# Patient Record
Sex: FEMALE | Race: WHITE | NOT HISPANIC OR LATINO | Employment: UNEMPLOYED | URBAN - NONMETROPOLITAN AREA
[De-identification: names, ages, dates, MRNs, and addresses within clinical notes are randomized per-mention and may not be internally consistent; named-entity substitution may affect disease eponyms.]

---

## 2019-02-23 ENCOUNTER — HOSPITAL ENCOUNTER (EMERGENCY)
Facility: HOSPITAL | Age: 34
Discharge: HOME | End: 2019-02-23
Attending: EMERGENCY MEDICINE | Admitting: EMERGENCY MEDICINE

## 2019-02-23 ENCOUNTER — NURSE TRIAGE (OUTPATIENT)
Dept: CALL CENTER | Facility: HOSPITAL | Age: 34
End: 2019-02-23

## 2019-02-23 VITALS
SYSTOLIC BLOOD PRESSURE: 135 MMHG | HEIGHT: 62 IN | OXYGEN SATURATION: 99 % | RESPIRATION RATE: 17 BRPM | DIASTOLIC BLOOD PRESSURE: 77 MMHG | WEIGHT: 149.4 LBS | HEART RATE: 98 BPM | BODY MASS INDEX: 27.49 KG/M2 | TEMPERATURE: 98 F
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
## 2019-02-23 DIAGNOSIS — T30.0 FIRST DEGREE BURN: Primary | ICD-10-CM

## 2019-02-23 DIAGNOSIS — L56.8 SUNBURN DUE TO TANNING BED: ICD-10-CM

## 2019-02-23 PROCEDURE — 25010000002 KETOROLAC TROMETHAMINE PER 15 MG: Performed by: EMERGENCY MEDICINE

## 2019-02-23 PROCEDURE — 25010000002 METHYLPREDNISOLONE PER 40 MG: Performed by: EMERGENCY MEDICINE

## 2019-02-23 PROCEDURE — 96372 THER/PROPH/DIAG INJ SC/IM: CPT

## 2019-02-23 PROCEDURE — 99283 EMERGENCY DEPT VISIT LOW MDM: CPT

## 2019-02-23 RX ORDER — NAPROXEN 500 MG/1
500 TABLET ORAL 2 TIMES DAILY PRN
Qty: 20 TABLET | Refills: 0 | OUTPATIENT
Start: 2019-02-23 | End: 2019-03-03

## 2019-02-23 RX ORDER — AMLODIPINE BESYLATE 10 MG/1
10 TABLET ORAL DAILY
Status: ON HOLD | COMMUNITY
End: 2020-06-21

## 2019-02-23 RX ORDER — ACETAMINOPHEN AND CODEINE PHOSPHATE 300; 30 MG/1; MG/1
1 TABLET ORAL EVERY 6 HOURS PRN
Qty: 12 TABLET | Refills: 0 | OUTPATIENT
Start: 2019-02-23 | End: 2019-03-03

## 2019-02-23 RX ORDER — KETOROLAC TROMETHAMINE 30 MG/ML
60 INJECTION, SOLUTION INTRAMUSCULAR; INTRAVENOUS ONCE
Status: COMPLETED | OUTPATIENT
Start: 2019-02-23 | End: 2019-02-23

## 2019-02-23 RX ORDER — METHYLPREDNISOLONE SODIUM SUCCINATE 40 MG/ML
80 INJECTION, POWDER, LYOPHILIZED, FOR SOLUTION INTRAMUSCULAR; INTRAVENOUS ONCE
Status: COMPLETED | OUTPATIENT
Start: 2019-02-23 | End: 2019-02-23

## 2019-02-23 RX ORDER — LISINOPRIL 20 MG/1
20 TABLET ORAL DAILY
Status: ON HOLD | COMMUNITY
End: 2020-06-21

## 2019-02-23 RX ADMIN — KETOROLAC TROMETHAMINE 60 MG: 60 INJECTION, SOLUTION INTRAMUSCULAR at 17:18

## 2019-02-23 RX ADMIN — METHYLPREDNISOLONE SODIUM SUCCINATE 80 MG: 40 INJECTION, POWDER, FOR SOLUTION INTRAMUSCULAR; INTRAVENOUS at 17:21

## 2019-02-23 NOTE — ED PROVIDER NOTES
Subjective   33-year-old white female here for burn.  Patient states that she lie in the tanning bed for 30 minutes yesterday after not being in the sun for a couple of years.  She complains of severe burn with redness and pain all over her body.  She denies any other complaints.  Her mother put Solarcaine gel on her without relief.  Patient states she feels like her skin is to continue her clothes.  Mother says she did not notice any areas of blistering.            Review of Systems   All other systems reviewed and are negative.      Past Medical History:   Diagnosis Date   • Kidney anomaly, congenital    • Kidney stone        No Known Allergies    Past Surgical History:   Procedure Laterality Date   •  SECTION         History reviewed. No pertinent family history.    Social History     Socioeconomic History   • Marital status:      Spouse name: Not on file   • Number of children: Not on file   • Years of education: Not on file   • Highest education level: Not on file   Tobacco Use   • Smoking status: Current Every Day Smoker     Packs/day: 0.50   Substance and Sexual Activity   • Alcohol use: No   • Drug use: No   • Sexual activity: Defer           Objective   Physical Exam   Constitutional: She is oriented to person, place, and time. She appears well-developed and well-nourished.   Appears in pain, crying, and screaming with any motion or palpation.   HENT:   Head: Normocephalic and atraumatic.   Musculoskeletal: Normal range of motion.   Neurological: She is alert and oriented to person, place, and time.   Skin:   Diffuse erythema from the neck to feet.  This is more pronounced on the lower back, abdomen and upper legs.  Several areas of normal color skin in skin folds or areas which were covered such as axillae, sacral, gluteal labs, etc.  There is no evidence of vesicles or bullae.  No third degree burns noted.  Do not appreciate any weeping at this time.   Psychiatric: She has a normal mood and  affect.   Nursing note and vitals reviewed.      Procedures           ED Course                  MDM  Number of Diagnoses or Management Options  First degree burn:   Sunburn due to tanning bed:   Risk of Complications, Morbidity, and/or Mortality  Presenting problems: moderate  Diagnostic procedures: minimal  Management options: low          Final diagnoses:   First degree burn   Sunburn due to tanning bed            Ankit Kahn MD  02/23/19 1906

## 2019-02-23 NOTE — TELEPHONE ENCOUNTER
"    Reason for Disposition  • [1] SEVERE sunburn pain (e.g., excruciating) AND [2] not improved after 2  hours of pain medicine    Additional Information  • Negative: [1] Difficulty breathing AND [2] exposure to fire, smoke, or fumes  • Negative: Shock suspected (e.g., cold/pale/clammy skin, too weak to stand, low BP, rapid pulse)  • Negative: Difficult to awaken or acting confused (e.g., disoriented, slurred speech)  • Negative: [1] Burn area larger than 10 palms of hand (> 10% BSA) AND [2] blisters  • Negative: Sounds like a life-threatening emergency to the triager  • Negative: Smoke inhalation is main concern  • Sunburn  • Negative: Difficult to awaken or acting confused (e.g., disoriented, slurred speech)  • Negative: Passed out (i.e., lost consciousness, collapsed and was not responding)  • Negative: Sounds like a life-threatening emergency to the triager  • Negative: [1] Sunburn - like rash BUT [2] minimal or no sun exposure  • Negative: Heat stroke, sunstroke or heat exhaustion suspected  • Negative: Too weak to stand  • Negative: Fever > 103 F (39.4 C)  • Negative: [1] Blisters (2nd degree burn) AND [2] covers > 10% of body surface area  • Negative: Patient sounds very sick or weak to the triager    Answer Assessment - Initial Assessment Questions  1. ONSET: \"When did it happen?\" If happened < 10 minutes ago, ask: \"Did you apply cold water?\" If not, give First Aid Advice immediately.       Caller fell asleep in a tanning bed.  Happened yesterday.   2. LOCATION: \"Where is the burn located?\"       She states her entire body including her face is sunburned.   3. BURN SIZE: \"How large is the burn?\"  The palm is roughly 1% of the total body surface area (BSA).      Entire body.   4. SEVERITY OF THE BURN: \"Are there any blisters?\"       She denies blisters but states entire body is very, very red and she is in pain.   5. MECHANISM: \"Tell me how it happened.\"      She fell asleep in a tanning bed.  Was in the " "tanning bed 30 minutes.   6. PAIN: \"Are you having any pain?\" \"How bad is the pain?\" (Scale 1-10; or mild, moderate, severe)    - MILD (1-3): doesn't interfere with normal activities     - MODERATE (4-7): interferes with normal activities or awakens from sleep     - SEVERE (8-10): excruciating pain, unable to do any normal activities       Severe pain.   7. INHALATION INJURY: \"Were you exposed to any smoke or fumes?\" If yes: \"Do you have any cough or difficulty breathing?\"      NO inhalation.   8. OTHER SYMPTOMS: \"Do you have any other symptoms?\" (e.g., headache, nausea)      No headache or nausea.   9. PREGNANCY: \"Is there any chance you are pregnant?\" \"When was your last menstrual period?\"      No.    Answer Assessment - Initial Assessment Questions  1. APPEARANCE of SKIN: \"What does it look like?\" \"Where is the sunburn located?\"       Skin is very red over entire body.   2. BLISTERS: \"Are there any blisters?\" If so, ask: \"Where are they located?\" \"How big are they?\" \"Are they closed or broken?\"       She states she does not see any blisters but cannot see entire body.   3. EXTENT: \"How much of the body has a sunburn?\" \"How large is the area of blistering?\" (can compare to the size of their palm)      Entire body including the face.   4. ONSET: \"When did the sun exposure occur?\" (Hours or days ago)       Yesterday.   5. PAIN: \"How painful is the sunburn?\"  (Scale 1-10; mild, moderate or severe)      Severe.   6. OTHER SYMPTOMS: \"Do you have any other symptoms?\" (e.g., lightheadedness, nausea)      No other symptoms.   7. PREGNANCY: \"Is there any chance you are pregnant?\" \"When was your last menstrual period?\"      No.    Protocols used: SUNBURN-ADULT-, BURNS - THERMAL-ADULT-      "

## 2019-03-03 ENCOUNTER — HOSPITAL ENCOUNTER (EMERGENCY)
Facility: HOSPITAL | Age: 34
Discharge: HOME | End: 2019-03-03
Attending: FAMILY MEDICINE | Admitting: FAMILY MEDICINE

## 2019-03-03 VITALS
WEIGHT: 148 LBS | BODY MASS INDEX: 26.22 KG/M2 | HEIGHT: 63 IN | TEMPERATURE: 98.2 F | OXYGEN SATURATION: 98 % | DIASTOLIC BLOOD PRESSURE: 89 MMHG | HEART RATE: 74 BPM | SYSTOLIC BLOOD PRESSURE: 137 MMHG | RESPIRATION RATE: 20 BRPM

## 2019-03-03 DIAGNOSIS — F19.10 SUBSTANCE ABUSE (HCC): Primary | ICD-10-CM

## 2019-03-03 LAB
6-ACETYL MORPHINE: NEGATIVE
ALBUMIN SERPL-MCNC: 4.3 G/DL (ref 3.5–5)
ALBUMIN/GLOB SERPL: 1.3 G/DL (ref 1.5–2.5)
ALP SERPL-CCNC: 69 U/L (ref 35–104)
ALT SERPL W P-5'-P-CCNC: 18 U/L (ref 10–36)
AMPHET+METHAMPHET UR QL: POSITIVE
ANION GAP SERPL CALCULATED.3IONS-SCNC: 6 MMOL/L (ref 3.6–11.2)
AST SERPL-CCNC: 24 U/L (ref 10–30)
B-HCG UR QL: NEGATIVE
BACTERIA UR QL AUTO: ABNORMAL /HPF
BARBITURATES UR QL SCN: NEGATIVE
BASOPHILS # BLD AUTO: 0.04 10*3/MM3 (ref 0–0.3)
BASOPHILS NFR BLD AUTO: 0.6 % (ref 0–2)
BENZODIAZ UR QL SCN: NEGATIVE
BILIRUB SERPL-MCNC: 0.3 MG/DL (ref 0.2–1.8)
BILIRUB UR QL STRIP: NEGATIVE
BUN BLD-MCNC: 16 MG/DL (ref 7–21)
BUN/CREAT SERPL: 22.5 (ref 7–25)
BUPRENORPHINE SERPL-MCNC: POSITIVE NG/ML
CALCIUM SPEC-SCNC: 9.2 MG/DL (ref 7.7–10)
CANNABINOIDS SERPL QL: POSITIVE
CHLORIDE SERPL-SCNC: 108 MMOL/L (ref 99–112)
CLARITY UR: CLEAR
CO2 SERPL-SCNC: 24 MMOL/L (ref 24.3–31.9)
COCAINE UR QL: NEGATIVE
COLOR UR: ABNORMAL
CREAT BLD-MCNC: 0.71 MG/DL (ref 0.43–1.29)
DEPRECATED RDW RBC AUTO: 43.1 FL (ref 37–54)
EOSINOPHIL # BLD AUTO: 0.23 10*3/MM3 (ref 0–0.7)
EOSINOPHIL NFR BLD AUTO: 3.2 % (ref 0–5)
ERYTHROCYTE [DISTWIDTH] IN BLOOD BY AUTOMATED COUNT: 13.7 % (ref 11.5–14.5)
ETHANOL BLD-MCNC: <10 MG/DL
ETHANOL UR QL: <0.01 %
GFR SERPL CREATININE-BSD FRML MDRD: 95 ML/MIN/1.73
GLOBULIN UR ELPH-MCNC: 3.4 GM/DL
GLUCOSE BLD-MCNC: 77 MG/DL (ref 70–110)
GLUCOSE UR STRIP-MCNC: NEGATIVE MG/DL
HCT VFR BLD AUTO: 37.1 % (ref 37–47)
HGB BLD-MCNC: 12.2 G/DL (ref 12–16)
HGB UR QL STRIP.AUTO: ABNORMAL
HYALINE CASTS UR QL AUTO: ABNORMAL /LPF
IMM GRANULOCYTES # BLD AUTO: 0.02 10*3/MM3 (ref 0–0.03)
IMM GRANULOCYTES NFR BLD AUTO: 0.3 % (ref 0–0.5)
KETONES UR QL STRIP: NEGATIVE
LEUKOCYTE ESTERASE UR QL STRIP.AUTO: NEGATIVE
LYMPHOCYTES # BLD AUTO: 2.22 10*3/MM3 (ref 1–3)
LYMPHOCYTES NFR BLD AUTO: 31.3 % (ref 21–51)
MAGNESIUM SERPL-MCNC: 2.2 MG/DL (ref 1.7–2.6)
MCH RBC QN AUTO: 28.7 PG (ref 27–33)
MCHC RBC AUTO-ENTMCNC: 32.9 G/DL (ref 33–37)
MCV RBC AUTO: 87.3 FL (ref 80–94)
METHADONE UR QL SCN: NEGATIVE
MONOCYTES # BLD AUTO: 0.65 10*3/MM3 (ref 0.1–0.9)
MONOCYTES NFR BLD AUTO: 9.2 % (ref 0–10)
NEUTROPHILS # BLD AUTO: 3.94 10*3/MM3 (ref 1.4–6.5)
NEUTROPHILS NFR BLD AUTO: 55.4 % (ref 30–70)
NITRITE UR QL STRIP: POSITIVE
OPIATES UR QL: NEGATIVE
OSMOLALITY SERPL CALC.SUM OF ELEC: 275.7 MOSM/KG (ref 273–305)
OXYCODONE UR QL SCN: NEGATIVE
PCP UR QL SCN: NEGATIVE
PH UR STRIP.AUTO: 5.5 [PH] (ref 5–8)
PLATELET # BLD AUTO: 333 10*3/MM3 (ref 130–400)
PMV BLD AUTO: 10.5 FL (ref 6–10)
POTASSIUM BLD-SCNC: 3.6 MMOL/L (ref 3.5–5.3)
PROT SERPL-MCNC: 7.7 G/DL (ref 6–8)
PROT UR QL STRIP: NEGATIVE
RBC # BLD AUTO: 4.25 10*6/MM3 (ref 4.2–5.4)
RBC # UR: ABNORMAL /HPF
REF LAB TEST METHOD: ABNORMAL
SODIUM BLD-SCNC: 138 MMOL/L (ref 135–153)
SP GR UR STRIP: >=1.03 (ref 1–1.03)
SQUAMOUS #/AREA URNS HPF: ABNORMAL /HPF
UROBILINOGEN UR QL STRIP: ABNORMAL
WBC NRBC COR # BLD: 7.1 10*3/MM3 (ref 4.5–12.5)
WBC UR QL AUTO: ABNORMAL /HPF

## 2019-03-03 PROCEDURE — 80307 DRUG TEST PRSMV CHEM ANLYZR: CPT | Performed by: PHYSICIAN ASSISTANT

## 2019-03-03 PROCEDURE — 83735 ASSAY OF MAGNESIUM: CPT | Performed by: PHYSICIAN ASSISTANT

## 2019-03-03 PROCEDURE — 81025 URINE PREGNANCY TEST: CPT | Performed by: PHYSICIAN ASSISTANT

## 2019-03-03 PROCEDURE — 99284 EMERGENCY DEPT VISIT MOD MDM: CPT

## 2019-03-03 PROCEDURE — 85025 COMPLETE CBC W/AUTO DIFF WBC: CPT | Performed by: PHYSICIAN ASSISTANT

## 2019-03-03 PROCEDURE — 81001 URINALYSIS AUTO W/SCOPE: CPT | Performed by: PHYSICIAN ASSISTANT

## 2019-03-03 PROCEDURE — 80053 COMPREHEN METABOLIC PANEL: CPT | Performed by: PHYSICIAN ASSISTANT

## 2019-03-03 NOTE — NURSING NOTE
Presented clinicals to DR Lerma. Pt does not meet admission criteria at this, recommended that pt continue with plans of entering rehab and return if symptoms worsen. Pt verbalized understanding of dc instructions. Detox packet given

## 2019-03-03 NOTE — ED PROVIDER NOTES
Subjective   33-year-old female presents the ED today for a detox evaluation.  She states she plans to go to the [facility] for drug rehab after detox.  She states she also wants to be evaluated to get back on her psychiatric medicines.  She states she is most to be on Paxil and some other medications but had to stop them when she lost her medical card.  She states she has been using Suboxone, opiates, methamphetamine, Neurontin and Lyrica.  She last used yesterday.  She denies any suicidal ideations.  She denies any alcohol use.        History provided by:  Patient  Drug / Alcohol Assessment   Primary symptoms comment: requesting detox. This is a new problem. The current episode started 12 to 24 hours ago. The problem has been gradually worsening. Suspected agents include opiates, prescription drugs and methamphetamines. Pertinent negatives include no fever, no injury, no nausea, no vomiting, no bladder incontinence and no bowel incontinence. Associated medical issues include addiction treatment and mental illness.       Review of Systems   Constitutional: Negative for fever.   HENT: Negative.    Eyes: Negative.    Respiratory: Negative.    Cardiovascular: Negative.    Gastrointestinal: Negative for bowel incontinence, nausea and vomiting.   Genitourinary: Negative.  Negative for bladder incontinence.   Musculoskeletal: Negative.    Skin: Negative.    Neurological: Negative.    Psychiatric/Behavioral: Negative for suicidal ideas.   All other systems reviewed and are negative.      Past Medical History:   Diagnosis Date   • Kidney anomaly, congenital    • Kidney stone        No Known Allergies    Past Surgical History:   Procedure Laterality Date   •  SECTION         History reviewed. No pertinent family history.    Social History     Socioeconomic History   • Marital status:      Spouse name: Not on file   • Number of children: Not on file   • Years of education: Not on file   • Highest education  level: Not on file   Tobacco Use   • Smoking status: Current Every Day Smoker     Packs/day: 1.00   Substance and Sexual Activity   • Alcohol use: No   • Drug use: Yes     Types: Methamphetamines, Oxycodone     Comment: suboxone, neurontin   • Sexual activity: Defer           Objective   Physical Exam   Constitutional: She is oriented to person, place, and time. She appears well-developed and well-nourished. No distress.   HENT:   Head: Normocephalic and atraumatic.   Right Ear: External ear normal.   Left Ear: External ear normal.   Nose: Nose normal.   Mouth/Throat: Oropharynx is clear and moist.   Eyes: Conjunctivae and EOM are normal. Pupils are equal, round, and reactive to light.   Neck: Normal range of motion. Neck supple.   Cardiovascular: Normal rate, regular rhythm, normal heart sounds and intact distal pulses.   Pulmonary/Chest: Effort normal and breath sounds normal.   Abdominal: Soft. Bowel sounds are normal.   Musculoskeletal: Normal range of motion.   Neurological: She is alert and oriented to person, place, and time.   Skin: Skin is warm and dry. Capillary refill takes less than 2 seconds.   Psychiatric: She has a normal mood and affect. Her speech is normal. Judgment normal. She is hyperactive. Cognition and memory are normal. She expresses no homicidal and no suicidal ideation.   Nursing note and vitals reviewed.      Procedures           ED Course  ED Course as of Mar 03 1817   Sun Mar 03, 2019   1702 Medically clear for psych  [AH]   1715 Psychiatry advised patient can be discharged to follow up outpatient.  []      ED Course User Index  [] Mary Mejía PA                  MDM  Number of Diagnoses or Management Options  Substance abuse (CMS/HCC):      Amount and/or Complexity of Data Reviewed  Clinical lab tests: reviewed    Patient Progress  Patient progress: stable        Final diagnoses:   Substance abuse (CMS/HCC)            Mary Mejía PA  03/03/19 1817

## 2019-03-03 NOTE — NURSING NOTE
Intake assessment completed. Pt presents seeking detox admission. Pt reports that she relapsed after a 1 year sobriety 2 weeks ago following the death of a family member. The pt reports occasional use of several substances during the past two weeks. Reports that she has used meth and pain pills a few times, and that she has used suboxone daily over the last 5 days. She denies si, hi, and AVH. Reports last use two days ago.

## 2020-02-01 ENCOUNTER — HOSPITAL ENCOUNTER (EMERGENCY)
Facility: HOSPITAL | Age: 35
Discharge: HOME | End: 2020-02-01
Attending: EMERGENCY MEDICINE | Admitting: EMERGENCY MEDICINE

## 2020-02-01 VITALS
BODY MASS INDEX: 26.05 KG/M2 | SYSTOLIC BLOOD PRESSURE: 131 MMHG | WEIGHT: 147 LBS | TEMPERATURE: 98.1 F | DIASTOLIC BLOOD PRESSURE: 94 MMHG | OXYGEN SATURATION: 99 % | RESPIRATION RATE: 20 BRPM | HEART RATE: 90 BPM | HEIGHT: 63 IN

## 2020-02-01 DIAGNOSIS — N39.0 ACUTE UTI: Primary | ICD-10-CM

## 2020-02-01 LAB
6-ACETYL MORPHINE: NEGATIVE
ALBUMIN SERPL-MCNC: 3.5 G/DL (ref 3.5–5.2)
ALBUMIN/GLOB SERPL: 1.1 G/DL
ALP SERPL-CCNC: 59 U/L (ref 39–117)
ALT SERPL W P-5'-P-CCNC: 12 U/L (ref 1–33)
AMPHET+METHAMPHET UR QL: NEGATIVE
ANION GAP SERPL CALCULATED.3IONS-SCNC: 13.7 MMOL/L (ref 5–15)
AST SERPL-CCNC: 14 U/L (ref 1–32)
BACTERIA UR QL AUTO: ABNORMAL /HPF
BARBITURATES UR QL SCN: NEGATIVE
BASOPHILS # BLD AUTO: 0.04 10*3/MM3 (ref 0–0.2)
BASOPHILS NFR BLD AUTO: 0.2 % (ref 0–1.5)
BENZODIAZ UR QL SCN: NEGATIVE
BILIRUB SERPL-MCNC: <0.2 MG/DL (ref 0.2–1.2)
BILIRUB UR QL STRIP: NEGATIVE
BUN BLD-MCNC: 14 MG/DL (ref 6–20)
BUN/CREAT SERPL: 18.7 (ref 7–25)
BUPRENORPHINE SERPL-MCNC: NEGATIVE NG/ML
CALCIUM SPEC-SCNC: 9 MG/DL (ref 8.6–10.5)
CANNABINOIDS SERPL QL: NEGATIVE
CHLORIDE SERPL-SCNC: 103 MMOL/L (ref 98–107)
CLARITY UR: ABNORMAL
CO2 SERPL-SCNC: 22.3 MMOL/L (ref 22–29)
COCAINE UR QL: NEGATIVE
COLOR UR: YELLOW
CREAT BLD-MCNC: 0.75 MG/DL (ref 0.57–1)
DEPRECATED RDW RBC AUTO: 45.2 FL (ref 37–54)
EOSINOPHIL # BLD AUTO: 0.26 10*3/MM3 (ref 0–0.4)
EOSINOPHIL NFR BLD AUTO: 1.3 % (ref 0.3–6.2)
ERYTHROCYTE [DISTWIDTH] IN BLOOD BY AUTOMATED COUNT: 13.9 % (ref 12.3–15.4)
GFR SERPL CREATININE-BSD FRML MDRD: 89 ML/MIN/1.73
GLOBULIN UR ELPH-MCNC: 3.3 GM/DL
GLUCOSE BLD-MCNC: 68 MG/DL (ref 65–99)
GLUCOSE UR STRIP-MCNC: NEGATIVE MG/DL
HCT VFR BLD AUTO: 38.8 % (ref 34–46.6)
HGB BLD-MCNC: 12.5 G/DL (ref 12–15.9)
HGB UR QL STRIP.AUTO: NEGATIVE
HYALINE CASTS UR QL AUTO: ABNORMAL /LPF
IMM GRANULOCYTES # BLD AUTO: 0.09 10*3/MM3 (ref 0–0.05)
IMM GRANULOCYTES NFR BLD AUTO: 0.5 % (ref 0–0.5)
KETONES UR QL STRIP: NEGATIVE
LEUKOCYTE ESTERASE UR QL STRIP.AUTO: ABNORMAL
LYMPHOCYTES # BLD AUTO: 3.97 10*3/MM3 (ref 0.7–3.1)
LYMPHOCYTES NFR BLD AUTO: 20.6 % (ref 19.6–45.3)
MCH RBC QN AUTO: 28.6 PG (ref 26.6–33)
MCHC RBC AUTO-ENTMCNC: 32.2 G/DL (ref 31.5–35.7)
MCV RBC AUTO: 88.8 FL (ref 79–97)
METHADONE UR QL SCN: NEGATIVE
MONOCYTES # BLD AUTO: 0.95 10*3/MM3 (ref 0.1–0.9)
MONOCYTES NFR BLD AUTO: 4.9 % (ref 5–12)
NEUTROPHILS # BLD AUTO: 14 10*3/MM3 (ref 1.7–7)
NEUTROPHILS NFR BLD AUTO: 72.5 % (ref 42.7–76)
NITRITE UR QL STRIP: NEGATIVE
NRBC BLD AUTO-RTO: 0 /100 WBC (ref 0–0.2)
OPIATES UR QL: NEGATIVE
OXYCODONE UR QL SCN: NEGATIVE
PCP UR QL SCN: NEGATIVE
PH UR STRIP.AUTO: 8 [PH] (ref 5–8)
PLATELET # BLD AUTO: 332 10*3/MM3 (ref 140–450)
PMV BLD AUTO: 10.7 FL (ref 6–12)
POTASSIUM BLD-SCNC: 3.7 MMOL/L (ref 3.5–5.2)
PROT SERPL-MCNC: 6.8 G/DL (ref 6–8.5)
PROT UR QL STRIP: NEGATIVE
RBC # BLD AUTO: 4.37 10*6/MM3 (ref 3.77–5.28)
RBC # UR: ABNORMAL /HPF
REF LAB TEST METHOD: ABNORMAL
SODIUM BLD-SCNC: 139 MMOL/L (ref 136–145)
SP GR UR STRIP: 1.02 (ref 1–1.03)
SQUAMOUS #/AREA URNS HPF: ABNORMAL /HPF
UROBILINOGEN UR QL STRIP: ABNORMAL
WBC NRBC COR # BLD: 19.31 10*3/MM3 (ref 3.4–10.8)
WBC UR QL AUTO: ABNORMAL /HPF

## 2020-02-01 PROCEDURE — 80307 DRUG TEST PRSMV CHEM ANLYZR: CPT | Performed by: EMERGENCY MEDICINE

## 2020-02-01 PROCEDURE — 85025 COMPLETE CBC W/AUTO DIFF WBC: CPT | Performed by: EMERGENCY MEDICINE

## 2020-02-01 PROCEDURE — 81001 URINALYSIS AUTO W/SCOPE: CPT | Performed by: EMERGENCY MEDICINE

## 2020-02-01 PROCEDURE — 93005 ELECTROCARDIOGRAM TRACING: CPT | Performed by: EMERGENCY MEDICINE

## 2020-02-01 PROCEDURE — 99284 EMERGENCY DEPT VISIT MOD MDM: CPT

## 2020-02-01 PROCEDURE — 93010 ELECTROCARDIOGRAM REPORT: CPT | Performed by: SPECIALIST

## 2020-02-01 PROCEDURE — 80053 COMPREHEN METABOLIC PANEL: CPT | Performed by: EMERGENCY MEDICINE

## 2020-02-01 PROCEDURE — 87086 URINE CULTURE/COLONY COUNT: CPT | Performed by: EMERGENCY MEDICINE

## 2020-02-01 RX ORDER — NITROFURANTOIN 25; 75 MG/1; MG/1
100 CAPSULE ORAL ONCE
Status: COMPLETED | OUTPATIENT
Start: 2020-02-01 | End: 2020-02-01

## 2020-02-01 RX ORDER — LABETALOL 100 MG/1
100 TABLET, FILM COATED ORAL ONCE
Status: COMPLETED | OUTPATIENT
Start: 2020-02-01 | End: 2020-02-01

## 2020-02-01 RX ORDER — NITROFURANTOIN 25; 75 MG/1; MG/1
100 CAPSULE ORAL 2 TIMES DAILY
Qty: 14 CAPSULE | Refills: 0 | Status: SHIPPED | OUTPATIENT
Start: 2020-02-01 | End: 2020-02-08

## 2020-02-01 RX ORDER — HYDRALAZINE HYDROCHLORIDE 10 MG/1
20 TABLET, FILM COATED ORAL ONCE
Status: DISCONTINUED | OUTPATIENT
Start: 2020-02-01 | End: 2020-02-01

## 2020-02-01 RX ADMIN — NITROFURANTOIN MONOHYDRATE/MACROCRYSTALLINE 100 MG: 25; 75 CAPSULE ORAL at 18:15

## 2020-02-01 RX ADMIN — LABETALOL HYDROCHLORIDE 100 MG: 100 TABLET, FILM COATED ORAL at 17:31

## 2020-02-01 NOTE — ED NOTES
Pt discharged home with family, ambulatory, AAOx4 with RAY.     Yoselin Pinon, RN  02/01/20 9764

## 2020-02-02 LAB — BACTERIA SPEC AEROBE CULT: NO GROWTH

## 2020-02-02 NOTE — ED PROVIDER NOTES
Subjective   Patient presents to [facility] with pregnancy problem and shortness of breath.      Abdominal Pain   Pain location:  Suprapubic  Pain quality: aching    Pain radiates to:  Does not radiate  Pain severity:  Mild  Onset quality:  Gradual  Progression:  Worsening  Chronicity:  New  Associated symptoms: dysuria and fatigue        Review of Systems   Constitutional: Positive for activity change and fatigue.   HENT: Negative.    Eyes: Negative.    Respiratory: Negative.    Cardiovascular: Negative.    Gastrointestinal: Positive for abdominal pain.   Endocrine: Negative.    Genitourinary: Positive for dysuria and frequency.   Musculoskeletal: Negative.    Skin: Negative.    Allergic/Immunologic: Negative.    Neurological: Negative.    Hematological: Negative.        Past Medical History:   Diagnosis Date   • Kidney anomaly, congenital    • Kidney stone        No Known Allergies    Past Surgical History:   Procedure Laterality Date   •  SECTION         No family history on file.    Social History     Socioeconomic History   • Marital status:      Spouse name: Not on file   • Number of children: Not on file   • Years of education: Not on file   • Highest education level: Not on file   Tobacco Use   • Smoking status: Current Every Day Smoker     Packs/day: 1.00   Substance and Sexual Activity   • Alcohol use: No   • Drug use: Yes     Types: Methamphetamines, Oxycodone     Comment: suboxone, neurontin   • Sexual activity: Defer           Objective   Physical Exam   Constitutional: She appears well-developed and well-nourished.   HENT:   Head: Normocephalic.   Mouth/Throat: Oropharynx is clear and moist.   Eyes: Pupils are equal, round, and reactive to light. EOM are normal.   Neck: Normal range of motion.   Cardiovascular: Normal rate.   Pulmonary/Chest: Effort normal and breath sounds normal.   Abdominal: Soft.   Gravid uterus, good fetal heart tones   Musculoskeletal: Normal range of motion.        Right  lower leg: Normal.        Left lower leg: Normal.   Neurological: She is alert.   Skin: Skin is warm.   Psychiatric: She has a normal mood and affect.   Nursing note and vitals reviewed.      Procedures           ED Course                                               MDM    Final diagnoses:   Acute UTI            Guido William MD  02/02/20 1313       Guido William MD  02/02/20 1316       Guido William MD  02/02/20 1316

## 2020-03-04 LAB
EXTERNAL ABO GROUPING: NORMAL
EXTERNAL ANTIBODY SCREEN: NEGATIVE
EXTERNAL CHLAMYDIA SCREEN: NEGATIVE
EXTERNAL GONORRHEA SCREEN: NEGATIVE
EXTERNAL HEPATITIS B SURFACE ANTIGEN: NEGATIVE
EXTERNAL RH FACTOR: POSITIVE
EXTERNAL RUBELLA QUALITATIVE: NORMAL
EXTERNAL SYPHILIS RPR SCREEN: NORMAL
HIV1 P24 AG SERPL QL IA: NORMAL

## 2020-06-21 ENCOUNTER — HOSPITAL ENCOUNTER (INPATIENT)
Facility: HOSPITAL | Age: 35
LOS: 5 days | Discharge: HOME | End: 2020-06-26
Attending: OBSTETRICS & GYNECOLOGY | Admitting: OBSTETRICS & GYNECOLOGY

## 2020-06-21 ENCOUNTER — APPOINTMENT (OUTPATIENT)
Dept: ULTRASOUND IMAGING | Facility: HOSPITAL | Age: 35
End: 2020-06-21

## 2020-06-21 DIAGNOSIS — O47.9 UTERINE CONTRACTIONS DURING PREGNANCY: Primary | ICD-10-CM

## 2020-06-21 DIAGNOSIS — O36.5931 INTRAUTERINE GROWTH RESTRICTION (IUGR) AFFECTING CARE OF MOTHER, THIRD TRIMESTER, FETUS 1: ICD-10-CM

## 2020-06-21 PROBLEM — O36.5990 IUGR (INTRAUTERINE GROWTH RESTRICTION) AFFECTING CARE OF MOTHER: Status: ACTIVE | Noted: 2020-06-21

## 2020-06-21 LAB
6-ACETYL MORPHINE: NEGATIVE
ABO GROUP BLD: NORMAL
ALBUMIN SERPL-MCNC: 2.95 G/DL (ref 3.5–5.2)
ALBUMIN/GLOB SERPL: 0.8 G/DL
ALP SERPL-CCNC: 278 U/L (ref 39–117)
ALT SERPL W P-5'-P-CCNC: 17 U/L (ref 1–33)
AMPHET+METHAMPHET UR QL: POSITIVE
ANION GAP SERPL CALCULATED.3IONS-SCNC: 13.1 MMOL/L (ref 5–15)
AST SERPL-CCNC: 25 U/L (ref 1–32)
BACTERIA UR QL AUTO: ABNORMAL /HPF
BARBITURATES UR QL SCN: NEGATIVE
BASOPHILS # BLD AUTO: 0.02 10*3/MM3 (ref 0–0.2)
BASOPHILS NFR BLD AUTO: 0.2 % (ref 0–1.5)
BENZODIAZ UR QL SCN: NEGATIVE
BILIRUB SERPL-MCNC: 0.2 MG/DL (ref 0.2–1.2)
BILIRUB UR QL STRIP: NEGATIVE
BLD GP AB SCN SERPL QL: NEGATIVE
BUN BLD-MCNC: 7 MG/DL (ref 6–20)
BUN/CREAT SERPL: 9.3 (ref 7–25)
BUPRENORPHINE SERPL-MCNC: NEGATIVE NG/ML
CALCIUM SPEC-SCNC: 9.1 MG/DL (ref 8.6–10.5)
CANNABINOIDS SERPL QL: NEGATIVE
CHLORIDE SERPL-SCNC: 106 MMOL/L (ref 98–107)
CLARITY UR: ABNORMAL
CO2 SERPL-SCNC: 18.9 MMOL/L (ref 22–29)
COCAINE UR QL: NEGATIVE
COLOR UR: ABNORMAL
CREAT BLD-MCNC: 0.75 MG/DL (ref 0.57–1)
DEPRECATED RDW RBC AUTO: 39.8 FL (ref 37–54)
EOSINOPHIL # BLD AUTO: 0.06 10*3/MM3 (ref 0–0.4)
EOSINOPHIL NFR BLD AUTO: 0.5 % (ref 0.3–6.2)
ERYTHROCYTE [DISTWIDTH] IN BLOOD BY AUTOMATED COUNT: 12.9 % (ref 12.3–15.4)
GFR SERPL CREATININE-BSD FRML MDRD: 89 ML/MIN/1.73
GLOBULIN UR ELPH-MCNC: 3.6 GM/DL
GLUCOSE BLD-MCNC: 127 MG/DL (ref 65–99)
GLUCOSE UR STRIP-MCNC: NEGATIVE MG/DL
HCT VFR BLD AUTO: 34.7 % (ref 34–46.6)
HGB BLD-MCNC: 11.4 G/DL (ref 12–15.9)
HGB UR QL STRIP.AUTO: NEGATIVE
HYALINE CASTS UR QL AUTO: ABNORMAL /LPF
IMM GRANULOCYTES # BLD AUTO: 0.07 10*3/MM3 (ref 0–0.05)
IMM GRANULOCYTES NFR BLD AUTO: 0.6 % (ref 0–0.5)
KETONES UR QL STRIP: ABNORMAL
LEUKOCYTE ESTERASE UR QL STRIP.AUTO: ABNORMAL
LYMPHOCYTES # BLD AUTO: 2.99 10*3/MM3 (ref 0.7–3.1)
LYMPHOCYTES NFR BLD AUTO: 23.5 % (ref 19.6–45.3)
MCH RBC QN AUTO: 28.4 PG (ref 26.6–33)
MCHC RBC AUTO-ENTMCNC: 32.9 G/DL (ref 31.5–35.7)
MCV RBC AUTO: 86.3 FL (ref 79–97)
METHADONE UR QL SCN: NEGATIVE
MONOCYTES # BLD AUTO: 0.64 10*3/MM3 (ref 0.1–0.9)
MONOCYTES NFR BLD AUTO: 5 % (ref 5–12)
NEUTROPHILS # BLD AUTO: 8.92 10*3/MM3 (ref 1.7–7)
NEUTROPHILS NFR BLD AUTO: 70.2 % (ref 42.7–76)
NITRITE UR QL STRIP: NEGATIVE
NRBC BLD AUTO-RTO: 0 /100 WBC (ref 0–0.2)
OPIATES UR QL: NEGATIVE
OXYCODONE UR QL SCN: NEGATIVE
PCP UR QL SCN: NEGATIVE
PH UR STRIP.AUTO: 6.5 [PH] (ref 5–8)
PLATELET # BLD AUTO: 281 10*3/MM3 (ref 140–450)
PMV BLD AUTO: 12.6 FL (ref 6–12)
POTASSIUM BLD-SCNC: 3.3 MMOL/L (ref 3.5–5.2)
PROT SERPL-MCNC: 6.5 G/DL (ref 6–8.5)
PROT UR QL STRIP: ABNORMAL
RBC # BLD AUTO: 4.02 10*6/MM3 (ref 3.77–5.28)
RBC # UR: ABNORMAL /HPF
REF LAB TEST METHOD: ABNORMAL
RH BLD: POSITIVE
SODIUM BLD-SCNC: 138 MMOL/L (ref 136–145)
SP GR UR STRIP: 1.02 (ref 1–1.03)
SQUAMOUS #/AREA URNS HPF: ABNORMAL /HPF
T&S EXPIRATION DATE: NORMAL
URATE SERPL-MCNC: 6.2 MG/DL (ref 2.4–5.7)
UROBILINOGEN UR QL STRIP: ABNORMAL
WBC NRBC COR # BLD: 12.7 10*3/MM3 (ref 3.4–10.8)
WBC UR QL AUTO: ABNORMAL /HPF

## 2020-06-21 PROCEDURE — 84550 ASSAY OF BLOOD/URIC ACID: CPT | Performed by: OBSTETRICS & GYNECOLOGY

## 2020-06-21 PROCEDURE — G0463 HOSPITAL OUTPT CLINIC VISIT: HCPCS

## 2020-06-21 PROCEDURE — 80307 DRUG TEST PRSMV CHEM ANLYZR: CPT | Performed by: OBSTETRICS & GYNECOLOGY

## 2020-06-21 PROCEDURE — 86900 BLOOD TYPING SEROLOGIC ABO: CPT | Performed by: OBSTETRICS & GYNECOLOGY

## 2020-06-21 PROCEDURE — 86900 BLOOD TYPING SEROLOGIC ABO: CPT

## 2020-06-21 PROCEDURE — 25010000002 CEFTRIAXONE PER 250 MG: Performed by: OBSTETRICS & GYNECOLOGY

## 2020-06-21 PROCEDURE — 25010000002 TERBUTALINE PER 1 MG

## 2020-06-21 PROCEDURE — 36415 COLL VENOUS BLD VENIPUNCTURE: CPT | Performed by: OBSTETRICS & GYNECOLOGY

## 2020-06-21 PROCEDURE — 86850 RBC ANTIBODY SCREEN: CPT | Performed by: OBSTETRICS & GYNECOLOGY

## 2020-06-21 PROCEDURE — 25010000002 BETAMETHASONE ACET & SOD PHOS PER 4 MG: Performed by: OBSTETRICS & GYNECOLOGY

## 2020-06-21 PROCEDURE — 87086 URINE CULTURE/COLONY COUNT: CPT | Performed by: OBSTETRICS & GYNECOLOGY

## 2020-06-21 PROCEDURE — 86901 BLOOD TYPING SEROLOGIC RH(D): CPT

## 2020-06-21 PROCEDURE — 86901 BLOOD TYPING SEROLOGIC RH(D): CPT | Performed by: OBSTETRICS & GYNECOLOGY

## 2020-06-21 PROCEDURE — 76819 FETAL BIOPHYS PROFIL W/O NST: CPT

## 2020-06-21 PROCEDURE — 59025 FETAL NON-STRESS TEST: CPT

## 2020-06-21 PROCEDURE — 80053 COMPREHEN METABOLIC PANEL: CPT | Performed by: OBSTETRICS & GYNECOLOGY

## 2020-06-21 PROCEDURE — 85025 COMPLETE CBC W/AUTO DIFF WBC: CPT | Performed by: OBSTETRICS & GYNECOLOGY

## 2020-06-21 PROCEDURE — 81001 URINALYSIS AUTO W/SCOPE: CPT | Performed by: OBSTETRICS & GYNECOLOGY

## 2020-06-21 PROCEDURE — 25010000002 TERBUTALINE PER 1 MG: Performed by: OBSTETRICS & GYNECOLOGY

## 2020-06-21 RX ORDER — NIFEDIPINE 10 MG/1
20 CAPSULE ORAL
Status: DISCONTINUED | OUTPATIENT
Start: 2020-06-21 | End: 2020-06-22

## 2020-06-21 RX ORDER — HYDROXYZINE 50 MG/1
50 TABLET, FILM COATED ORAL NIGHTLY PRN
Status: DISCONTINUED | OUTPATIENT
Start: 2020-06-21 | End: 2020-06-22

## 2020-06-21 RX ORDER — TERBUTALINE SULFATE 1 MG/ML
0.25 INJECTION, SOLUTION SUBCUTANEOUS ONCE
Status: COMPLETED | OUTPATIENT
Start: 2020-06-21 | End: 2020-06-21

## 2020-06-21 RX ORDER — SODIUM CHLORIDE, SODIUM LACTATE, POTASSIUM CHLORIDE, CALCIUM CHLORIDE 600; 310; 30; 20 MG/100ML; MG/100ML; MG/100ML; MG/100ML
125 INJECTION, SOLUTION INTRAVENOUS CONTINUOUS
Status: DISCONTINUED | OUTPATIENT
Start: 2020-06-21 | End: 2020-06-22

## 2020-06-21 RX ORDER — SODIUM CHLORIDE 0.9 % (FLUSH) 0.9 %
10 SYRINGE (ML) INJECTION AS NEEDED
Status: DISCONTINUED | OUTPATIENT
Start: 2020-06-21 | End: 2020-06-22

## 2020-06-21 RX ORDER — PRENATAL VIT NO.126/IRON/FOLIC 28MG-0.8MG
1 TABLET ORAL DAILY
COMMUNITY
End: 2020-09-08

## 2020-06-21 RX ORDER — BETAMETHASONE SODIUM PHOSPHATE AND BETAMETHASONE ACETATE 3; 3 MG/ML; MG/ML
12 INJECTION, SUSPENSION INTRA-ARTICULAR; INTRALESIONAL; INTRAMUSCULAR; SOFT TISSUE EVERY 12 HOURS
Status: DISCONTINUED | OUTPATIENT
Start: 2020-06-22 | End: 2020-06-21

## 2020-06-21 RX ORDER — BETAMETHASONE SODIUM PHOSPHATE AND BETAMETHASONE ACETATE 3; 3 MG/ML; MG/ML
12 INJECTION, SUSPENSION INTRA-ARTICULAR; INTRALESIONAL; INTRAMUSCULAR; SOFT TISSUE EVERY 24 HOURS
Status: DISCONTINUED | OUTPATIENT
Start: 2020-06-21 | End: 2020-06-21

## 2020-06-21 RX ORDER — BETAMETHASONE SODIUM PHOSPHATE AND BETAMETHASONE ACETATE 3; 3 MG/ML; MG/ML
12 INJECTION, SUSPENSION INTRA-ARTICULAR; INTRALESIONAL; INTRAMUSCULAR; SOFT TISSUE EVERY 12 HOURS
Status: COMPLETED | OUTPATIENT
Start: 2020-06-22 | End: 2020-06-22

## 2020-06-21 RX ORDER — PRENATAL VIT/IRON FUM/FOLIC AC 27MG-0.8MG
1 TABLET ORAL DAILY
Status: DISCONTINUED | OUTPATIENT
Start: 2020-06-22 | End: 2020-06-22

## 2020-06-21 RX ORDER — SODIUM CHLORIDE 0.9 % (FLUSH) 0.9 %
10 SYRINGE (ML) INJECTION EVERY 12 HOURS SCHEDULED
Status: DISCONTINUED | OUTPATIENT
Start: 2020-06-21 | End: 2020-06-22

## 2020-06-21 RX ORDER — TERBUTALINE SULFATE 1 MG/ML
INJECTION, SOLUTION SUBCUTANEOUS
Status: COMPLETED
Start: 2020-06-21 | End: 2020-06-21

## 2020-06-21 RX ADMIN — TERBUTALINE SULFATE 0.25 MG: 1 INJECTION SUBCUTANEOUS at 15:05

## 2020-06-21 RX ADMIN — NIFEDIPINE 20 MG: 10 CAPSULE ORAL at 14:52

## 2020-06-21 RX ADMIN — CEFTRIAXONE 1 G: 1 INJECTION, POWDER, FOR SOLUTION INTRAMUSCULAR; INTRAVENOUS at 17:12

## 2020-06-21 RX ADMIN — NIFEDIPINE 20 MG: 10 CAPSULE ORAL at 19:17

## 2020-06-21 RX ADMIN — LABETALOL HYDROCHLORIDE 300 MG: 100 TABLET, FILM COATED ORAL at 13:48

## 2020-06-21 RX ADMIN — TERBUTALINE SULFATE 0.25 MG: 1 INJECTION, SOLUTION SUBCUTANEOUS at 13:29

## 2020-06-21 RX ADMIN — SODIUM CHLORIDE, POTASSIUM CHLORIDE, SODIUM LACTATE AND CALCIUM CHLORIDE 125 ML/HR: 600; 310; 30; 20 INJECTION, SOLUTION INTRAVENOUS at 13:37

## 2020-06-21 RX ADMIN — HYDROXYZINE HYDROCHLORIDE 50 MG: 50 TABLET ORAL at 22:16

## 2020-06-21 RX ADMIN — TERBUTALINE SULFATE 0.25 MG: 1 INJECTION SUBCUTANEOUS at 13:29

## 2020-06-21 RX ADMIN — BETAMETHASONE SODIUM PHOSPHATE AND BETAMETHASONE ACETATE 12 MG: 3; 3 INJECTION, SUSPENSION INTRA-ARTICULAR; INTRALESIONAL; INTRAMUSCULAR; SOFT TISSUE at 13:48

## 2020-06-21 RX ADMIN — NIFEDIPINE 20 MG: 10 CAPSULE ORAL at 23:44

## 2020-06-22 ENCOUNTER — ANESTHESIA EVENT (OUTPATIENT)
Dept: LABOR AND DELIVERY | Facility: HOSPITAL | Age: 35
End: 2020-06-22

## 2020-06-22 ENCOUNTER — ANESTHESIA (OUTPATIENT)
Dept: LABOR AND DELIVERY | Facility: HOSPITAL | Age: 35
End: 2020-06-22

## 2020-06-22 LAB
BACTERIA SPEC AEROBE CULT: NO GROWTH
SARS-COV-2 RDRP RESP QL NAA+PROBE: NOT DETECTED

## 2020-06-22 PROCEDURE — 87635 SARS-COV-2 COVID-19 AMP PRB: CPT | Performed by: OBSTETRICS & GYNECOLOGY

## 2020-06-22 PROCEDURE — 25010000002 ONDANSETRON PER 1 MG: Performed by: NURSE ANESTHETIST, CERTIFIED REGISTERED

## 2020-06-22 PROCEDURE — 25010000002 FENTANYL CITRATE (PF) 100 MCG/2ML SOLUTION: Performed by: NURSE ANESTHETIST, CERTIFIED REGISTERED

## 2020-06-22 PROCEDURE — 25010000002 KETOROLAC TROMETHAMINE PER 15 MG: Performed by: OBSTETRICS & GYNECOLOGY

## 2020-06-22 PROCEDURE — 25010000002 MORPHINE PER 10 MG: Performed by: OBSTETRICS & GYNECOLOGY

## 2020-06-22 PROCEDURE — 25010000003 CEFAZOLIN SODIUM-DEXTROSE 2-3 GM-%(50ML) RECONSTITUTED SOLUTION: Performed by: OBSTETRICS & GYNECOLOGY

## 2020-06-22 PROCEDURE — 25010000002 HYDROMORPHONE PER 4 MG: Performed by: OBSTETRICS & GYNECOLOGY

## 2020-06-22 PROCEDURE — 63710000001 DIPHENHYDRAMINE PER 50 MG: Performed by: OBSTETRICS & GYNECOLOGY

## 2020-06-22 PROCEDURE — 25010000002 BETAMETHASONE ACET & SOD PHOS PER 4 MG: Performed by: OBSTETRICS & GYNECOLOGY

## 2020-06-22 PROCEDURE — 25010000003 MORPHINE PER 10 MG: Performed by: NURSE ANESTHETIST, CERTIFIED REGISTERED

## 2020-06-22 RX ORDER — ONDANSETRON 2 MG/ML
4 INJECTION INTRAMUSCULAR; INTRAVENOUS EVERY 6 HOURS PRN
Status: DISCONTINUED | OUTPATIENT
Start: 2020-06-22 | End: 2020-06-22 | Stop reason: HOSPADM

## 2020-06-22 RX ORDER — ONDANSETRON 4 MG/1
4 TABLET, FILM COATED ORAL EVERY 6 HOURS PRN
Status: DISCONTINUED | OUTPATIENT
Start: 2020-06-22 | End: 2020-06-26 | Stop reason: HOSPADM

## 2020-06-22 RX ORDER — DIPHENHYDRAMINE HCL 25 MG
25 CAPSULE ORAL EVERY 4 HOURS PRN
Status: DISCONTINUED | OUTPATIENT
Start: 2020-06-22 | End: 2020-06-26 | Stop reason: HOSPADM

## 2020-06-22 RX ORDER — PROMETHAZINE HYDROCHLORIDE 25 MG/ML
12.5 INJECTION, SOLUTION INTRAMUSCULAR; INTRAVENOUS EVERY 6 HOURS PRN
Status: DISCONTINUED | OUTPATIENT
Start: 2020-06-22 | End: 2020-06-22 | Stop reason: HOSPADM

## 2020-06-22 RX ORDER — IBUPROFEN 800 MG/1
800 TABLET ORAL EVERY 8 HOURS SCHEDULED
Status: DISCONTINUED | OUTPATIENT
Start: 2020-06-22 | End: 2020-06-26 | Stop reason: HOSPADM

## 2020-06-22 RX ORDER — CARBOPROST TROMETHAMINE 250 UG/ML
250 INJECTION, SOLUTION INTRAMUSCULAR AS NEEDED
Status: DISCONTINUED | OUTPATIENT
Start: 2020-06-22 | End: 2020-06-22 | Stop reason: HOSPADM

## 2020-06-22 RX ORDER — METHYLERGONOVINE MALEATE 0.2 MG/ML
200 INJECTION INTRAVENOUS AS NEEDED
Status: DISCONTINUED | OUTPATIENT
Start: 2020-06-22 | End: 2020-06-22 | Stop reason: HOSPADM

## 2020-06-22 RX ORDER — OXYTOCIN-SODIUM CHLORIDE 0.9% IV SOLN 30 UNIT/500ML 30-0.9/5 UT/ML-%
SOLUTION INTRAVENOUS CONTINUOUS PRN
Status: DISCONTINUED | OUTPATIENT
Start: 2020-06-22 | End: 2020-06-22 | Stop reason: SURG

## 2020-06-22 RX ORDER — NALOXONE HCL 0.4 MG/ML
0.1 VIAL (ML) INJECTION
Status: DISCONTINUED | OUTPATIENT
Start: 2020-06-22 | End: 2020-06-26 | Stop reason: HOSPADM

## 2020-06-22 RX ORDER — ONDANSETRON 2 MG/ML
4 INJECTION INTRAMUSCULAR; INTRAVENOUS ONCE AS NEEDED
Status: ACTIVE | OUTPATIENT
Start: 2020-06-22 | End: 2020-06-23

## 2020-06-22 RX ORDER — HYDROXYZINE 50 MG/1
50 TABLET, FILM COATED ORAL EVERY 6 HOURS PRN
Status: DISCONTINUED | OUTPATIENT
Start: 2020-06-22 | End: 2020-06-26 | Stop reason: HOSPADM

## 2020-06-22 RX ORDER — HYDROXYZINE 50 MG/1
25 TABLET, FILM COATED ORAL 3 TIMES DAILY PRN
Status: DISCONTINUED | OUTPATIENT
Start: 2020-06-22 | End: 2020-06-26 | Stop reason: HOSPADM

## 2020-06-22 RX ORDER — ONDANSETRON 2 MG/ML
INJECTION INTRAMUSCULAR; INTRAVENOUS AS NEEDED
Status: DISCONTINUED | OUTPATIENT
Start: 2020-06-22 | End: 2020-06-22 | Stop reason: SURG

## 2020-06-22 RX ORDER — DIPHENHYDRAMINE HYDROCHLORIDE 50 MG/ML
25 INJECTION INTRAMUSCULAR; INTRAVENOUS EVERY 4 HOURS PRN
Status: DISCONTINUED | OUTPATIENT
Start: 2020-06-22 | End: 2020-06-26 | Stop reason: HOSPADM

## 2020-06-22 RX ORDER — FENTANYL CITRATE 50 UG/ML
INJECTION, SOLUTION INTRAMUSCULAR; INTRAVENOUS AS NEEDED
Status: DISCONTINUED | OUTPATIENT
Start: 2020-06-22 | End: 2020-06-22 | Stop reason: SURG

## 2020-06-22 RX ORDER — PROMETHAZINE HYDROCHLORIDE 12.5 MG/1
12.5 SUPPOSITORY RECTAL EVERY 6 HOURS PRN
Status: DISCONTINUED | OUTPATIENT
Start: 2020-06-22 | End: 2020-06-22 | Stop reason: HOSPADM

## 2020-06-22 RX ORDER — MAGNESIUM HYDROXIDE 1200 MG/15ML
3000 LIQUID ORAL ONCE AS NEEDED
Status: DISCONTINUED | OUTPATIENT
Start: 2020-06-22 | End: 2020-06-22

## 2020-06-22 RX ORDER — OXYTOCIN-SODIUM CHLORIDE 0.9% IV SOLN 30 UNIT/500ML 30-0.9/5 UT/ML-%
999 SOLUTION INTRAVENOUS ONCE
Status: DISCONTINUED | OUTPATIENT
Start: 2020-06-22 | End: 2020-06-26 | Stop reason: HOSPADM

## 2020-06-22 RX ORDER — PROMETHAZINE HYDROCHLORIDE 25 MG/1
12.5 TABLET ORAL EVERY 6 HOURS PRN
Status: DISCONTINUED | OUTPATIENT
Start: 2020-06-22 | End: 2020-06-22 | Stop reason: HOSPADM

## 2020-06-22 RX ORDER — METHYLERGONOVINE MALEATE 0.2 MG/ML
200 INJECTION INTRAVENOUS ONCE AS NEEDED
Status: DISCONTINUED | OUTPATIENT
Start: 2020-06-22 | End: 2020-06-26 | Stop reason: HOSPADM

## 2020-06-22 RX ORDER — SIMETHICONE 80 MG
80 TABLET,CHEWABLE ORAL 4 TIMES DAILY PRN
Status: DISCONTINUED | OUTPATIENT
Start: 2020-06-22 | End: 2020-06-26 | Stop reason: HOSPADM

## 2020-06-22 RX ORDER — CARBOPROST TROMETHAMINE 250 UG/ML
250 INJECTION, SOLUTION INTRAMUSCULAR
Status: DISCONTINUED | OUTPATIENT
Start: 2020-06-22 | End: 2020-06-26 | Stop reason: HOSPADM

## 2020-06-22 RX ORDER — CEFAZOLIN SODIUM 2 G/50ML
2 SOLUTION INTRAVENOUS ONCE
Status: COMPLETED | OUTPATIENT
Start: 2020-06-22 | End: 2020-06-22

## 2020-06-22 RX ORDER — SODIUM CHLORIDE 0.9 % (FLUSH) 0.9 %
3 SYRINGE (ML) INJECTION EVERY 12 HOURS SCHEDULED
Status: DISCONTINUED | OUTPATIENT
Start: 2020-06-22 | End: 2020-06-26 | Stop reason: HOSPADM

## 2020-06-22 RX ORDER — MORPHINE SULFATE 0.5 MG/ML
INJECTION, SOLUTION EPIDURAL; INTRATHECAL; INTRAVENOUS AS NEEDED
Status: DISCONTINUED | OUTPATIENT
Start: 2020-06-22 | End: 2020-06-22 | Stop reason: SURG

## 2020-06-22 RX ORDER — OXYTOCIN-SODIUM CHLORIDE 0.9% IV SOLN 30 UNIT/500ML 30-0.9/5 UT/ML-%
250 SOLUTION INTRAVENOUS CONTINUOUS
Status: ACTIVE | OUTPATIENT
Start: 2020-06-22 | End: 2020-06-22

## 2020-06-22 RX ORDER — OXYTOCIN-SODIUM CHLORIDE 0.9% IV SOLN 30 UNIT/500ML 30-0.9/5 UT/ML-%
SOLUTION INTRAVENOUS
Status: COMPLETED
Start: 2020-06-22 | End: 2020-06-22

## 2020-06-22 RX ORDER — SODIUM CHLORIDE, SODIUM LACTATE, POTASSIUM CHLORIDE, CALCIUM CHLORIDE 600; 310; 30; 20 MG/100ML; MG/100ML; MG/100ML; MG/100ML
125 INJECTION, SOLUTION INTRAVENOUS CONTINUOUS
Status: DISCONTINUED | OUTPATIENT
Start: 2020-06-22 | End: 2020-06-26 | Stop reason: HOSPADM

## 2020-06-22 RX ORDER — SODIUM CHLORIDE 0.9 % (FLUSH) 0.9 %
10 SYRINGE (ML) INJECTION AS NEEDED
Status: DISCONTINUED | OUTPATIENT
Start: 2020-06-22 | End: 2020-06-22

## 2020-06-22 RX ORDER — NIFEDIPINE 30 MG/1
30 TABLET, FILM COATED, EXTENDED RELEASE ORAL
Status: DISCONTINUED | OUTPATIENT
Start: 2020-06-22 | End: 2020-06-26 | Stop reason: HOSPADM

## 2020-06-22 RX ORDER — MISOPROSTOL 100 UG/1
600 TABLET ORAL ONCE AS NEEDED
Status: DISCONTINUED | OUTPATIENT
Start: 2020-06-22 | End: 2020-06-26 | Stop reason: HOSPADM

## 2020-06-22 RX ORDER — MIDAZOLAM HYDROCHLORIDE 1 MG/ML
1 INJECTION INTRAMUSCULAR; INTRAVENOUS
Status: DISCONTINUED | OUTPATIENT
Start: 2020-06-22 | End: 2020-06-22

## 2020-06-22 RX ORDER — KETOROLAC TROMETHAMINE 30 MG/ML
30 INJECTION, SOLUTION INTRAMUSCULAR; INTRAVENOUS EVERY 6 HOURS PRN
Status: DISPENSED | OUTPATIENT
Start: 2020-06-22 | End: 2020-06-23

## 2020-06-22 RX ORDER — HYDROMORPHONE HYDROCHLORIDE 1 MG/ML
0.5 INJECTION, SOLUTION INTRAMUSCULAR; INTRAVENOUS; SUBCUTANEOUS
Status: DISCONTINUED | OUTPATIENT
Start: 2020-06-22 | End: 2020-06-26 | Stop reason: HOSPADM

## 2020-06-22 RX ORDER — LANOLIN 100 %
OINTMENT (GRAM) TOPICAL
Status: DISCONTINUED | OUTPATIENT
Start: 2020-06-22 | End: 2020-06-26 | Stop reason: HOSPADM

## 2020-06-22 RX ORDER — MAGNESIUM HYDROXIDE 1200 MG/15ML
LIQUID ORAL AS NEEDED
Status: DISCONTINUED | OUTPATIENT
Start: 2020-06-22 | End: 2020-06-26 | Stop reason: HOSPADM

## 2020-06-22 RX ORDER — OXYTOCIN-SODIUM CHLORIDE 0.9% IV SOLN 30 UNIT/500ML 30-0.9/5 UT/ML-%
125 SOLUTION INTRAVENOUS CONTINUOUS PRN
Status: DISCONTINUED | OUTPATIENT
Start: 2020-06-22 | End: 2020-06-26 | Stop reason: HOSPADM

## 2020-06-22 RX ORDER — OXYCODONE HYDROCHLORIDE AND ACETAMINOPHEN 5; 325 MG/1; MG/1
1 TABLET ORAL EVERY 4 HOURS PRN
Status: DISCONTINUED | OUTPATIENT
Start: 2020-06-22 | End: 2020-06-26 | Stop reason: HOSPADM

## 2020-06-22 RX ORDER — ONDANSETRON 2 MG/ML
4 INJECTION INTRAMUSCULAR; INTRAVENOUS EVERY 6 HOURS PRN
Status: DISCONTINUED | OUTPATIENT
Start: 2020-06-22 | End: 2020-06-26 | Stop reason: HOSPADM

## 2020-06-22 RX ORDER — OXYCODONE AND ACETAMINOPHEN 10; 325 MG/1; MG/1
1 TABLET ORAL EVERY 4 HOURS PRN
Status: DISCONTINUED | OUTPATIENT
Start: 2020-06-22 | End: 2020-06-26 | Stop reason: HOSPADM

## 2020-06-22 RX ORDER — MISOPROSTOL 100 UG/1
600 TABLET ORAL AS NEEDED
Status: DISCONTINUED | OUTPATIENT
Start: 2020-06-22 | End: 2020-06-22 | Stop reason: HOSPADM

## 2020-06-22 RX ORDER — BUPIVACAINE HYDROCHLORIDE 7.5 MG/ML
INJECTION, SOLUTION EPIDURAL; RETROBULBAR AS NEEDED
Status: DISCONTINUED | OUTPATIENT
Start: 2020-06-22 | End: 2020-06-22 | Stop reason: SURG

## 2020-06-22 RX ORDER — SODIUM CHLORIDE 0.9 % (FLUSH) 0.9 %
10 SYRINGE (ML) INJECTION EVERY 12 HOURS SCHEDULED
Status: DISCONTINUED | OUTPATIENT
Start: 2020-06-22 | End: 2020-06-26 | Stop reason: HOSPADM

## 2020-06-22 RX ORDER — SODIUM CHLORIDE 0.9 % (FLUSH) 0.9 %
3-10 SYRINGE (ML) INJECTION AS NEEDED
Status: DISCONTINUED | OUTPATIENT
Start: 2020-06-22 | End: 2020-06-26 | Stop reason: HOSPADM

## 2020-06-22 RX ORDER — ONDANSETRON 4 MG/1
4 TABLET, FILM COATED ORAL EVERY 6 HOURS PRN
Status: DISCONTINUED | OUTPATIENT
Start: 2020-06-22 | End: 2020-06-22 | Stop reason: HOSPADM

## 2020-06-22 RX ADMIN — OXYCODONE HYDROCHLORIDE AND ACETAMINOPHEN 1 TABLET: 10; 325 TABLET ORAL at 22:03

## 2020-06-22 RX ADMIN — NIFEDIPINE 30 MG: 30 TABLET, EXTENDED RELEASE ORAL at 16:49

## 2020-06-22 RX ADMIN — BETAMETHASONE SODIUM PHOSPHATE AND BETAMETHASONE ACETATE 12 MG: 3; 3 INJECTION, SUSPENSION INTRA-ARTICULAR; INTRALESIONAL; INTRAMUSCULAR; SOFT TISSUE at 01:34

## 2020-06-22 RX ADMIN — HYDROMORPHONE HYDROCHLORIDE 0.5 MG: 1 INJECTION, SOLUTION INTRAMUSCULAR; INTRAVENOUS; SUBCUTANEOUS at 19:35

## 2020-06-22 RX ADMIN — MORPHINE SULFATE 4.75 MG: 0.5 INJECTION EPIDURAL; INTRATHECAL; INTRAVENOUS at 12:55

## 2020-06-22 RX ADMIN — SODIUM CHLORIDE, POTASSIUM CHLORIDE, SODIUM LACTATE AND CALCIUM CHLORIDE 125 ML/HR: 600; 310; 30; 20 INJECTION, SOLUTION INTRAVENOUS at 09:48

## 2020-06-22 RX ADMIN — FENTANYL CITRATE 25 MCG: 50 INJECTION, SOLUTION INTRAMUSCULAR; INTRAVENOUS at 12:24

## 2020-06-22 RX ADMIN — SODIUM CHLORIDE, POTASSIUM CHLORIDE, SODIUM LACTATE AND CALCIUM CHLORIDE 125 ML/HR: 600; 310; 30; 20 INJECTION, SOLUTION INTRAVENOUS at 01:35

## 2020-06-22 RX ADMIN — KETOROLAC TROMETHAMINE 30 MG: 30 INJECTION, SOLUTION INTRAMUSCULAR at 14:21

## 2020-06-22 RX ADMIN — NIFEDIPINE 20 MG: 10 CAPSULE ORAL at 08:17

## 2020-06-22 RX ADMIN — HYDROXYZINE HYDROCHLORIDE 25 MG: 50 TABLET ORAL at 14:48

## 2020-06-22 RX ADMIN — IBUPROFEN 800 MG: 800 TABLET, FILM COATED ORAL at 22:04

## 2020-06-22 RX ADMIN — SODIUM CHLORIDE, POTASSIUM CHLORIDE, SODIUM LACTATE AND CALCIUM CHLORIDE: 600; 310; 30; 20 INJECTION, SOLUTION INTRAVENOUS at 12:18

## 2020-06-22 RX ADMIN — SIMETHICONE 80 MG: 80 TABLET, CHEWABLE ORAL at 22:04

## 2020-06-22 RX ADMIN — EPHEDRINE SULFATE 5 MG: 50 INJECTION, SOLUTION INTRAVENOUS at 12:26

## 2020-06-22 RX ADMIN — SODIUM CHLORIDE, POTASSIUM CHLORIDE, SODIUM LACTATE AND CALCIUM CHLORIDE 1000 ML: 600; 310; 30; 20 INJECTION, SOLUTION INTRAVENOUS at 11:45

## 2020-06-22 RX ADMIN — MORPHINE SULFATE 2 MG: 4 INJECTION, SOLUTION INTRAMUSCULAR; INTRAVENOUS at 14:03

## 2020-06-22 RX ADMIN — MORPHINE SULFATE 0.25 MG: 0.5 INJECTION EPIDURAL; INTRATHECAL; INTRAVENOUS at 12:24

## 2020-06-22 RX ADMIN — NIFEDIPINE 20 MG: 10 CAPSULE ORAL at 03:44

## 2020-06-22 RX ADMIN — CEFAZOLIN SODIUM 2 G: 2 SOLUTION INTRAVENOUS at 12:24

## 2020-06-22 RX ADMIN — FENTANYL CITRATE 75 MCG: 50 INJECTION, SOLUTION INTRAMUSCULAR; INTRAVENOUS at 12:55

## 2020-06-22 RX ADMIN — BUPIVACAINE HYDROCHLORIDE 1.4 ML: 7.5 INJECTION, SOLUTION EPIDURAL; RETROBULBAR at 12:24

## 2020-06-22 RX ADMIN — OXYTOCIN 999 ML/HR: 10 INJECTION INTRAVENOUS at 12:24

## 2020-06-22 RX ADMIN — HYDROMORPHONE HYDROCHLORIDE 0.5 MG: 1 INJECTION, SOLUTION INTRAMUSCULAR; INTRAVENOUS; SUBCUTANEOUS at 14:49

## 2020-06-22 RX ADMIN — ONDANSETRON 8 MG: 2 INJECTION INTRAMUSCULAR; INTRAVENOUS at 12:52

## 2020-06-22 RX ADMIN — DIPHENHYDRAMINE HYDROCHLORIDE 25 MG: 25 CAPSULE ORAL at 22:03

## 2020-06-22 RX ADMIN — Medication 1 TABLET: at 08:17

## 2020-06-22 RX ADMIN — SODIUM CHLORIDE, POTASSIUM CHLORIDE, SODIUM LACTATE AND CALCIUM CHLORIDE 125 ML/HR: 600; 310; 30; 20 INJECTION, SOLUTION INTRAVENOUS at 16:49

## 2020-06-22 NOTE — ANESTHESIA PROCEDURE NOTES
Spinal Block      Patient location during procedure: OR  Performed By  CRNA: Margo Monsivais CRNA  Preanesthetic Checklist  Completed: patient identified, site marked, surgical consent, pre-op evaluation, timeout performed, IV checked, risks and benefits discussed and monitors and equipment checked  Spinal Block Prep:  Patient Position:sitting  Sterile Tech:cap, gloves and sterile barriers  Prep:Betadine  Patient Monitoring:EKG, continuous pulse oximetry and blood pressure monitoring  Spinal Block Procedure  Approach:midline  Guidance:landmark technique  Location:L4-L5  Needle Type:Pencan  Needle Gauge:24 G  Placement of Spinal needle event:cerebrospinal fluid aspirated    Fluid Appearance:clear     Post Assessment  Patient Tolerance:patient tolerated the procedure well with no apparent complications  Complications no

## 2020-06-22 NOTE — ANESTHESIA POSTPROCEDURE EVALUATION
Patient: Kacy Woo    Procedure Summary     Date:  20 Room / Location:   [facility] /  [facility]    Anesthesia Start:  1218 Anesthesia Stop:  1305    Procedure:   SECTION REPEAT (N/A Abdomen) Diagnosis:  (REPEAT  SECTION)    Surgeon:  Sonya Barton DO Provider:  Sabas Gates MD    Anesthesia Type:  spinal ASA Status:  3          Anesthesia Type: spinal    Vitals  Vitals Value Taken Time   /71 2020  1:52 PM   Temp 97.8 °F (36.6 °C) 2020  1:05 PM   Pulse 84 2020  1:05 PM   Resp 18 2020  1:52 PM   SpO2 100 % 2020  1:54 PM   Vitals shown include unvalidated device data.        Post Anesthesia Care and Evaluation    Patient location during evaluation: bedside  Patient participation: complete - patient participated  Level of consciousness: awake and alert  Pain score: 1  Pain management: adequate  Airway patency: patent  Anesthetic complications: No anesthetic complications  PONV Status: none  Cardiovascular status: acceptable  Respiratory status: acceptable  Hydration status: acceptable

## 2020-06-22 NOTE — ANESTHESIA PREPROCEDURE EVALUATION
Anesthesia Evaluation     no history of anesthetic complications:  NPO Solid Status: > 6 hours  NPO Liquid Status: > 6 hours           Airway   Mallampati: I  TM distance: >3 FB  Neck ROM: full  No difficulty expected  Dental - normal exam     Pulmonary - normal exam   Cardiovascular - normal exam    (+) hypertension,       Neuro/Psych  GI/Hepatic/Renal/Endo    (+)   hepatitis C, liver disease, renal disease,     Musculoskeletal     Abdominal  - normal exam    Bowel sounds: normal.   Substance History      OB/GYN    (+) Pregnant,         Other                        Anesthesia Plan    ASA 3     spinal       Anesthetic plan, all risks, benefits, and alternatives have been provided, discussed and informed consent has been obtained with: patient.

## 2020-06-23 LAB
BASOPHILS # BLD AUTO: 0.02 10*3/MM3 (ref 0–0.2)
BASOPHILS NFR BLD AUTO: 0.1 % (ref 0–1.5)
DEPRECATED RDW RBC AUTO: 42 FL (ref 37–54)
EOSINOPHIL # BLD AUTO: 0 10*3/MM3 (ref 0–0.4)
EOSINOPHIL NFR BLD AUTO: 0 % (ref 0.3–6.2)
ERYTHROCYTE [DISTWIDTH] IN BLOOD BY AUTOMATED COUNT: 13.2 % (ref 12.3–15.4)
HCT VFR BLD AUTO: 25.4 % (ref 34–46.6)
HGB BLD-MCNC: 8.4 G/DL (ref 12–15.9)
IMM GRANULOCYTES # BLD AUTO: 0.26 10*3/MM3 (ref 0–0.05)
IMM GRANULOCYTES NFR BLD AUTO: 1.1 % (ref 0–0.5)
LYMPHOCYTES # BLD AUTO: 1.97 10*3/MM3 (ref 0.7–3.1)
LYMPHOCYTES NFR BLD AUTO: 8.4 % (ref 19.6–45.3)
MCH RBC QN AUTO: 29 PG (ref 26.6–33)
MCHC RBC AUTO-ENTMCNC: 33.1 G/DL (ref 31.5–35.7)
MCV RBC AUTO: 87.6 FL (ref 79–97)
MONOCYTES # BLD AUTO: 0.89 10*3/MM3 (ref 0.1–0.9)
MONOCYTES NFR BLD AUTO: 3.8 % (ref 5–12)
NEUTROPHILS # BLD AUTO: 20.25 10*3/MM3 (ref 1.7–7)
NEUTROPHILS NFR BLD AUTO: 86.6 % (ref 42.7–76)
NRBC BLD AUTO-RTO: 0 /100 WBC (ref 0–0.2)
PLATELET # BLD AUTO: 287 10*3/MM3 (ref 140–450)
PMV BLD AUTO: 12.1 FL (ref 6–12)
RBC # BLD AUTO: 2.9 10*6/MM3 (ref 3.77–5.28)
WBC NRBC COR # BLD: 23.39 10*3/MM3 (ref 3.4–10.8)

## 2020-06-23 PROCEDURE — 63710000001 DIPHENHYDRAMINE PER 50 MG: Performed by: OBSTETRICS & GYNECOLOGY

## 2020-06-23 PROCEDURE — 85025 COMPLETE CBC W/AUTO DIFF WBC: CPT | Performed by: OBSTETRICS & GYNECOLOGY

## 2020-06-23 RX ORDER — LABETALOL 100 MG/1
200 TABLET, FILM COATED ORAL AS NEEDED
Status: DISCONTINUED | OUTPATIENT
Start: 2020-06-23 | End: 2020-06-26 | Stop reason: HOSPADM

## 2020-06-23 RX ADMIN — DIPHENHYDRAMINE HYDROCHLORIDE 25 MG: 25 CAPSULE ORAL at 23:58

## 2020-06-23 RX ADMIN — IBUPROFEN 800 MG: 800 TABLET, FILM COATED ORAL at 22:00

## 2020-06-23 RX ADMIN — SIMETHICONE 80 MG: 80 TABLET, CHEWABLE ORAL at 22:00

## 2020-06-23 RX ADMIN — OXYCODONE HYDROCHLORIDE AND ACETAMINOPHEN 1 TABLET: 10; 325 TABLET ORAL at 21:59

## 2020-06-23 RX ADMIN — IBUPROFEN 800 MG: 800 TABLET, FILM COATED ORAL at 12:59

## 2020-06-23 RX ADMIN — NIFEDIPINE 30 MG: 30 TABLET, EXTENDED RELEASE ORAL at 08:48

## 2020-06-23 RX ADMIN — OXYCODONE HYDROCHLORIDE AND ACETAMINOPHEN 1 TABLET: 10; 325 TABLET ORAL at 11:26

## 2020-06-23 RX ADMIN — DIPHENHYDRAMINE HYDROCHLORIDE 25 MG: 25 CAPSULE ORAL at 05:39

## 2020-06-23 RX ADMIN — OXYCODONE HYDROCHLORIDE AND ACETAMINOPHEN 1 TABLET: 10; 325 TABLET ORAL at 05:38

## 2020-06-23 RX ADMIN — HYDROXYZINE HYDROCHLORIDE 50 MG: 50 TABLET ORAL at 21:59

## 2020-06-23 RX ADMIN — IBUPROFEN 800 MG: 800 TABLET, FILM COATED ORAL at 05:38

## 2020-06-23 RX ADMIN — DIPHENHYDRAMINE HYDROCHLORIDE 25 MG: 25 CAPSULE ORAL at 13:05

## 2020-06-23 RX ADMIN — DIPHENHYDRAMINE HYDROCHLORIDE 25 MG: 25 CAPSULE ORAL at 01:56

## 2020-06-23 RX ADMIN — HYDROXYZINE HYDROCHLORIDE 50 MG: 50 TABLET ORAL at 15:00

## 2020-06-23 RX ADMIN — SIMETHICONE 80 MG: 80 TABLET, CHEWABLE ORAL at 05:38

## 2020-06-23 RX ADMIN — Medication: at 08:48

## 2020-06-23 RX ADMIN — OXYCODONE HYDROCHLORIDE AND ACETAMINOPHEN 1 TABLET: 10; 325 TABLET ORAL at 01:56

## 2020-06-23 RX ADMIN — OXYCODONE HYDROCHLORIDE AND ACETAMINOPHEN 1 TABLET: 10; 325 TABLET ORAL at 15:22

## 2020-06-24 PROBLEM — O47.9 UTERINE CONTRACTIONS DURING PREGNANCY: Status: RESOLVED | Noted: 2020-06-21 | Resolved: 2020-06-24

## 2020-06-24 PROBLEM — O36.5990 IUGR (INTRAUTERINE GROWTH RESTRICTION) AFFECTING CARE OF MOTHER: Status: RESOLVED | Noted: 2020-06-21 | Resolved: 2020-06-24

## 2020-06-24 LAB
APTT PPP: 24.6 SECONDS (ref 25.6–35.3)
BASOPHILS # BLD AUTO: 0.03 10*3/MM3 (ref 0–0.2)
BASOPHILS NFR BLD AUTO: 0.2 % (ref 0–1.5)
DEPRECATED RDW RBC AUTO: 43.8 FL (ref 37–54)
EOSINOPHIL # BLD AUTO: 0.04 10*3/MM3 (ref 0–0.4)
EOSINOPHIL NFR BLD AUTO: 0.2 % (ref 0.3–6.2)
ERYTHROCYTE [DISTWIDTH] IN BLOOD BY AUTOMATED COUNT: 13.5 % (ref 12.3–15.4)
HCT VFR BLD AUTO: 25.5 % (ref 34–46.6)
HGB BLD-MCNC: 8.2 G/DL (ref 12–15.9)
IMM GRANULOCYTES # BLD AUTO: 0.35 10*3/MM3 (ref 0–0.05)
IMM GRANULOCYTES NFR BLD AUTO: 2.1 % (ref 0–0.5)
INR PPP: 0.88 (ref 0.9–1.1)
LYMPHOCYTES # BLD AUTO: 2.62 10*3/MM3 (ref 0.7–3.1)
LYMPHOCYTES NFR BLD AUTO: 15.6 % (ref 19.6–45.3)
MCH RBC QN AUTO: 28.9 PG (ref 26.6–33)
MCHC RBC AUTO-ENTMCNC: 32.2 G/DL (ref 31.5–35.7)
MCV RBC AUTO: 89.8 FL (ref 79–97)
MONOCYTES # BLD AUTO: 1.17 10*3/MM3 (ref 0.1–0.9)
MONOCYTES NFR BLD AUTO: 7 % (ref 5–12)
NEUTROPHILS # BLD AUTO: 12.59 10*3/MM3 (ref 1.7–7)
NEUTROPHILS NFR BLD AUTO: 74.9 % (ref 42.7–76)
NRBC BLD AUTO-RTO: 0.1 /100 WBC (ref 0–0.2)
PLATELET # BLD AUTO: 272 10*3/MM3 (ref 140–450)
PMV BLD AUTO: 12.1 FL (ref 6–12)
PROTHROMBIN TIME: 11.8 SECONDS (ref 11.9–14.1)
RBC # BLD AUTO: 2.84 10*6/MM3 (ref 3.77–5.28)
WBC NRBC COR # BLD: 16.8 10*3/MM3 (ref 3.4–10.8)

## 2020-06-24 PROCEDURE — 85610 PROTHROMBIN TIME: CPT | Performed by: OBSTETRICS & GYNECOLOGY

## 2020-06-24 PROCEDURE — 85025 COMPLETE CBC W/AUTO DIFF WBC: CPT | Performed by: OBSTETRICS & GYNECOLOGY

## 2020-06-24 PROCEDURE — 63710000001 DIPHENHYDRAMINE PER 50 MG: Performed by: OBSTETRICS & GYNECOLOGY

## 2020-06-24 PROCEDURE — 85730 THROMBOPLASTIN TIME PARTIAL: CPT | Performed by: OBSTETRICS & GYNECOLOGY

## 2020-06-24 PROCEDURE — 25010000002 CEFTRIAXONE PER 250 MG: Performed by: OBSTETRICS & GYNECOLOGY

## 2020-06-24 RX ORDER — IBUPROFEN 800 MG/1
800 TABLET ORAL EVERY 8 HOURS SCHEDULED
Qty: 30 TABLET | Refills: 0 | Status: SHIPPED | OUTPATIENT
Start: 2020-06-24 | End: 2020-09-08

## 2020-06-24 RX ORDER — NIFEDIPINE 30 MG/1
30 TABLET, EXTENDED RELEASE ORAL DAILY
Qty: 30 TABLET | Refills: 1 | Status: SHIPPED | OUTPATIENT
Start: 2020-06-24 | End: 2020-09-08

## 2020-06-24 RX ADMIN — SIMETHICONE 80 MG: 80 TABLET, CHEWABLE ORAL at 20:44

## 2020-06-24 RX ADMIN — IBUPROFEN 800 MG: 800 TABLET, FILM COATED ORAL at 05:00

## 2020-06-24 RX ADMIN — LABETALOL HYDROCHLORIDE 200 MG: 100 TABLET, FILM COATED ORAL at 04:59

## 2020-06-24 RX ADMIN — SIMETHICONE 80 MG: 80 TABLET, CHEWABLE ORAL at 05:00

## 2020-06-24 RX ADMIN — OXYCODONE HYDROCHLORIDE AND ACETAMINOPHEN 1 TABLET: 10; 325 TABLET ORAL at 05:04

## 2020-06-24 RX ADMIN — LIDOCAINE HYDROCHLORIDE 1 G: 10 INJECTION, SOLUTION EPIDURAL; INFILTRATION; INTRACAUDAL; PERINEURAL at 17:57

## 2020-06-24 RX ADMIN — OXYCODONE HYDROCHLORIDE AND ACETAMINOPHEN 1 TABLET: 10; 325 TABLET ORAL at 16:20

## 2020-06-24 RX ADMIN — NIFEDIPINE 30 MG: 30 TABLET, EXTENDED RELEASE ORAL at 09:07

## 2020-06-24 RX ADMIN — HYDROXYZINE HYDROCHLORIDE 50 MG: 50 TABLET ORAL at 20:43

## 2020-06-24 RX ADMIN — IBUPROFEN 800 MG: 800 TABLET, FILM COATED ORAL at 14:25

## 2020-06-24 RX ADMIN — OXYCODONE HYDROCHLORIDE AND ACETAMINOPHEN 1 TABLET: 10; 325 TABLET ORAL at 20:44

## 2020-06-24 RX ADMIN — DIPHENHYDRAMINE HYDROCHLORIDE 25 MG: 25 CAPSULE ORAL at 04:59

## 2020-06-25 PROCEDURE — 25010000002 CEFTRIAXONE PER 250 MG: Performed by: OBSTETRICS & GYNECOLOGY

## 2020-06-25 RX ORDER — PHENAZOPYRIDINE HYDROCHLORIDE 100 MG/1
100 TABLET, FILM COATED ORAL
Status: DISCONTINUED | OUTPATIENT
Start: 2020-06-25 | End: 2020-06-26 | Stop reason: HOSPADM

## 2020-06-25 RX ADMIN — IBUPROFEN 800 MG: 800 TABLET, FILM COATED ORAL at 20:42

## 2020-06-25 RX ADMIN — PHENAZOPYRIDINE HYDROCHLORIDE 100 MG: 100 TABLET ORAL at 18:15

## 2020-06-25 RX ADMIN — OXYCODONE HYDROCHLORIDE AND ACETAMINOPHEN 1 TABLET: 10; 325 TABLET ORAL at 00:28

## 2020-06-25 RX ADMIN — LABETALOL HYDROCHLORIDE 200 MG: 100 TABLET, FILM COATED ORAL at 00:37

## 2020-06-25 RX ADMIN — IBUPROFEN 800 MG: 800 TABLET, FILM COATED ORAL at 14:32

## 2020-06-25 RX ADMIN — OXYCODONE HYDROCHLORIDE AND ACETAMINOPHEN 1 TABLET: 10; 325 TABLET ORAL at 16:49

## 2020-06-25 RX ADMIN — NIFEDIPINE 30 MG: 30 TABLET, EXTENDED RELEASE ORAL at 09:02

## 2020-06-25 RX ADMIN — CEFTRIAXONE 1 G: 1 INJECTION, POWDER, FOR SOLUTION INTRAMUSCULAR; INTRAVENOUS at 16:22

## 2020-06-25 RX ADMIN — OXYCODONE HYDROCHLORIDE AND ACETAMINOPHEN 1 TABLET: 10; 325 TABLET ORAL at 10:41

## 2020-06-26 VITALS
TEMPERATURE: 98.2 F | HEART RATE: 98 BPM | RESPIRATION RATE: 18 BRPM | WEIGHT: 155 LBS | SYSTOLIC BLOOD PRESSURE: 132 MMHG | BODY MASS INDEX: 28.52 KG/M2 | OXYGEN SATURATION: 97 % | HEIGHT: 62 IN | DIASTOLIC BLOOD PRESSURE: 95 MMHG

## 2020-06-26 RX ORDER — OXYCODONE HYDROCHLORIDE AND ACETAMINOPHEN 5; 325 MG/1; MG/1
2 TABLET ORAL EVERY 4 HOURS PRN
Qty: 10 TABLET | Refills: 0 | Status: SHIPPED | OUTPATIENT
Start: 2020-06-26 | End: 2020-06-26

## 2020-06-26 RX ORDER — HYDROXYZINE HYDROCHLORIDE 10 MG/1
10 TABLET, FILM COATED ORAL EVERY 8 HOURS PRN
Qty: 10 TABLET | Refills: 0 | Status: SHIPPED | OUTPATIENT
Start: 2020-06-26 | End: 2021-06-05

## 2020-06-26 RX ADMIN — OXYCODONE HYDROCHLORIDE AND ACETAMINOPHEN 1 TABLET: 10; 325 TABLET ORAL at 00:15

## 2020-06-26 RX ADMIN — PHENAZOPYRIDINE HYDROCHLORIDE 100 MG: 100 TABLET ORAL at 11:14

## 2020-06-26 RX ADMIN — PHENAZOPYRIDINE HYDROCHLORIDE 100 MG: 100 TABLET ORAL at 07:44

## 2020-06-26 RX ADMIN — Medication: at 08:32

## 2020-06-26 RX ADMIN — NIFEDIPINE 30 MG: 30 TABLET, EXTENDED RELEASE ORAL at 08:32

## 2020-06-26 RX ADMIN — OXYCODONE HYDROCHLORIDE AND ACETAMINOPHEN 1 TABLET: 10; 325 TABLET ORAL at 11:14

## 2020-06-26 RX ADMIN — OXYCODONE HYDROCHLORIDE AND ACETAMINOPHEN 1 TABLET: 10; 325 TABLET ORAL at 07:44

## 2020-06-26 RX ADMIN — OXYCODONE HYDROCHLORIDE AND ACETAMINOPHEN 1 TABLET: 10; 325 TABLET ORAL at 16:05

## 2020-06-27 ENCOUNTER — HOSPITAL ENCOUNTER (EMERGENCY)
Facility: HOSPITAL | Age: 35
Discharge: HOME | End: 2020-06-27
Attending: EMERGENCY MEDICINE | Admitting: EMERGENCY MEDICINE

## 2020-06-27 VITALS
BODY MASS INDEX: 27.79 KG/M2 | WEIGHT: 151 LBS | HEIGHT: 62 IN | SYSTOLIC BLOOD PRESSURE: 149 MMHG | OXYGEN SATURATION: 98 % | TEMPERATURE: 99.1 F | DIASTOLIC BLOOD PRESSURE: 97 MMHG | RESPIRATION RATE: 18 BRPM | HEART RATE: 87 BPM

## 2020-06-27 DIAGNOSIS — I10 ESSENTIAL HYPERTENSION: Primary | ICD-10-CM

## 2020-06-27 LAB
6-ACETYL MORPHINE: NEGATIVE
ALBUMIN SERPL-MCNC: 3.15 G/DL (ref 3.5–5.2)
ALBUMIN/GLOB SERPL: 0.9 G/DL
ALP SERPL-CCNC: 159 U/L (ref 39–117)
ALT SERPL W P-5'-P-CCNC: 26 U/L (ref 1–33)
AMPHET+METHAMPHET UR QL: NEGATIVE
ANION GAP SERPL CALCULATED.3IONS-SCNC: 12.1 MMOL/L (ref 5–15)
AST SERPL-CCNC: 23 U/L (ref 1–32)
BACTERIA UR QL AUTO: ABNORMAL /HPF
BARBITURATES UR QL SCN: NEGATIVE
BASOPHILS # BLD AUTO: 0.05 10*3/MM3 (ref 0–0.2)
BASOPHILS NFR BLD AUTO: 0.3 % (ref 0–1.5)
BENZODIAZ UR QL SCN: NEGATIVE
BILIRUB SERPL-MCNC: <0.2 MG/DL (ref 0.2–1.2)
BILIRUB UR QL STRIP: NEGATIVE
BUN BLD-MCNC: 19 MG/DL (ref 6–20)
BUN/CREAT SERPL: 24.7 (ref 7–25)
BUPRENORPHINE SERPL-MCNC: NEGATIVE NG/ML
CALCIUM SPEC-SCNC: 9 MG/DL (ref 8.6–10.5)
CANNABINOIDS SERPL QL: NEGATIVE
CHLORIDE SERPL-SCNC: 98 MMOL/L (ref 98–107)
CK SERPL-CCNC: 91 U/L (ref 20–180)
CLARITY UR: CLEAR
CO2 SERPL-SCNC: 26.9 MMOL/L (ref 22–29)
COCAINE UR QL: NEGATIVE
COLOR UR: YELLOW
CREAT BLD-MCNC: 0.77 MG/DL (ref 0.57–1)
DEPRECATED RDW RBC AUTO: 43.7 FL (ref 37–54)
EOSINOPHIL # BLD AUTO: 0.46 10*3/MM3 (ref 0–0.4)
EOSINOPHIL NFR BLD AUTO: 2.7 % (ref 0.3–6.2)
ERYTHROCYTE [DISTWIDTH] IN BLOOD BY AUTOMATED COUNT: 13.6 % (ref 12.3–15.4)
GFR SERPL CREATININE-BSD FRML MDRD: 86 ML/MIN/1.73
GLOBULIN UR ELPH-MCNC: 3.5 GM/DL
GLUCOSE BLD-MCNC: 107 MG/DL (ref 65–99)
GLUCOSE UR STRIP-MCNC: NEGATIVE MG/DL
HCG INTACT+B SERPL-ACNC: 993.9 MIU/ML
HCT VFR BLD AUTO: 29 % (ref 34–46.6)
HGB BLD-MCNC: 9.2 G/DL (ref 12–15.9)
HGB UR QL STRIP.AUTO: ABNORMAL
HYALINE CASTS UR QL AUTO: ABNORMAL /LPF
IMM GRANULOCYTES # BLD AUTO: 0.39 10*3/MM3 (ref 0–0.05)
IMM GRANULOCYTES NFR BLD AUTO: 2.3 % (ref 0–0.5)
KETONES UR QL STRIP: NEGATIVE
LEUKOCYTE ESTERASE UR QL STRIP.AUTO: ABNORMAL
LYMPHOCYTES # BLD AUTO: 3.34 10*3/MM3 (ref 0.7–3.1)
LYMPHOCYTES NFR BLD AUTO: 19.3 % (ref 19.6–45.3)
MCH RBC QN AUTO: 28.4 PG (ref 26.6–33)
MCHC RBC AUTO-ENTMCNC: 31.7 G/DL (ref 31.5–35.7)
MCV RBC AUTO: 89.5 FL (ref 79–97)
METHADONE UR QL SCN: NEGATIVE
MONOCYTES # BLD AUTO: 1.14 10*3/MM3 (ref 0.1–0.9)
MONOCYTES NFR BLD AUTO: 6.6 % (ref 5–12)
NEUTROPHILS # BLD AUTO: 11.95 10*3/MM3 (ref 1.7–7)
NEUTROPHILS NFR BLD AUTO: 68.8 % (ref 42.7–76)
NITRITE UR QL STRIP: NEGATIVE
NRBC BLD AUTO-RTO: 0.1 /100 WBC (ref 0–0.2)
OPIATES UR QL: POSITIVE
OXYCODONE UR QL SCN: POSITIVE
PCP UR QL SCN: NEGATIVE
PH UR STRIP.AUTO: 7.5 [PH] (ref 5–8)
PLATELET # BLD AUTO: 461 10*3/MM3 (ref 140–450)
PMV BLD AUTO: 10.3 FL (ref 6–12)
POTASSIUM BLD-SCNC: 3.7 MMOL/L (ref 3.5–5.2)
PROT SERPL-MCNC: 6.6 G/DL (ref 6–8.5)
PROT UR QL STRIP: ABNORMAL
RBC # BLD AUTO: 3.24 10*6/MM3 (ref 3.77–5.28)
RBC # UR: ABNORMAL /HPF
REF LAB TEST METHOD: ABNORMAL
SODIUM BLD-SCNC: 137 MMOL/L (ref 136–145)
SP GR UR STRIP: 1.01 (ref 1–1.03)
SQUAMOUS #/AREA URNS HPF: ABNORMAL /HPF
UROBILINOGEN UR QL STRIP: ABNORMAL
WBC NRBC COR # BLD: 17.33 10*3/MM3 (ref 3.4–10.8)
WBC UR QL AUTO: ABNORMAL /HPF

## 2020-06-27 PROCEDURE — 80307 DRUG TEST PRSMV CHEM ANLYZR: CPT | Performed by: EMERGENCY MEDICINE

## 2020-06-27 PROCEDURE — 82550 ASSAY OF CK (CPK): CPT | Performed by: EMERGENCY MEDICINE

## 2020-06-27 PROCEDURE — 85025 COMPLETE CBC W/AUTO DIFF WBC: CPT | Performed by: EMERGENCY MEDICINE

## 2020-06-27 PROCEDURE — 93005 ELECTROCARDIOGRAM TRACING: CPT | Performed by: EMERGENCY MEDICINE

## 2020-06-27 PROCEDURE — 80053 COMPREHEN METABOLIC PANEL: CPT | Performed by: EMERGENCY MEDICINE

## 2020-06-27 PROCEDURE — 84702 CHORIONIC GONADOTROPIN TEST: CPT | Performed by: EMERGENCY MEDICINE

## 2020-06-27 PROCEDURE — 93010 ELECTROCARDIOGRAM REPORT: CPT | Performed by: INTERNAL MEDICINE

## 2020-06-27 PROCEDURE — 99284 EMERGENCY DEPT VISIT MOD MDM: CPT

## 2020-06-27 PROCEDURE — 81001 URINALYSIS AUTO W/SCOPE: CPT | Performed by: EMERGENCY MEDICINE

## 2020-06-27 RX ORDER — SODIUM CHLORIDE 0.9 % (FLUSH) 0.9 %
10 SYRINGE (ML) INJECTION AS NEEDED
Status: DISCONTINUED | OUTPATIENT
Start: 2020-06-27 | End: 2020-06-27

## 2020-06-27 RX ORDER — NIFEDIPINE 10 MG/1
10 CAPSULE ORAL ONCE
Status: COMPLETED | OUTPATIENT
Start: 2020-06-27 | End: 2020-06-27

## 2020-06-27 RX ADMIN — NIFEDIPINE 10 MG: 10 CAPSULE, LIQUID FILLED ORAL at 02:00

## 2020-09-08 ENCOUNTER — OFFICE VISIT (OUTPATIENT)
Dept: UROLOGY | Facility: CLINIC | Age: 35
End: 2020-09-08

## 2020-09-08 VITALS — TEMPERATURE: 98.6 F | BODY MASS INDEX: 29.08 KG/M2 | HEIGHT: 62 IN | WEIGHT: 158 LBS

## 2020-09-08 DIAGNOSIS — R35.0 FREQUENCY OF MICTURITION: ICD-10-CM

## 2020-09-08 DIAGNOSIS — R31.0 GROSS HEMATURIA: ICD-10-CM

## 2020-09-08 DIAGNOSIS — R30.0 DYSURIA: ICD-10-CM

## 2020-09-08 DIAGNOSIS — N39.0 RECURRENT UTI: Primary | ICD-10-CM

## 2020-09-08 LAB
BILIRUB BLD-MCNC: NEGATIVE MG/DL
CLARITY, POC: CLEAR
COLOR UR: YELLOW
GLUCOSE UR STRIP-MCNC: NEGATIVE MG/DL
KETONES UR QL: NEGATIVE
LEUKOCYTE EST, POC: NEGATIVE
NITRITE UR-MCNC: NEGATIVE MG/ML
PH UR: 6 [PH] (ref 5–8)
PROT UR STRIP-MCNC: NEGATIVE MG/DL
RBC # UR STRIP: NEGATIVE /UL
SP GR UR: 1.01 (ref 1–1.03)
UROBILINOGEN UR QL: NORMAL

## 2020-09-08 PROCEDURE — 51798 US URINE CAPACITY MEASURE: CPT | Performed by: NURSE PRACTITIONER

## 2020-09-08 PROCEDURE — 99204 OFFICE O/P NEW MOD 45 MIN: CPT | Performed by: NURSE PRACTITIONER

## 2020-09-08 PROCEDURE — 87086 URINE CULTURE/COLONY COUNT: CPT | Performed by: NURSE PRACTITIONER

## 2020-09-08 RX ORDER — LISINOPRIL 20 MG/1
30 TABLET ORAL
COMMUNITY
Start: 2020-08-13 | End: 2020-09-30 | Stop reason: DRUGHIGH

## 2020-09-08 RX ORDER — NITROFURANTOIN 25; 75 MG/1; MG/1
100 CAPSULE ORAL DAILY
Qty: 56 CAPSULE | Refills: 3 | Status: SHIPPED | OUTPATIENT
Start: 2020-09-08 | End: 2021-06-05

## 2020-09-08 RX ORDER — PHENAZOPYRIDINE HYDROCHLORIDE 200 MG/1
200 TABLET, FILM COATED ORAL 3 TIMES DAILY PRN
Qty: 20 TABLET | Refills: 0 | Status: SHIPPED | OUTPATIENT
Start: 2020-09-08 | End: 2020-09-21 | Stop reason: SDUPTHER

## 2020-09-08 RX ORDER — HYDROXYZINE HYDROCHLORIDE 25 MG/1
25 TABLET, FILM COATED ORAL
COMMUNITY
Start: 2020-09-04 | End: 2020-09-08 | Stop reason: SDUPTHER

## 2020-09-08 NOTE — PROGRESS NOTES
"Chief Complaint:          Chief Complaint   Patient presents with   • Recurrent Uti     New Patient With Recurrent UTI /Gross Hematuria/Abdominal pain       HPI:   34 y.o. female.    Very Pleasant Female  Evaluated in clinic today with numerous complaints.    She has been referred by her primary care Ms Sonya Barton DO at the [facility] for recurrent UTIs, and dysuria. She is 3 months postpartum, following a .    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  She reports she has dealt with kidney/bladder infections all her life.  she reports burning on urination, Frequency, Urgency,  Nocturia 2-3 times and  occassional episodes of gross hematuria. She states she has a strong stream most of the times but feels like she does not empty as she only \"voids a little each time\". She says most times she is able to void,  but she either goes too much, or she is unable to void has dysuria.    She also reports recurrent UTI's for which she has tried multiple antibiotics(Cipro, Cefdinir, Bactrim, Macrobid, and amoxicillin recently). She is unsure of any culture results ( no positive culture seen on file). Her urine dipstick today is completely negative for any infection, it is negative for microscopic hematuria.  She is currently on antibiotic amoxicillin 875 mg for her current UTI.  Her PVR is >304    Thirdly, Patient also complaints of lower quadrant abdominal pain. She has pelvic discomfort and pressure. She has flank pain consistent to a\" kidney stone pain\", She denies CVA tenderness. She has nausea most of the times, denies vomitting, fevers or chills. She has regular bowel movements now, but had issues with constipation in the past.     She is , with a significant medical history of hepatitis C, PID, kidney stones. The rest of  her medical history is listed below    Past Medical History: "        Past Medical History:   Diagnosis Date   • Anemia    • Anxiety    • Depression    • Hepatitis C    • Hypertension    • Kidney anomaly, congenital    • Kidney stone    • PID (pelvic inflammatory disease)    • Urinary tract infection      The following portions of the patient's history were reviewed and updated as appropriate: allergies, current medications, past family history, past medical history, past social history, past surgical history and problem list.    Current Meds:     Current Outpatient Medications   Medication Sig Dispense Refill   • hydrOXYzine (ATARAX) 10 MG tablet Take 1 tablet by mouth Every 8 (Eight) Hours As Needed for Itching. 10 tablet 0   • lisinopril (PRINIVIL,ZESTRIL) 20 MG tablet 30 mg.     • sertraline (ZOLOFT) 50 MG tablet 50 mg.     • nitrofurantoin, macrocrystal-monohydrate, (Macrobid) 100 MG capsule Take 1 capsule by mouth Daily. 56 capsule 3   • phenazopyridine (Pyridium) 200 MG tablet Take 1 tablet by mouth 3 (Three) Times a Day As Needed for Bladder Spasms. 20 tablet 0     No current facility-administered medications for this visit.         Allergies:      No Known Allergies     Past Surgical History:     Past Surgical History:   Procedure Laterality Date   •  SECTION     •  SECTION N/A 2020    Procedure:  SECTION REPEAT;  Surgeon: Sonya Barton DO;  Location: [facility] LABOR DELIVERY;  Service: Obstetrics/Gynecology;  Laterality: N/A;         Social History:     Social History     Socioeconomic History   • Marital status:      Spouse name: Not on file   • Number of children: Not on file   • Years of education: Not on file   • Highest education level: Not on file   Tobacco Use   • Smoking status: Current Every Day Smoker     Packs/day: 2.00   • Smokeless tobacco: Never Used   Substance and Sexual Activity   • Alcohol use: No   • Drug use: Yes     Types: Methamphetamines, Oxycodone, Amphetamines, Marijuana, Benzodiazepines      Comment: suboxone, neurontin   • Sexual activity: Yes       Family History:     Family History   Problem Relation Age of Onset   • Alcohol abuse Father    • Depression Father    • Hypertension Father    • Stroke Father    • COPD Mother    • Diabetes Mother    • Hypertension Mother    • Alcohol abuse Paternal Grandfather    • Kidney disease Paternal Grandfather    • Stroke Paternal Grandfather    • Kidney cancer Paternal Grandmother    • Coronary artery disease Paternal Grandmother    • Hypertension Paternal Grandmother    • Asthma Maternal Grandmother    • Kidney cancer Maternal Grandmother    • Depression Maternal Grandmother    • Diabetes Maternal Grandmother    • Hypertension Maternal Grandmother    • Kidney disease Maternal Grandmother    • Alcohol abuse Maternal Grandfather    • Lung cancer Maternal Grandfather        Review of Systems:     Review of Systems   Constitutional: Positive for fatigue. Negative for activity change, chills and fever.   HENT: Negative for congestion and sinus pressure.    Eyes: Negative for blurred vision, pain and redness.   Respiratory: Negative for chest tightness and shortness of breath.    Cardiovascular: Negative for chest pain.   Gastrointestinal: Positive for constipation, diarrhea, nausea and vomiting. Negative for abdominal pain.   Endocrine: Negative for heat intolerance.   Genitourinary: Positive for difficulty urinating, dysuria, frequency, hematuria, pelvic pain, urgency and vaginal pain. Negative for dyspareunia, flank pain, genital sores, pelvic pressure, urinary incontinence and vaginal discharge.   Musculoskeletal: Negative for back pain.   Skin: Negative for rash.   Allergic/Immunologic: Negative for food allergies.   Neurological: Positive for headache. Negative for dizziness and confusion.   Hematological: Does not bruise/bleed easily.   Psychiatric/Behavioral: Negative for behavioral problems and decreased concentration. The patient is nervous/anxious.          Physical Exam:     Physical Exam   Constitutional: She is oriented to person, place, and time. She appears well-developed and well-nourished. She appears distressed.   HENT:   Head: Normocephalic and atraumatic.   Eyes: Pupils are equal, round, and reactive to light. EOM are normal.   Neck: Normal range of motion. No tracheal deviation present. No thyromegaly present.   Cardiovascular: Normal rate and regular rhythm.   No murmur heard.  Pulmonary/Chest: Effort normal and breath sounds normal. No stridor. No respiratory distress. She has no wheezes.   Abdominal: Soft. Bowel sounds are normal. There is tenderness.   Genitourinary: Vagina normal. There is no tenderness on the right labia. There is no tenderness on the left labia. No vaginal discharge found.   Genitourinary Comments: Soft nontender abdomen with no organomegaly, rigidity, guarding or tenderness.  Normal vaginal orifice.  She has  no leakage with Valsalva.  No significant perineal body abnormalities and a normal external anus.      Musculoskeletal: Normal range of motion. She exhibits tenderness. She exhibits no edema or deformity.   Neurological: She is alert and oriented to person, place, and time. No cranial nerve deficit or sensory deficit. Coordination normal.   Skin: Skin is warm and dry. Capillary refill takes less than 2 seconds. No rash noted. No erythema. No pallor.   Psychiatric: She has a normal mood and affect. Her behavior is normal. Judgment and thought content normal.         Procedure:       Assessment/Plan:                            ASSESSMENT  Recurrent urinary tract infections /Hematuria /Abdominal pain: Very pleasant and energetic 34 year old female evaluated today for Recurrent UTI's.  Patient is currently on amoxicillin 875 mg by her primary care, urine dipstick today is completely negative for any infection, it is negative for microscopic/gross hematuria.  She has  a PVR greater than 304 cc.    Although still symptomatic, she  reports doing relatively better on her antibiotic, and would like to continue. We discussed the risk factors for recurrent infections being intercourse in younger patients and atrophic changes in older patients.  We discussed the symptoms that are found including pain, pressure, burning, frequency, urgency suprapubic pain and painful intercourse. I discussed upper tract symptoms including fevers, chills, and indicated the workup would be much more aggressive if the patient were to present with recurrent infections in the face of upper tract symptomatology such as fever.    Discussed Gross/ Microscopic Hematuria with patient. She has been symptomatic.last episode was 3 weeks ago. Pt educated on possible causes such as infection in the bladder, kidney, or bladder discomfort, trauma.  Different kinds of cancers, vigorous exercise, viral illness, such as hepatitis a virus that causes liver disease and inflammation of the liver and sexual activity.                                                 PLAN    Will Send her urine out for culture, I will call her if resistant to current Antibiotics.    We discussed she continue her antibiotic as directed until finidhed. She should increase her p.o. fluid intake to at least 1 to 2 L daily and avoid bladder irritants such as caffeine products, spicy foods, and citrusy foods.    We discussed starting antibiotic prophylaxis thereafter with TRIMETHORPRIM 100 mg nightly. I recommend concomitant probiotics with treatment with antibiotics to protect the rectal reservoir including over-the-counter yogurt preparations to senthil oral pills containing the appropriate probiotics. Patient reports the diligent use of Probiotics.    We discussed upper tract  tract investigation, patient has been scheduled for CT scan abdomen and pelvis due to episodes of gross hematuria, lower quadrant abdominal discomfort, recurrent UTIs.    We discussed a lower tract investigation, patient has been scheduled  for cystoscopy with Dr. Quiroz on 09/30/2020     Will see her back in 6 weeks for Follow up in clinic and review her CT scan results, plan to recheck her urinalysis     Patient is agreeable to plan of care    Patient reports that she is not currently experiencing any symptoms of urinary incontinence.    Patient's Body mass index is 28.9 kg/m². BMI is above normal parameters. Recommendations include: educational material, exercise counseling and nutrition counseling.    Smoking Cessation Counseling:  Current every day smoker. less than 3 minutes spent counseling. Will try to cut down.  I advised patient to quit tobacco use and offered support.  I provided patient with tobacco cessation educational material printed in the patient's After Visit Summary.     Counseling was given to patient for the following topics diagnostic results including: Recurrent UTIs, abdominal pain, instructions for management as follows: Continue antibiotics as prescribed, warm compresses to flank area, increase p.o. fluid intake, alternate NSAIDs/Tylenol for pain and discomfort., risk factor reductions including: Smoking cessation, bladder irritants such as caffeine products, soda drinks, coffee, tea, citrusy foods, spicy foods. and impressions as follows: CT scan abdomen and pelvis, cystoscopy 09/30/2020 with Dr. Quiroz. The interim medical history and current results were reviewed.  A treatment plan with follow-up was made for Recurrent UTI [N39.0].          This document has been electronically signed by Griselda Cheng-Akwa, APRN September 10, 2020 16:09

## 2020-09-09 LAB — BACTERIA SPEC AEROBE CULT: NO GROWTH

## 2020-09-10 NOTE — PATIENT INSTRUCTIONS
Urinary Tract Infection, Adult  A urinary tract infection (UTI) is an infection of any part of the urinary tract. The urinary tract includes:  · The kidneys.  · The ureters.  · The bladder.  · The urethra.  These organs make, store, and get rid of pee (urine) in the body.  What are the causes?  This is caused by germs (bacteria) in your genital area. These germs grow and cause swelling (inflammation) of your urinary tract.  What increases the risk?  You are more likely to develop this condition if:  · You have a small, thin tube (catheter) to drain pee.  · You cannot control when you pee or poop (incontinence).  · You are female, and:  ? You use these methods to prevent pregnancy:  ? A medicine that kills sperm (spermicide).  ? A device that blocks sperm (diaphragm).  ? You have low levels of a female hormone (estrogen).  ? You are pregnant.  · You have genes that add to your risk.  · You are sexually active.  · You take antibiotic medicines.  · You have trouble peeing because of:  ? A prostate that is bigger than normal, if you are male.  ? A blockage in the part of your body that drains pee from the bladder (urethra).  ? A kidney stone.  ? A nerve condition that affects your bladder (neurogenic bladder).  ? Not getting enough to drink.  ? Not peeing often enough.  · You have other conditions, such as:  ? Diabetes.  ? A weak disease-fighting system (immune system).  ? Sickle cell disease.  ? Gout.  ? Injury of the spine.  What are the signs or symptoms?  Symptoms of this condition include:  · Needing to pee right away (urgently).  · Peeing often.  · Peeing small amounts often.  · Pain or burning when peeing.  · Blood in the pee.  · Pee that smells bad or not like normal.  · Trouble peeing.  · Pee that is cloudy.  · Fluid coming from the vagina, if you are female.  · Pain in the belly or lower back.  Other symptoms include:  · Throwing up (vomiting).  · No urge to eat.  · Feeling mixed up (confused).  · Being tired  and grouchy (irritable).  · A fever.  · Watery poop (diarrhea).  How is this treated?  This condition may be treated with:  · Antibiotic medicine.  · Other medicines.  · Drinking enough water.  Follow these instructions at home:    Medicines  · Take over-the-counter and prescription medicines only as told by your doctor.  · If you were prescribed an antibiotic medicine, take it as told by your doctor. Do not stop taking it even if you start to feel better.  General instructions  · Make sure you:  ? Pee until your bladder is empty.  ? Do not hold pee for a long time.  ? Empty your bladder after sex.  ? Wipe from front to back after pooping if you are a female. Use each tissue one time when you wipe.  · Drink enough fluid to keep your pee pale yellow.  · Keep all follow-up visits as told by your doctor. This is important.  Contact a doctor if:  · You do not get better after 1-2 days.  · Your symptoms go away and then come back.  Get help right away if:  · You have very bad back pain.  · You have very bad pain in your lower belly.  · You have a fever.  · You are sick to your stomach (nauseous).  · You are throwing up.  Summary  · A urinary tract infection (UTI) is an infection of any part of the urinary tract.  · This condition is caused by germs in your genital area.  · There are many risk factors for a UTI. These include having a small, thin tube to drain pee and not being able to control when you pee or poop.  · Treatment includes antibiotic medicines for germs.  · Drink enough fluid to keep your pee pale yellow.  This information is not intended to replace advice given to you by your health care provider. Make sure you discuss any questions you have with your health care provider.  Document Released: 06/05/2009 Document Revised: 12/05/2019 Document Reviewed: 06/27/2019  [employer] Patient Education © 2020 [employer]  Acute Urinary Retention, Female    Acute urinary retention means that you cannot pee (urinate) at  all, or that you pee too little and your bladder is not emptied completely. If it is not treated, it can lead to kidney damage or other serious problems.  Follow these instructions at home:  · Take over-the-counter and prescription medicines only as told by your doctor. Ask your doctor what medicines you should stay away from. Do not take any medicine unless your doctor says it is okay to do so.  · If you were sent home with a tube that drains pee from the bladder (catheter), take care of it as told by your doctor.  · Drink enough fluid to keep your pee clear or pale yellow.  · If you were given an antibiotic, take it as told by your doctor. Do not stop taking the antibiotic even if you start to feel better.  · Do not use any products that contain nicotine or tobacco, such as cigarettes and e-cigarettes. If you need help quitting, ask your doctor.  · Watch for changes in your symptoms. Tell your doctor about them.  · If told, keep track of any changes in your blood pressure at home. Tell your doctor about them.  · Keep all follow-up visits as told by your doctor. This is important.  Contact a doctor if:  · You have spasms or you leak pee when you have spasms.  Get help right away if:  · You have chills or a fever.  · You have blood in your pee.  · You have a tube that drains the bladder and:  ? The tube stops draining pee.  ? The tube falls out.  Summary  · Acute urinary retention means that you cannot pee at all, or that you pee too little and your bladder is not emptied completely. If it is not treated, it can result in kidney damage or other serious problems.  · If you were sent home with a tube that drains pee from the bladder, take care of it as told by your doctor.  · Pay attention to any changes in your symptoms. Tell your doctor about them.  This information is not intended to replace advice given to you by your health care provider. Make sure you discuss any questions you have with your health care  provider.  Document Released: 06/05/2009 Document Revised: 11/30/2018 Document Reviewed: 01/19/2018  [employer] Patient Education © 2020 [employer]  Flank Pain, Adult  Flank pain is pain in your side. The flank is the area of your side between your upper belly (abdomen) and your back. The pain may occur over a short time (acute), or it may be long-term or come back often (chronic). It may be mild or very bad. Pain in this area can be caused by many different things.  Follow these instructions at home:    · Drink enough fluid to keep your pee (urine) clear or pale yellow.  · Rest as told by your doctor.  · Take over-the-counter and prescription medicines only as told by your doctor.  · Keep a journal to keep track of:  ? What has caused your flank pain.  ? What has made it feel better.  · Keep all follow-up visits as told by your doctor. This is important.  Contact a doctor if:  · Medicine does not help your pain.  · You have new symptoms.  · Your pain gets worse.  · You have a fever.  · Your symptoms last longer than 2-3 days.  · You have trouble peeing.  · You are peeing more often than normal.  Get help right away if:  · You have trouble breathing.  · You are short of breath.  · Your belly hurts, or it is swollen or red.  · You feel sick to your stomach (nauseous).  · You throw up (vomit).  · You feel like you will pass out, or you do pass out (faint).  · You have blood in your pee.  Summary  · Flank pain is pain in your side. The flank is the area of your side between your upper belly (abdomen) and your back.  · Flank pain may occur over a short time (acute), or it may be long-term or come back often (chronic). It may be mild or very bad.  · Pain in this area can be caused by many different things.  · Contact your doctor if your symptoms get worse or they last longer than 2-3 days.  This information is not intended to replace advice given to you by your health care provider. Make sure you discuss any questions  you have with your health care provider.  Document Released: 09/26/2009 Document Revised: 11/30/2018 Document Reviewed: 04/09/2018  [employer] Patient Education © 2020 [employer]

## 2020-09-16 ENCOUNTER — HOSPITAL ENCOUNTER (OUTPATIENT)
Dept: CT IMAGING | Facility: HOSPITAL | Age: 35
Discharge: HOME | End: 2020-09-16
Admitting: NURSE PRACTITIONER

## 2020-09-16 DIAGNOSIS — R31.0 GROSS HEMATURIA: ICD-10-CM

## 2020-09-16 PROCEDURE — 74178 CT ABD&PLV WO CNTR FLWD CNTR: CPT | Performed by: RADIOLOGY

## 2020-09-16 PROCEDURE — 0 IOVERSOL 68 % SOLUTION: Performed by: NURSE PRACTITIONER

## 2020-09-16 PROCEDURE — 74178 CT ABD&PLV WO CNTR FLWD CNTR: CPT

## 2020-09-16 RX ADMIN — IOVERSOL 80 ML: 678 INJECTION INTRA-ARTERIAL; INTRAVENOUS at 14:42

## 2020-09-21 ENCOUNTER — OFFICE VISIT (OUTPATIENT)
Dept: UROLOGY | Facility: CLINIC | Age: 35
End: 2020-09-21

## 2020-09-21 VITALS — HEIGHT: 62 IN | BODY MASS INDEX: 28.9 KG/M2

## 2020-09-21 DIAGNOSIS — N39.0 URINARY TRACT INFECTION WITHOUT HEMATURIA, SITE UNSPECIFIED: ICD-10-CM

## 2020-09-21 DIAGNOSIS — R30.0 DYSURIA: Primary | ICD-10-CM

## 2020-09-21 DIAGNOSIS — R35.0 FREQUENCY OF MICTURITION: ICD-10-CM

## 2020-09-21 DIAGNOSIS — N39.0 RECURRENT UTI: ICD-10-CM

## 2020-09-21 DIAGNOSIS — R35.0 FREQUENCY OF URINATION: ICD-10-CM

## 2020-09-21 LAB
BILIRUB BLD-MCNC: NEGATIVE MG/DL
CLARITY, POC: CLEAR
COLOR UR: YELLOW
GLUCOSE UR STRIP-MCNC: NEGATIVE MG/DL
KETONES UR QL: NEGATIVE
LEUKOCYTE EST, POC: NEGATIVE
NITRITE UR-MCNC: NEGATIVE MG/ML
PH UR: 5 [PH] (ref 5–8)
PROT UR STRIP-MCNC: NEGATIVE MG/DL
RBC # UR STRIP: ABNORMAL /UL
SP GR UR: 1.03 (ref 1–1.03)
UROBILINOGEN UR QL: NORMAL

## 2020-09-21 PROCEDURE — 87147 CULTURE TYPE IMMUNOLOGIC: CPT | Performed by: NURSE PRACTITIONER

## 2020-09-21 PROCEDURE — 51798 US URINE CAPACITY MEASURE: CPT | Performed by: NURSE PRACTITIONER

## 2020-09-21 PROCEDURE — 99214 OFFICE O/P EST MOD 30 MIN: CPT | Performed by: NURSE PRACTITIONER

## 2020-09-21 PROCEDURE — 87086 URINE CULTURE/COLONY COUNT: CPT | Performed by: NURSE PRACTITIONER

## 2020-09-21 RX ORDER — ETODOLAC 400 MG/1
400 TABLET, FILM COATED ORAL 2 TIMES DAILY
Qty: 20 TABLET | Refills: 0 | Status: SHIPPED | OUTPATIENT
Start: 2020-09-21 | End: 2020-09-24 | Stop reason: SDUPTHER

## 2020-09-21 RX ORDER — PHENAZOPYRIDINE HYDROCHLORIDE 200 MG/1
200 TABLET, FILM COATED ORAL 3 TIMES DAILY PRN
Qty: 20 TABLET | Refills: 0 | Status: SHIPPED | OUTPATIENT
Start: 2020-09-21 | End: 2020-09-24 | Stop reason: SDUPTHER

## 2020-09-21 NOTE — PROGRESS NOTES
"Chief Complaint:          Chief Complaint   Patient presents with   • Recurrent Utis /Gross Hematuria     Follow up  /Review CT Scan Results       HPI:   34 y.o. female.  Very pleasant female with complex history returns for follow-up today, and to review her CT scan results which are negative for any etiology/causes of her gross hematuria. It however confirms a horseshoe kidney, with uterine engorgement.  She is 3 months postpartum.    She reports improvement in her urinary symptoms since starting suppressive therapy with trimethoprim 100 mg daily.  Nevertheless, she still reports significant dysuria with burning on urination.  Lower quadrant abdominal pain. She has pelvic discomfort and pressure. She has flank pain consistent to a\" kidney stone pain\", She denies CVA tenderness. She has nausea most of the times, denies vomitting, fevers or chills. She has regular bowel movements now, but had issues with constipation in the past.     Her urine dipstick today showed 1+ leukocyte Estrace, negative nitrites, 3+ microscopic hematuria. She is currently on her menses.  Her PVR today is 56 cc.Patient reports her symptoms have been ongoing \" for a While\", starting since childhood when she was diagnosed with \"horseshoe kidney\".  She reports she has dealt with kidney/bladder infections all her life.       She is , with a significant medical history of hepatitis C, PID, kidney stones. The rest of  her medical history is listed below      Past Medical History:        Past Medical History:   Diagnosis Date   • Anemia    • Anxiety    • Depression    • Hepatitis C    • Hypertension    • Kidney anomaly, congenital    • Kidney stone    • PID (pelvic inflammatory disease)    • Urinary tract infection      The following portions of the patient's history were reviewed and updated as appropriate: allergies, current medications, past family history, past medical history, past social history, past surgical history and problem " list.    Current Meds:     Current Outpatient Medications   Medication Sig Dispense Refill   • hydrOXYzine (ATARAX) 10 MG tablet Take 1 tablet by mouth Every 8 (Eight) Hours As Needed for Itching. 10 tablet 0   • lisinopril (PRINIVIL,ZESTRIL) 20 MG tablet 30 mg.     • nitrofurantoin, macrocrystal-monohydrate, (Macrobid) 100 MG capsule Take 1 capsule by mouth Daily. 56 capsule 3   • phenazopyridine (Pyridium) 200 MG tablet Take 1 tablet by mouth 3 (Three) Times a Day As Needed for Bladder Spasms. 20 tablet 0   • sertraline (ZOLOFT) 50 MG tablet 50 mg.     • etodolac (LODINE) 400 MG tablet Take 1 tablet by mouth 2 (Two) Times a Day. 20 tablet 0     No current facility-administered medications for this visit.         Allergies:      No Known Allergies     Past Surgical History:     Past Surgical History:   Procedure Laterality Date   •  SECTION     •  SECTION N/A 2020    Procedure:  SECTION REPEAT;  Surgeon: Sonya Barton DO;  Location: [facility] LABOR DELIVERY;  Service: Obstetrics/Gynecology;  Laterality: N/A;         Social History:     Social History     Socioeconomic History   • Marital status: Legally      Spouse name: Not on file   • Number of children: Not on file   • Years of education: Not on file   • Highest education level: Not on file   Tobacco Use   • Smoking status: Current Every Day Smoker     Packs/day: 2.00   • Smokeless tobacco: Never Used   Substance and Sexual Activity   • Alcohol use: No   • Drug use: Yes     Types: Methamphetamines, Oxycodone, Amphetamines, Marijuana, Benzodiazepines     Comment: suboxone, neurontin   • Sexual activity: Yes       Family History:     Family History   Problem Relation Age of Onset   • Alcohol abuse Father    • Depression Father    • Hypertension Father    • Stroke Father    • COPD Mother    • Diabetes Mother    • Hypertension Mother    • Alcohol abuse Paternal Grandfather    • Kidney disease Paternal Grandfather    •  Stroke Paternal Grandfather    • Kidney cancer Paternal Grandmother    • Coronary artery disease Paternal Grandmother    • Hypertension Paternal Grandmother    • Asthma Maternal Grandmother    • Kidney cancer Maternal Grandmother    • Depression Maternal Grandmother    • Diabetes Maternal Grandmother    • Hypertension Maternal Grandmother    • Kidney disease Maternal Grandmother    • Alcohol abuse Maternal Grandfather    • Lung cancer Maternal Grandfather        Review of Systems:     Review of Systems   Constitutional: Positive for activity change, appetite change and fatigue. Negative for chills and fever.   HENT: Negative for congestion.    Eyes: Negative for blurred vision.   Gastrointestinal: Positive for abdominal pain and nausea. Negative for vomiting.   Genitourinary: Positive for difficulty urinating, dysuria, flank pain, frequency, hematuria, pelvic pain, pelvic pressure, urgency and vaginal pain. Negative for dyspareunia, genital sores, urinary incontinence and vaginal discharge.   Musculoskeletal: Positive for back pain.   Neurological: Negative for dizziness, headache and confusion.   Psychiatric/Behavioral: Positive for sleep disturbance and stress. Negative for behavioral problems and decreased concentration.        Physical Exam:     Physical Exam  Constitutional:       General: She is in acute distress.      Appearance: She is well-developed.   HENT:      Head: Normocephalic and atraumatic.   Eyes:      Pupils: Pupils are equal, round, and reactive to light.   Neck:      Musculoskeletal: Normal range of motion. Muscular tenderness present.      Thyroid: No thyromegaly.      Trachea: No tracheal deviation.   Cardiovascular:      Rate and Rhythm: Normal rate and regular rhythm.      Heart sounds: No murmur.   Pulmonary:      Effort: Pulmonary effort is normal. No respiratory distress.      Breath sounds: Normal breath sounds. No stridor. No wheezing.   Abdominal:      General: Bowel sounds are normal.       Palpations: Abdomen is soft.      Tenderness: There is abdominal tenderness.   Genitourinary:     Labia:         Right: No tenderness.         Left: No tenderness.       Vagina: Normal. No vaginal discharge.   Musculoskeletal: Normal range of motion.         General: No deformity.   Skin:     General: Skin is warm and dry.      Capillary Refill: Capillary refill takes less than 2 seconds.      Coloration: Skin is not pale.      Findings: No erythema or rash.   Neurological:      Mental Status: She is alert and oriented to person, place, and time.      Cranial Nerves: No cranial nerve deficit.      Sensory: No sensory deficit.      Motor: Weakness present.      Coordination: Coordination normal.   Psychiatric:         Behavior: Behavior normal.         Thought Content: Thought content normal.         Judgment: Judgment normal.         Procedure:       Assessment/Plan:          ASSESSMENT  Recurrent urinary tract infections / GROSS/MCROSCOPIC Hematuria /Abdominal pain: Very pleasant and energetic 34 year old female into clinic today for follow-up.  She reports urinary symptoms of frequency, dysuria, pelvic pain, and discomfort.  She reports significant lower quadrant abdominal pain. Her urine dipstick today showed 1+ leukocyte Estrace, negative nitrites, 3+ microscopic hematuria. She is currently on her menses.  Her PVR today is 56 cc    However, we both reviewed/discussed her CT scan results today which mainly validates her horseshoe kidney.  There is also evidence of uterine engorgement seen on the CT scan.       Although still symptomatic, she reports doing relatively better on her antibiotic suppressive therapy, and would like to continue. We discussed the risk factors for recurrent infections being intercourse in younger patients and atrophic changes in older patients.  We discussed the symptoms that are found including pain, pressure, burning, frequency, urgency suprapubic pain and painful intercourse. I  discussed upper tract symptoms including fevers, chills, and indicated the workup would be much more aggressive if the patient were to present with recurrent infections in the face of upper tract symptomatology such as fever.     Discussed Gross/ Microscopic Hematuria with patient. She has been symptomatic.last episode was 5 weeks ago. Pt educated on possible causes such as infection in the bladder, kidney, or bladder discomfort, trauma.  Different kinds of cancers, vigorous exercise, viral illness, such as hepatitis a virus that causes liver disease and inflammation of the liver and sexual activity.                                                  PLAN     Again, Will Send her urine out for culture, I will call her if resistant to current Antibiotics.     We discussed  she continue antibiotic prophylaxis with TRIMETHORPRIM 100 mg nightly. I recommend concomitant probiotics with treatment with antibiotics to protect the rectal reservoir including over-the-counter yogurt preparations to senthil oral pills containing the appropriate probiotics. Patient reports the diligent use of Probiotics.    Etodolac 400 mg PRN pelvic pain and discomfort    Continue Pyridium 200 mg PRN dysuria    Discussed IC, lidocaine 5 cc instilled In Urethra for comfort     We discussed a lower tract investigation, patient has been scheduled for cystoscopy with Dr. Quiroz on 09/30/2020 , TO complete work-up for GROSS HEMATURIA, urinary retention.        Patient is agreeable to plan of care     Patient reports that she is not currently experiencing any symptoms of urinary incontinence.     Patient's Body mass index is 28.9 kg/m². BMI is above normal parameters. Recommendations include: educational material, exercise counseling and nutrition counseling.     Smoking Cessation Counseling:  Current every day smoker. less than 3 minutes spent counseling. Will try to cut down.  I advised patient to quit tobacco use and offered support.  I provided  patient with tobacco cessation educational material printed in the patient's After Visit Summary.      Counseling was given to patient for the following topics diagnostic results including: Recurrent UTIs, abdominal pain, instructions for management as follows: Continue antibiotics as prescribed, warm compresses to flank area, increase p.o. fluid intake, alternate NSAIDs/Tylenol for pain and discomfort., risk factor reductions including: Smoking cessation, bladder irritants such as caffeine products, soda drinks, coffee, tea, citrusy foods, spicy foods. and impressions as follows: CT scan abdomen and pelvis, cystoscopy 09/30/2020 with Dr. Quiroz. The interim medical history and current results were reviewed.  A treatment plan with follow-up was made for Recurrent UTI [N39.0].               This document has been electronically signed by Griselda Cheng-Akwa, APRN September 21, 2020 19:41 EDT

## 2020-09-22 LAB — BACTERIA SPEC AEROBE CULT: ABNORMAL

## 2020-09-24 DIAGNOSIS — N39.0 RECURRENT UTI: ICD-10-CM

## 2020-09-24 DIAGNOSIS — R30.0 DYSURIA: ICD-10-CM

## 2020-09-24 DIAGNOSIS — R35.0 FREQUENCY OF MICTURITION: ICD-10-CM

## 2020-09-24 RX ORDER — ETODOLAC 400 MG/1
400 TABLET, FILM COATED ORAL 2 TIMES DAILY
Qty: 20 TABLET | Refills: 0 | Status: SHIPPED | OUTPATIENT
Start: 2020-09-24 | End: 2021-06-05

## 2020-09-24 RX ORDER — PHENAZOPYRIDINE HYDROCHLORIDE 200 MG/1
200 TABLET, FILM COATED ORAL 3 TIMES DAILY PRN
Qty: 20 TABLET | Refills: 0 | Status: SHIPPED | OUTPATIENT
Start: 2020-09-24 | End: 2021-06-05

## 2020-09-24 NOTE — TELEPHONE ENCOUNTER
Pt called checking on her med and they were sent to the wrong pharmacy and I reordered them for the pt ant the correct pharmacy.

## 2020-09-30 ENCOUNTER — PROCEDURE VISIT (OUTPATIENT)
Dept: UROLOGY | Facility: CLINIC | Age: 35
End: 2020-09-30

## 2020-09-30 VITALS — WEIGHT: 168 LBS | BODY MASS INDEX: 30.91 KG/M2 | HEIGHT: 62 IN | TEMPERATURE: 98 F

## 2020-09-30 DIAGNOSIS — R35.0 FREQUENCY OF MICTURITION: Primary | ICD-10-CM

## 2020-09-30 DIAGNOSIS — N39.0 RECURRENT UTI: ICD-10-CM

## 2020-09-30 LAB
BILIRUB BLD-MCNC: NEGATIVE MG/DL
CLARITY, POC: CLEAR
COLOR UR: YELLOW
GLUCOSE UR STRIP-MCNC: NEGATIVE MG/DL
KETONES UR QL: NEGATIVE
LEUKOCYTE EST, POC: NEGATIVE
NITRITE UR-MCNC: NEGATIVE MG/ML
PH UR: 5 [PH] (ref 5–8)
PROT UR STRIP-MCNC: NEGATIVE MG/DL
RBC # UR STRIP: NEGATIVE /UL
SP GR UR: 1.02 (ref 1–1.03)
UROBILINOGEN UR QL: NORMAL

## 2020-09-30 PROCEDURE — 81003 URINALYSIS AUTO W/O SCOPE: CPT | Performed by: UROLOGY

## 2020-09-30 PROCEDURE — 52000 CYSTOURETHROSCOPY: CPT | Performed by: UROLOGY

## 2020-09-30 PROCEDURE — 87086 URINE CULTURE/COLONY COUNT: CPT | Performed by: UROLOGY

## 2020-09-30 RX ORDER — POLYVINYL ALCOHOL, POVIDONE .5; .6 G/100ML; G/100ML
LIQUID OPHTHALMIC AS NEEDED
COMMUNITY
Start: 2020-09-18 | End: 2021-06-05

## 2020-09-30 RX ORDER — LISINOPRIL 30 MG/1
1 TABLET ORAL DAILY
COMMUNITY
Start: 2020-08-31 | End: 2021-11-10 | Stop reason: DRUGHIGH

## 2020-09-30 RX ORDER — LORATADINE 10 MG/1
1 TABLET ORAL DAILY
COMMUNITY
Start: 2020-09-18 | End: 2021-06-05

## 2020-09-30 RX ORDER — DOXYCYCLINE 100 MG/1
100 CAPSULE ORAL 2 TIMES DAILY
COMMUNITY
Start: 2020-09-16 | End: 2021-06-05

## 2020-09-30 NOTE — PROGRESS NOTES
Chief Complaint:       Gross hematuria        HPI:       Procedure:      Procedure: Cystoscopy Female    Indication: Gross hematuria in a patient who had a CT scan that showed a horseshoe kidney without masses or hydronephrosis or stones    Urinalysis Performed Today:  Negative for Infection    Informed Consent Obtained    Sterile prep performed in usual fashion    6 cc of topical lidocaine inserted urethrally    Flexible cystoscope inserted and bladder examined    Findings: normal: Urethra without lesions, Bladder mucosa without tumors or lesions, No stones seen, ureteral orifices are in orthotopic position and size.    Additional Procedure with Cystoscopy: none      Discussion:      We will have the patient return to see us in a year    Assessment:     Encounter Diagnoses   Name Primary?   • Frequency of micturition Yes   • Recurrent UTI      Orders Placed This Encounter   Procedures   • Urine Culture - Urine, Urine, Random Void   • POC Urinalysis Dipstick, Automated     NOW@

## 2020-10-01 LAB — BACTERIA SPEC AEROBE CULT: NO GROWTH

## 2021-06-05 ENCOUNTER — APPOINTMENT (OUTPATIENT)
Dept: GENERAL RADIOLOGY | Facility: HOSPITAL | Age: 36
End: 2021-06-05

## 2021-06-05 ENCOUNTER — HOSPITAL ENCOUNTER (INPATIENT)
Facility: HOSPITAL | Age: 36
LOS: 3 days | Discharge: HOME | End: 2021-06-08
Attending: EMERGENCY MEDICINE | Admitting: INTERNAL MEDICINE

## 2021-06-05 ENCOUNTER — APPOINTMENT (OUTPATIENT)
Dept: CT IMAGING | Facility: HOSPITAL | Age: 36
End: 2021-06-05

## 2021-06-05 DIAGNOSIS — N12 PYELONEPHRITIS: Primary | ICD-10-CM

## 2021-06-05 LAB
ALBUMIN SERPL-MCNC: 4.36 G/DL (ref 3.5–5.2)
ALBUMIN/GLOB SERPL: 1.1 G/DL
ALP SERPL-CCNC: 101 U/L (ref 39–117)
ALT SERPL W P-5'-P-CCNC: 14 U/L (ref 1–33)
ANION GAP SERPL CALCULATED.3IONS-SCNC: 16.2 MMOL/L (ref 5–15)
AST SERPL-CCNC: 16 U/L (ref 1–32)
B-HCG UR QL: NEGATIVE
BACTERIA UR QL AUTO: ABNORMAL /HPF
BASOPHILS # BLD AUTO: 0.06 10*3/MM3 (ref 0–0.2)
BASOPHILS NFR BLD AUTO: 0.3 % (ref 0–1.5)
BILIRUB SERPL-MCNC: 0.5 MG/DL (ref 0–1.2)
BILIRUB UR QL STRIP: NEGATIVE
BUN SERPL-MCNC: 10 MG/DL (ref 6–20)
BUN/CREAT SERPL: 10.2 (ref 7–25)
CALCIUM SPEC-SCNC: 9.8 MG/DL (ref 8.6–10.5)
CHLORIDE SERPL-SCNC: 97 MMOL/L (ref 98–107)
CLARITY UR: ABNORMAL
CO2 SERPL-SCNC: 18.8 MMOL/L (ref 22–29)
COLOR UR: YELLOW
CREAT SERPL-MCNC: 0.98 MG/DL (ref 0.57–1)
CRP SERPL-MCNC: 9.25 MG/DL (ref 0–0.5)
D-LACTATE SERPL-SCNC: 1.9 MMOL/L (ref 0.5–2)
DEPRECATED RDW RBC AUTO: 41 FL (ref 37–54)
EOSINOPHIL # BLD AUTO: 0.01 10*3/MM3 (ref 0–0.4)
EOSINOPHIL NFR BLD AUTO: 0.1 % (ref 0.3–6.2)
ERYTHROCYTE [DISTWIDTH] IN BLOOD BY AUTOMATED COUNT: 13.2 % (ref 12.3–15.4)
ERYTHROCYTE [SEDIMENTATION RATE] IN BLOOD: 17 MM/HR (ref 0–20)
FLUAV RNA RESP QL NAA+PROBE: NOT DETECTED
FLUBV RNA RESP QL NAA+PROBE: NOT DETECTED
GFR SERPL CREATININE-BSD FRML MDRD: 65 ML/MIN/1.73
GLOBULIN UR ELPH-MCNC: 4.1 GM/DL
GLUCOSE SERPL-MCNC: 145 MG/DL (ref 65–99)
GLUCOSE UR STRIP-MCNC: NEGATIVE MG/DL
HBA1C MFR BLD: 5.6 % (ref 4.8–5.6)
HCT VFR BLD AUTO: 44 % (ref 34–46.6)
HGB BLD-MCNC: 14.5 G/DL (ref 12–15.9)
HGB UR QL STRIP.AUTO: ABNORMAL
HOLD SPECIMEN: NORMAL
HOLD SPECIMEN: NORMAL
HYALINE CASTS UR QL AUTO: ABNORMAL /LPF
IMM GRANULOCYTES # BLD AUTO: 0.11 10*3/MM3 (ref 0–0.05)
IMM GRANULOCYTES NFR BLD AUTO: 0.6 % (ref 0–0.5)
KETONES UR QL STRIP: NEGATIVE
LEUKOCYTE ESTERASE UR QL STRIP.AUTO: ABNORMAL
LIPASE SERPL-CCNC: 17 U/L (ref 13–60)
LYMPHOCYTES # BLD AUTO: 1.32 10*3/MM3 (ref 0.7–3.1)
LYMPHOCYTES NFR BLD AUTO: 6.7 % (ref 19.6–45.3)
MCH RBC QN AUTO: 27.7 PG (ref 26.6–33)
MCHC RBC AUTO-ENTMCNC: 33 G/DL (ref 31.5–35.7)
MCV RBC AUTO: 84 FL (ref 79–97)
MONOCYTES # BLD AUTO: 1.2 10*3/MM3 (ref 0.1–0.9)
MONOCYTES NFR BLD AUTO: 6.1 % (ref 5–12)
NEUTROPHILS NFR BLD AUTO: 16.96 10*3/MM3 (ref 1.7–7)
NEUTROPHILS NFR BLD AUTO: 86.2 % (ref 42.7–76)
NITRITE UR QL STRIP: POSITIVE
NRBC BLD AUTO-RTO: 0 /100 WBC (ref 0–0.2)
NT-PROBNP SERPL-MCNC: 350.9 PG/ML (ref 0–450)
PH UR STRIP.AUTO: 5.5 [PH] (ref 5–8)
PLATELET # BLD AUTO: 346 10*3/MM3 (ref 140–450)
PMV BLD AUTO: 10.3 FL (ref 6–12)
POTASSIUM SERPL-SCNC: 3.8 MMOL/L (ref 3.5–5.2)
PROT SERPL-MCNC: 8.5 G/DL (ref 6–8.5)
PROT UR QL STRIP: ABNORMAL
RBC # BLD AUTO: 5.24 10*6/MM3 (ref 3.77–5.28)
RBC # UR: ABNORMAL /HPF
REF LAB TEST METHOD: ABNORMAL
SARS-COV-2 RNA RESP QL NAA+PROBE: NOT DETECTED
SODIUM SERPL-SCNC: 132 MMOL/L (ref 136–145)
SP GR UR STRIP: 1.02 (ref 1–1.03)
SQUAMOUS #/AREA URNS HPF: ABNORMAL /HPF
TROPONIN T SERPL-MCNC: <0.01 NG/ML (ref 0–0.03)
UROBILINOGEN UR QL STRIP: ABNORMAL
WBC # BLD AUTO: 19.66 10*3/MM3 (ref 3.4–10.8)
WBC UR QL AUTO: ABNORMAL /HPF
WHOLE BLOOD HOLD SPECIMEN: NORMAL
WHOLE BLOOD HOLD SPECIMEN: NORMAL

## 2021-06-05 PROCEDURE — 25010000002 CEFTRIAXONE: Performed by: EMERGENCY MEDICINE

## 2021-06-05 PROCEDURE — 87636 SARSCOV2 & INF A&B AMP PRB: CPT | Performed by: EMERGENCY MEDICINE

## 2021-06-05 PROCEDURE — 25010000002 MORPHINE SULFATE (PF) 2 MG/ML SOLUTION: Performed by: INTERNAL MEDICINE

## 2021-06-05 PROCEDURE — 87088 URINE BACTERIA CULTURE: CPT | Performed by: PHYSICIAN ASSISTANT

## 2021-06-05 PROCEDURE — 83605 ASSAY OF LACTIC ACID: CPT | Performed by: PHYSICIAN ASSISTANT

## 2021-06-05 PROCEDURE — 36415 COLL VENOUS BLD VENIPUNCTURE: CPT

## 2021-06-05 PROCEDURE — 93005 ELECTROCARDIOGRAM TRACING: CPT | Performed by: PHYSICIAN ASSISTANT

## 2021-06-05 PROCEDURE — 83036 HEMOGLOBIN GLYCOSYLATED A1C: CPT | Performed by: PHYSICIAN ASSISTANT

## 2021-06-05 PROCEDURE — 80307 DRUG TEST PRSMV CHEM ANLYZR: CPT | Performed by: PHYSICIAN ASSISTANT

## 2021-06-05 PROCEDURE — 25010000002 MORPHINE PER 10 MG: Performed by: EMERGENCY MEDICINE

## 2021-06-05 PROCEDURE — 74177 CT ABD & PELVIS W/CONTRAST: CPT

## 2021-06-05 PROCEDURE — 83690 ASSAY OF LIPASE: CPT | Performed by: PHYSICIAN ASSISTANT

## 2021-06-05 PROCEDURE — 25010000002 CEFTRIAXONE PER 250 MG: Performed by: INTERNAL MEDICINE

## 2021-06-05 PROCEDURE — 25010000002 KETOROLAC TROMETHAMINE PER 15 MG: Performed by: INTERNAL MEDICINE

## 2021-06-05 PROCEDURE — 83880 ASSAY OF NATRIURETIC PEPTIDE: CPT | Performed by: PHYSICIAN ASSISTANT

## 2021-06-05 PROCEDURE — 99284 EMERGENCY DEPT VISIT MOD MDM: CPT

## 2021-06-05 PROCEDURE — 87040 BLOOD CULTURE FOR BACTERIA: CPT | Performed by: PHYSICIAN ASSISTANT

## 2021-06-05 PROCEDURE — 81001 URINALYSIS AUTO W/SCOPE: CPT | Performed by: PHYSICIAN ASSISTANT

## 2021-06-05 PROCEDURE — 86140 C-REACTIVE PROTEIN: CPT | Performed by: PHYSICIAN ASSISTANT

## 2021-06-05 PROCEDURE — 87086 URINE CULTURE/COLONY COUNT: CPT | Performed by: PHYSICIAN ASSISTANT

## 2021-06-05 PROCEDURE — 87186 SC STD MICRODIL/AGAR DIL: CPT | Performed by: PHYSICIAN ASSISTANT

## 2021-06-05 PROCEDURE — 80050 GENERAL HEALTH PANEL: CPT | Performed by: PHYSICIAN ASSISTANT

## 2021-06-05 PROCEDURE — 85652 RBC SED RATE AUTOMATED: CPT | Performed by: PHYSICIAN ASSISTANT

## 2021-06-05 PROCEDURE — 81025 URINE PREGNANCY TEST: CPT | Performed by: PHYSICIAN ASSISTANT

## 2021-06-05 PROCEDURE — 25010000002 KETOROLAC TROMETHAMINE PER 15 MG: Performed by: PHYSICIAN ASSISTANT

## 2021-06-05 PROCEDURE — 71045 X-RAY EXAM CHEST 1 VIEW: CPT

## 2021-06-05 PROCEDURE — 93010 ELECTROCARDIOGRAM REPORT: CPT | Performed by: INTERNAL MEDICINE

## 2021-06-05 PROCEDURE — 25010000002 IOPAMIDOL 61 % SOLUTION: Performed by: EMERGENCY MEDICINE

## 2021-06-05 PROCEDURE — 84484 ASSAY OF TROPONIN QUANT: CPT | Performed by: PHYSICIAN ASSISTANT

## 2021-06-05 PROCEDURE — 25010000002 ONDANSETRON PER 1 MG: Performed by: PHYSICIAN ASSISTANT

## 2021-06-05 PROCEDURE — 99223 1ST HOSP IP/OBS HIGH 75: CPT | Performed by: PHYSICIAN ASSISTANT

## 2021-06-05 RX ORDER — CETIRIZINE HYDROCHLORIDE 10 MG/1
10 TABLET ORAL DAILY
COMMUNITY
End: 2021-10-11 | Stop reason: ALTCHOICE

## 2021-06-05 RX ORDER — HYDROXYZINE HYDROCHLORIDE 25 MG/1
25 TABLET, FILM COATED ORAL 3 TIMES DAILY PRN
Status: DISCONTINUED | OUTPATIENT
Start: 2021-06-05 | End: 2021-06-08 | Stop reason: HOSPADM

## 2021-06-05 RX ORDER — L.ACID,PARA/B.BIFIDUM/S.THERM 8B CELL
1 CAPSULE ORAL DAILY
Status: DISCONTINUED | OUTPATIENT
Start: 2021-06-06 | End: 2021-06-08 | Stop reason: HOSPADM

## 2021-06-05 RX ORDER — SODIUM CHLORIDE 9 MG/ML
125 INJECTION, SOLUTION INTRAVENOUS CONTINUOUS
Status: DISCONTINUED | OUTPATIENT
Start: 2021-06-05 | End: 2021-06-08 | Stop reason: HOSPADM

## 2021-06-05 RX ORDER — KETOROLAC TROMETHAMINE 30 MG/ML
15 INJECTION, SOLUTION INTRAMUSCULAR; INTRAVENOUS ONCE
Status: COMPLETED | OUTPATIENT
Start: 2021-06-05 | End: 2021-06-05

## 2021-06-05 RX ORDER — SODIUM CHLORIDE 0.9 % (FLUSH) 0.9 %
10 SYRINGE (ML) INJECTION EVERY 12 HOURS SCHEDULED
Status: DISCONTINUED | OUTPATIENT
Start: 2021-06-05 | End: 2021-06-08 | Stop reason: HOSPADM

## 2021-06-05 RX ORDER — KETOROLAC TROMETHAMINE 30 MG/ML
30 INJECTION, SOLUTION INTRAMUSCULAR; INTRAVENOUS EVERY 6 HOURS PRN
Status: DISCONTINUED | OUTPATIENT
Start: 2021-06-05 | End: 2021-06-08 | Stop reason: HOSPADM

## 2021-06-05 RX ORDER — MORPHINE SULFATE 2 MG/ML
2 INJECTION, SOLUTION INTRAMUSCULAR; INTRAVENOUS EVERY 4 HOURS PRN
Status: DISCONTINUED | OUTPATIENT
Start: 2021-06-05 | End: 2021-06-08 | Stop reason: HOSPADM

## 2021-06-05 RX ORDER — SODIUM CHLORIDE 0.9 % (FLUSH) 0.9 %
10 SYRINGE (ML) INJECTION AS NEEDED
Status: DISCONTINUED | OUTPATIENT
Start: 2021-06-05 | End: 2021-06-08 | Stop reason: HOSPADM

## 2021-06-05 RX ORDER — ACETAMINOPHEN 325 MG/1
650 TABLET ORAL EVERY 6 HOURS PRN
Status: DISCONTINUED | OUTPATIENT
Start: 2021-06-05 | End: 2021-06-08 | Stop reason: HOSPADM

## 2021-06-05 RX ORDER — PROCHLORPERAZINE EDISYLATE 5 MG/ML
5 INJECTION INTRAMUSCULAR; INTRAVENOUS EVERY 6 HOURS PRN
Status: DISCONTINUED | OUTPATIENT
Start: 2021-06-05 | End: 2021-06-08 | Stop reason: HOSPADM

## 2021-06-05 RX ORDER — ONDANSETRON 2 MG/ML
4 INJECTION INTRAMUSCULAR; INTRAVENOUS ONCE
Status: COMPLETED | OUTPATIENT
Start: 2021-06-05 | End: 2021-06-05

## 2021-06-05 RX ORDER — PANTOPRAZOLE SODIUM 40 MG/1
40 TABLET, DELAYED RELEASE ORAL
Status: DISCONTINUED | OUTPATIENT
Start: 2021-06-06 | End: 2021-06-08 | Stop reason: HOSPADM

## 2021-06-05 RX ORDER — CALCIUM CARBONATE 200(500)MG
2 TABLET,CHEWABLE ORAL 3 TIMES DAILY PRN
Status: DISCONTINUED | OUTPATIENT
Start: 2021-06-05 | End: 2021-06-08 | Stop reason: HOSPADM

## 2021-06-05 RX ORDER — HYDRALAZINE HYDROCHLORIDE 20 MG/ML
10 INJECTION INTRAMUSCULAR; INTRAVENOUS EVERY 6 HOURS PRN
Status: DISCONTINUED | OUTPATIENT
Start: 2021-06-05 | End: 2021-06-08 | Stop reason: HOSPADM

## 2021-06-05 RX ORDER — CHOLECALCIFEROL (VITAMIN D3) 125 MCG
5 CAPSULE ORAL NIGHTLY PRN
Status: DISCONTINUED | OUTPATIENT
Start: 2021-06-05 | End: 2021-06-08 | Stop reason: HOSPADM

## 2021-06-05 RX ADMIN — ONDANSETRON 4 MG: 2 INJECTION INTRAMUSCULAR; INTRAVENOUS at 18:26

## 2021-06-05 RX ADMIN — MORPHINE SULFATE 4 MG: 4 INJECTION, SOLUTION INTRAMUSCULAR; INTRAVENOUS at 20:22

## 2021-06-05 RX ADMIN — KETOROLAC TROMETHAMINE 30 MG: 30 INJECTION, SOLUTION INTRAMUSCULAR; INTRAVENOUS at 23:45

## 2021-06-05 RX ADMIN — IOPAMIDOL 89 ML: 612 INJECTION, SOLUTION INTRAVENOUS at 19:21

## 2021-06-05 RX ADMIN — CEFTRIAXONE 2 G: 2 INJECTION, POWDER, FOR SOLUTION INTRAMUSCULAR; INTRAVENOUS at 23:47

## 2021-06-05 RX ADMIN — SODIUM CHLORIDE 125 ML/HR: 9 INJECTION, SOLUTION INTRAVENOUS at 22:34

## 2021-06-05 RX ADMIN — KETOROLAC TROMETHAMINE 15 MG: 30 INJECTION, SOLUTION INTRAMUSCULAR; INTRAVENOUS at 18:26

## 2021-06-05 RX ADMIN — SODIUM CHLORIDE 1000 ML: 9 INJECTION, SOLUTION INTRAVENOUS at 18:17

## 2021-06-05 RX ADMIN — MORPHINE SULFATE 2 MG: 2 INJECTION, SOLUTION INTRAMUSCULAR; INTRAVENOUS at 22:34

## 2021-06-05 RX ADMIN — CEFTRIAXONE 2 G: 2 INJECTION, POWDER, FOR SOLUTION INTRAMUSCULAR; INTRAVENOUS at 20:20

## 2021-06-05 NOTE — ED PROVIDER NOTES
Subjective   35-year-old female presents secondary to generalized malaise along with body aches and burning with urination.  Patient states that she has been feeling bad for about the last week.  This is been worse over the past day.  She has had subjective fever with chills.  She has had nausea.  No vomiting.  No diarrhea.  She states that she has had a slight cough.  She denies any exposure to Covid or Covid symptoms otherwise.  No flank pain.          Review of Systems   Constitutional: Positive for fatigue and fever.   HENT: Negative.    Respiratory: Negative.    Cardiovascular: Negative.  Negative for chest pain.   Gastrointestinal: Negative.  Negative for abdominal pain.   Endocrine: Negative.    Genitourinary: Positive for dysuria and frequency.   Musculoskeletal: Positive for myalgias.   Skin: Negative.    Neurological: Negative.    Psychiatric/Behavioral: Negative.    All other systems reviewed and are negative.      Past Medical History:   Diagnosis Date   • Anxiety    • Depression    • Hepatitis C    • Horseshoe kidney    • Hypertension    • PID (pelvic inflammatory disease)        No Known Allergies    Past Surgical History:   Procedure Laterality Date   •  SECTION     •  SECTION N/A 2020    Procedure:  SECTION REPEAT;  Surgeon: Sonya Barton DO;  Location: [facility] LABOR DELIVERY;  Service: Obstetrics/Gynecology;  Laterality: N/A;       Family History   Problem Relation Age of Onset   • Alcohol abuse Father    • Depression Father    • Hypertension Father    • Stroke Father    • COPD Mother    • Diabetes Mother    • Hypertension Mother    • Alcohol abuse Paternal Grandfather    • Kidney disease Paternal Grandfather    • Stroke Paternal Grandfather    • Kidney cancer Paternal Grandmother    • Coronary artery disease Paternal Grandmother    • Hypertension Paternal Grandmother    • Asthma Maternal Grandmother    • Kidney cancer Maternal Grandmother    • Depression  Maternal Grandmother    • Diabetes Maternal Grandmother    • Hypertension Maternal Grandmother    • Kidney disease Maternal Grandmother    • Alcohol abuse Maternal Grandfather    • Lung cancer Maternal Grandfather        Social History     Socioeconomic History   • Marital status: Legally      Spouse name: Not on file   • Number of children: Not on file   • Years of education: Not on file   • Highest education level: Not on file   Tobacco Use   • Smoking status: Current Every Day Smoker     Packs/day: 2.00   • Smokeless tobacco: Never Used   Substance and Sexual Activity   • Alcohol use: No   • Drug use: Yes     Types: Methamphetamines, Oxycodone, Amphetamines, Marijuana, Benzodiazepines     Comment: suboxone, neurontin   • Sexual activity: Yes           Objective   Physical Exam  Vitals and nursing note reviewed.   Constitutional:       General: She is not in acute distress.     Appearance: She is well-developed. She is not diaphoretic.   HENT:      Head: Normocephalic and atraumatic.      Right Ear: External ear normal.      Left Ear: External ear normal.      Nose: Nose normal.   Eyes:      Conjunctiva/sclera: Conjunctivae normal.      Pupils: Pupils are equal, round, and reactive to light.   Neck:      Vascular: No JVD.      Trachea: No tracheal deviation.   Cardiovascular:      Rate and Rhythm: Normal rate and regular rhythm.      Heart sounds: Normal heart sounds. No murmur heard.     Pulmonary:      Effort: Pulmonary effort is normal. No respiratory distress.      Breath sounds: Normal breath sounds. No wheezing.   Abdominal:      General: Bowel sounds are normal.      Palpations: Abdomen is soft.      Tenderness: There is no abdominal tenderness. There is no right CVA tenderness or left CVA tenderness.   Musculoskeletal:         General: No deformity. Normal range of motion.      Cervical back: Normal range of motion and neck supple.   Skin:     General: Skin is warm and dry.      Coloration: Skin  is not pale.      Findings: No erythema or rash.   Neurological:      Mental Status: She is alert and oriented to person, place, and time.      Cranial Nerves: No cranial nerve deficit.   Psychiatric:         Behavior: Behavior normal.         Thought Content: Thought content normal.         Procedures           ED Course  ED Course as of Jun 06 0830   Sat Jun 05, 2021 1903 Signed out to Dr. Sparks    [JI]   2020 Assumed patient's care from Patrick at the end of his shift.  Please see his documentation above.  Hospitalist paged for admission.    [CM]      ED Course User Index  [CM] Jose Sparks MD  [JI] Pelon Daniels PA                                           MDM  Number of Diagnoses or Management Options  Pyelonephritis: new and requires workup     Amount and/or Complexity of Data Reviewed  Clinical lab tests: reviewed and ordered        Final diagnoses:   Pyelonephritis       ED Disposition  ED Disposition     ED Disposition Condition Comment    Decision to Admit  Level of Care: Med/Surg [1]   Diagnosis: Pyelonephritis [[id number]]   Admitting Physician: BENNIE SANCHEZ [[id number]]   Certification: I Certify That Inpatient Hospital Services Are Medically Necessary For Greater Than 2 Midnights            No follow-up provider specified.       Medication List      No changes were made to your prescriptions during this visit.          Pelon Daniels PA  06/06/21 0830

## 2021-06-06 LAB
6-ACETYL MORPHINE: NEGATIVE
ALBUMIN SERPL-MCNC: 3.39 G/DL (ref 3.5–5.2)
ALBUMIN/GLOB SERPL: 1 G/DL
ALP SERPL-CCNC: 80 U/L (ref 39–117)
ALT SERPL W P-5'-P-CCNC: 11 U/L (ref 1–33)
AMPHET+METHAMPHET UR QL: NEGATIVE
ANION GAP SERPL CALCULATED.3IONS-SCNC: 13.3 MMOL/L (ref 5–15)
AST SERPL-CCNC: 12 U/L (ref 1–32)
BARBITURATES UR QL SCN: NEGATIVE
BASOPHILS # BLD AUTO: 0.05 10*3/MM3 (ref 0–0.2)
BASOPHILS NFR BLD AUTO: 0.3 % (ref 0–1.5)
BENZODIAZ UR QL SCN: NEGATIVE
BILIRUB SERPL-MCNC: 0.2 MG/DL (ref 0–1.2)
BUN SERPL-MCNC: 10 MG/DL (ref 6–20)
BUN/CREAT SERPL: 10.2 (ref 7–25)
BUPRENORPHINE SERPL-MCNC: NEGATIVE NG/ML
CALCIUM SPEC-SCNC: 8.6 MG/DL (ref 8.6–10.5)
CANNABINOIDS SERPL QL: NEGATIVE
CHLORIDE SERPL-SCNC: 103 MMOL/L (ref 98–107)
CO2 SERPL-SCNC: 18.7 MMOL/L (ref 22–29)
COCAINE UR QL: NEGATIVE
CREAT SERPL-MCNC: 0.98 MG/DL (ref 0.57–1)
CRP SERPL-MCNC: 12.11 MG/DL (ref 0–0.5)
DEPRECATED RDW RBC AUTO: 41.2 FL (ref 37–54)
EOSINOPHIL # BLD AUTO: 0.02 10*3/MM3 (ref 0–0.4)
EOSINOPHIL NFR BLD AUTO: 0.1 % (ref 0.3–6.2)
ERYTHROCYTE [DISTWIDTH] IN BLOOD BY AUTOMATED COUNT: 13.3 % (ref 12.3–15.4)
GFR SERPL CREATININE-BSD FRML MDRD: 65 ML/MIN/1.73
GLOBULIN UR ELPH-MCNC: 3.5 GM/DL
GLUCOSE SERPL-MCNC: 119 MG/DL (ref 65–99)
HCT VFR BLD AUTO: 38.6 % (ref 34–46.6)
HGB BLD-MCNC: 12.6 G/DL (ref 12–15.9)
IMM GRANULOCYTES # BLD AUTO: 0.1 10*3/MM3 (ref 0–0.05)
IMM GRANULOCYTES NFR BLD AUTO: 0.6 % (ref 0–0.5)
LYMPHOCYTES # BLD AUTO: 2.04 10*3/MM3 (ref 0.7–3.1)
LYMPHOCYTES NFR BLD AUTO: 11.6 % (ref 19.6–45.3)
MAGNESIUM SERPL-MCNC: 1.8 MG/DL (ref 1.6–2.6)
MCH RBC QN AUTO: 28.1 PG (ref 26.6–33)
MCHC RBC AUTO-ENTMCNC: 32.6 G/DL (ref 31.5–35.7)
MCV RBC AUTO: 86 FL (ref 79–97)
METHADONE UR QL SCN: NEGATIVE
MONOCYTES # BLD AUTO: 1.34 10*3/MM3 (ref 0.1–0.9)
MONOCYTES NFR BLD AUTO: 7.6 % (ref 5–12)
NEUTROPHILS NFR BLD AUTO: 13.99 10*3/MM3 (ref 1.7–7)
NEUTROPHILS NFR BLD AUTO: 79.8 % (ref 42.7–76)
NRBC BLD AUTO-RTO: 0 /100 WBC (ref 0–0.2)
OPIATES UR QL: NEGATIVE
OXYCODONE UR QL SCN: NEGATIVE
PCP UR QL SCN: NEGATIVE
PHOSPHATE SERPL-MCNC: 3.6 MG/DL (ref 2.5–4.5)
PLATELET # BLD AUTO: 264 10*3/MM3 (ref 140–450)
PMV BLD AUTO: 10.4 FL (ref 6–12)
POTASSIUM SERPL-SCNC: 3.3 MMOL/L (ref 3.5–5.2)
POTASSIUM SERPL-SCNC: 4.1 MMOL/L (ref 3.5–5.2)
PROT SERPL-MCNC: 6.9 G/DL (ref 6–8.5)
QT INTERVAL: 344 MS
QTC INTERVAL: 469 MS
RBC # BLD AUTO: 4.49 10*6/MM3 (ref 3.77–5.28)
SODIUM SERPL-SCNC: 135 MMOL/L (ref 136–145)
TSH SERPL DL<=0.05 MIU/L-ACNC: 0.48 UIU/ML (ref 0.27–4.2)
WBC # BLD AUTO: 17.54 10*3/MM3 (ref 3.4–10.8)

## 2021-06-06 PROCEDURE — 85025 COMPLETE CBC W/AUTO DIFF WBC: CPT | Performed by: INTERNAL MEDICINE

## 2021-06-06 PROCEDURE — 80053 COMPREHEN METABOLIC PANEL: CPT | Performed by: INTERNAL MEDICINE

## 2021-06-06 PROCEDURE — 25010000002 MAGNESIUM SULFATE IN D5W 1G/100ML (PREMIX) 1-5 GM/100ML-% SOLUTION

## 2021-06-06 PROCEDURE — 99233 SBSQ HOSP IP/OBS HIGH 50: CPT | Performed by: PHYSICIAN ASSISTANT

## 2021-06-06 PROCEDURE — 84132 ASSAY OF SERUM POTASSIUM: CPT | Performed by: INTERNAL MEDICINE

## 2021-06-06 PROCEDURE — 83735 ASSAY OF MAGNESIUM: CPT | Performed by: PHYSICIAN ASSISTANT

## 2021-06-06 PROCEDURE — 86140 C-REACTIVE PROTEIN: CPT | Performed by: INTERNAL MEDICINE

## 2021-06-06 PROCEDURE — 25010000002 ENOXAPARIN PER 10 MG: Performed by: INTERNAL MEDICINE

## 2021-06-06 PROCEDURE — 25010000002 MORPHINE PER 10 MG: Performed by: INTERNAL MEDICINE

## 2021-06-06 PROCEDURE — 25010000002 CEFTRIAXONE PER 250 MG: Performed by: INTERNAL MEDICINE

## 2021-06-06 PROCEDURE — 25010000002 KETOROLAC TROMETHAMINE PER 15 MG: Performed by: INTERNAL MEDICINE

## 2021-06-06 PROCEDURE — 84100 ASSAY OF PHOSPHORUS: CPT | Performed by: PHYSICIAN ASSISTANT

## 2021-06-06 PROCEDURE — 25010000002 PROCHLORPERAZINE 10 MG/2ML SOLUTION: Performed by: INTERNAL MEDICINE

## 2021-06-06 RX ORDER — MAGNESIUM SULFATE 1 G/100ML
1 INJECTION INTRAVENOUS ONCE
Status: COMPLETED | OUTPATIENT
Start: 2021-06-06 | End: 2021-06-06

## 2021-06-06 RX ORDER — POTASSIUM CHLORIDE 1.5 G/1.77G
40 POWDER, FOR SOLUTION ORAL AS NEEDED
Status: DISCONTINUED | OUTPATIENT
Start: 2021-06-06 | End: 2021-06-08 | Stop reason: HOSPADM

## 2021-06-06 RX ORDER — POTASSIUM CHLORIDE 7.45 MG/ML
10 INJECTION INTRAVENOUS
Status: DISCONTINUED | OUTPATIENT
Start: 2021-06-06 | End: 2021-06-08 | Stop reason: HOSPADM

## 2021-06-06 RX ORDER — NICOTINE 21 MG/24HR
1 PATCH, TRANSDERMAL 24 HOURS TRANSDERMAL
Status: DISCONTINUED | OUTPATIENT
Start: 2021-06-06 | End: 2021-06-08 | Stop reason: HOSPADM

## 2021-06-06 RX ORDER — POTASSIUM CHLORIDE 20 MEQ/1
40 TABLET, EXTENDED RELEASE ORAL EVERY 4 HOURS
Status: COMPLETED | OUTPATIENT
Start: 2021-06-06 | End: 2021-06-06

## 2021-06-06 RX ORDER — POTASSIUM CHLORIDE 20 MEQ/1
40 TABLET, EXTENDED RELEASE ORAL AS NEEDED
Status: DISCONTINUED | OUTPATIENT
Start: 2021-06-06 | End: 2021-06-08 | Stop reason: HOSPADM

## 2021-06-06 RX ORDER — IBUPROFEN 600 MG/1
600 TABLET ORAL EVERY 6 HOURS PRN
Status: DISCONTINUED | OUTPATIENT
Start: 2021-06-06 | End: 2021-06-08 | Stop reason: HOSPADM

## 2021-06-06 RX ORDER — MAGNESIUM SULFATE 1 G/100ML
1 INJECTION INTRAVENOUS AS NEEDED
Status: DISCONTINUED | OUTPATIENT
Start: 2021-06-06 | End: 2021-06-08 | Stop reason: HOSPADM

## 2021-06-06 RX ORDER — MAGNESIUM SULFATE HEPTAHYDRATE 40 MG/ML
2 INJECTION, SOLUTION INTRAVENOUS AS NEEDED
Status: DISCONTINUED | OUTPATIENT
Start: 2021-06-06 | End: 2021-06-08 | Stop reason: HOSPADM

## 2021-06-06 RX ORDER — NITROGLYCERIN 0.4 MG/1
0.4 TABLET SUBLINGUAL
Status: DISCONTINUED | OUTPATIENT
Start: 2021-06-06 | End: 2021-06-08 | Stop reason: HOSPADM

## 2021-06-06 RX ADMIN — PROCHLORPERAZINE EDISYLATE 5 MG: 5 INJECTION INTRAMUSCULAR; INTRAVENOUS at 06:23

## 2021-06-06 RX ADMIN — KETOROLAC TROMETHAMINE 30 MG: 30 INJECTION, SOLUTION INTRAMUSCULAR; INTRAVENOUS at 16:16

## 2021-06-06 RX ADMIN — NICOTINE 1 PATCH: 14 PATCH, EXTENDED RELEASE TRANSDERMAL at 08:56

## 2021-06-06 RX ADMIN — PROCHLORPERAZINE EDISYLATE 5 MG: 5 INJECTION INTRAMUSCULAR; INTRAVENOUS at 16:50

## 2021-06-06 RX ADMIN — SODIUM CHLORIDE 125 ML/HR: 9 INJECTION, SOLUTION INTRAVENOUS at 06:55

## 2021-06-06 RX ADMIN — ACETAMINOPHEN 650 MG: 325 TABLET ORAL at 15:24

## 2021-06-06 RX ADMIN — MORPHINE SULFATE 2 MG: 2 INJECTION, SOLUTION INTRAMUSCULAR; INTRAVENOUS at 05:35

## 2021-06-06 RX ADMIN — POTASSIUM CHLORIDE 40 MEQ: 20 TABLET, EXTENDED RELEASE ORAL at 08:55

## 2021-06-06 RX ADMIN — Medication 1 CAPSULE: at 08:19

## 2021-06-06 RX ADMIN — SODIUM CHLORIDE, PRESERVATIVE FREE 10 ML: 5 INJECTION INTRAVENOUS at 21:42

## 2021-06-06 RX ADMIN — ACETAMINOPHEN 650 MG: 325 TABLET ORAL at 05:35

## 2021-06-06 RX ADMIN — SODIUM CHLORIDE 125 ML/HR: 9 INJECTION, SOLUTION INTRAVENOUS at 15:24

## 2021-06-06 RX ADMIN — MORPHINE SULFATE 2 MG: 2 INJECTION, SOLUTION INTRAMUSCULAR; INTRAVENOUS at 21:33

## 2021-06-06 RX ADMIN — MORPHINE SULFATE 2 MG: 2 INJECTION, SOLUTION INTRAMUSCULAR; INTRAVENOUS at 13:37

## 2021-06-06 RX ADMIN — MAGNESIUM SULFATE HEPTAHYDRATE 1 G: 1 INJECTION, SOLUTION INTRAVENOUS at 08:54

## 2021-06-06 RX ADMIN — IBUPROFEN 600 MG: 600 TABLET, FILM COATED ORAL at 16:50

## 2021-06-06 RX ADMIN — POTASSIUM CHLORIDE 40 MEQ: 20 TABLET, EXTENDED RELEASE ORAL at 13:31

## 2021-06-06 RX ADMIN — KETOROLAC TROMETHAMINE 30 MG: 30 INJECTION, SOLUTION INTRAMUSCULAR; INTRAVENOUS at 06:18

## 2021-06-06 RX ADMIN — PANTOPRAZOLE SODIUM 40 MG: 40 TABLET, DELAYED RELEASE ORAL at 05:35

## 2021-06-06 RX ADMIN — ENOXAPARIN SODIUM 40 MG: 40 INJECTION SUBCUTANEOUS at 08:55

## 2021-06-06 RX ADMIN — CEFTRIAXONE 2 G: 2 INJECTION, POWDER, FOR SOLUTION INTRAMUSCULAR; INTRAVENOUS at 22:22

## 2021-06-06 RX ADMIN — Medication 5 MG: at 21:33

## 2021-06-06 NOTE — PAYOR COMM NOTE
"[facility]  ALISSON WERNER  PHONE  [phone]  FAX  [phone]  NPI:  [id number]    PENDING REF#[id number]    Abby Woo (35 y.o. Female)     Date of Birth Social Security Number Address Home Phone MRN    1985  [address] [facility] [address] [phone] [id number]    Holiness Marital Status          Zoroastrian Legally        Admission Date Admission Type Admitting Provider Attending Provider Department, Room/Bed    21 Emergency Nadja Duckworth DO Perkins, Jimmye S, MD [address], [address]/    Discharge Date Discharge Disposition Discharge Destination                       Attending Provider: Alex Can MD    Allergies: No Known Allergies    Isolation: None   Infection: COVID (rule out) (21)   Code Status: CPR    Ht: 160 cm (63\")   Wt: 83.9 kg (185 lb)    Admission Cmt: None   Principal Problem: Pyelonephritis [N12]                 Active Insurance as of 2021     Primary Coverage     Payor Plan Insurance Group Employer/Plan Group    ANTHEM MEDICAID ANTHEM MEDICAID [id number]     Payor Plan Address Payor Plan Phone Number Payor Plan Fax Number Effective Dates    [address] [address] [phone]  2020 - None Entered    [facility] [id number]       Subscriber Name Subscriber Birth Date Member ID       ABBY WOO 1985 [id number]                 Emergency Contacts      (Rel.) Home Phone Work Phone Mobile Phone    ESTHER MORRISON (Mother) [phone] -- --               History & Physical      Rosmery Shah PA-C at 21 2245     Attestation signed by Nadja Duckworth DO at 21 0731    I have read the documentation below and agree.                       [facility] Medicine Services  HISTORY & PHYSICAL    Patient Identification:  Name:  Abby Woo  Age:  35 y.o.  Sex:  female  :  1985  MRN:  [id number]   Visit Number:  [id number]  Admit Date: 2021 "   Primary Care Physician:  Provider, No Known     Subjective     Chief complaint:   Chief Complaint   Patient presents with   • Fever   • Dysuria   • Generalized Body Aches     History of presenting illness:   Patient is a 35 y.o. female with past medical history significant for essential hypertension, hepatitis C, anxiety, depression, that presented to the [facility] emergency department for evaluation of fever, chills, dysuria    The patient reports that around 1 week ago she began experiencing difficulty with urination, dysuria, hematuria, and cloudy urine.  She states that her symptoms have progressively worsened since this time and she developed chills, nausea, diarrhea x2 weeks (last episode Thursday), decreased appetite, myalgias and a fever of 101 °F that resolved with Motrin.  In addition, she has been experiencing lower abdominal pain described as a soreness, as well as left CVA tenderness for the past several days.  She denies any congestion, sore throat, coughing, wheezing, shortness of breath, chest pain, hematochezia/melena, emesis, vaginal discharge, wounds, dizziness, lightheadedness, syncope.  She further denies any recent antibiotics.    Upon arrival to the ED, vitals were temperature 98.6 °F, pulse 129, respirations 22, /99, SPO2 98% on room air.  Troponin T negative x1.  CMP with glucose 145, sodium 132, CO2 18.8, chloride 97, anion gap 16.2.  CRP 9.25.  CBC with WBC 19.66.  UA with cloudy appearance, 3+ blood, positive nitrites, 2+ leukocytes, 2+ protein, 31-50 RBC, too numerous to count WBC, 3+ bacteria, 0-2 squamous epithelia.  Blood cultures pending x2.  Urine culture pending.  Chest x-ray shows no acute cardiopulmonary findings.  CT of the abdomen/pelvis with contrast shows multiple fluid-filled and mildly dilated loops of small bowel with enhancement of the small bowel wall, findings suggestive of enteritis, redemonstrated horseshoe kidney, no hydronephrosis, small amount  of free fluid in the dependent portion of the pelvis.  EKG with sinus tachycardia, mild T wave inversion in lead III, heart rate 112 bpm, QTc 469 not intense.  COVID-19 negative.    In the emergency department the patient received IV Rocephin 2 g, IV Toradol 15 mg, IV morphine 4 mg, IV Zofran 4 mg, IV normal saline 1 L bolus.    Patient has been admitted to the medical/surgical floor for further evaluation and treatment   ---------------------------------------------------------------------------------------------------------------------   Review of Systems   Constitutional: Positive for appetite change, chills, diaphoresis and fever (101 F ).   HENT: Negative for congestion and sore throat.    Eyes: Negative for discharge and visual disturbance.   Respiratory: Negative for cough, shortness of breath and wheezing.    Cardiovascular: Negative for chest pain, palpitations and leg swelling.   Gastrointestinal: Positive for abdominal pain (lower abdominal pain ), diarrhea (x 2 weeks; last episode thursday ) and nausea. Negative for constipation and vomiting.   Genitourinary: Positive for difficulty urinating, dysuria, flank pain (left) and hematuria. Negative for frequency, vaginal bleeding and vaginal discharge.   Musculoskeletal: Positive for myalgias. Negative for gait problem.   Skin: Negative for rash and wound.   Neurological: Negative for dizziness, syncope and light-headedness.   Psychiatric/Behavioral: Negative for confusion. The patient is not nervous/anxious.       ---------------------------------------------------------------------------------------------------------------------   Past Medical History:   Diagnosis Date   • Anxiety    • Depression    • Hepatitis C    • Horseshoe kidney    • Hypertension    • PID (pelvic inflammatory disease)      Past Surgical History:   Procedure Laterality Date   •  SECTION     •  SECTION N/A 2020    Procedure:  SECTION REPEAT;  Surgeon:  Sonya Barton DO;  Location: [facility] LABOR DELIVERY;  Service: Obstetrics/Gynecology;  Laterality: N/A;     Family History   Problem Relation Age of Onset   • Alcohol abuse Father    • Depression Father    • Hypertension Father    • Stroke Father    • COPD Mother    • Diabetes Mother    • Hypertension Mother    • Alcohol abuse Paternal Grandfather    • Kidney disease Paternal Grandfather    • Stroke Paternal Grandfather    • Kidney cancer Paternal Grandmother    • Coronary artery disease Paternal Grandmother    • Hypertension Paternal Grandmother    • Asthma Maternal Grandmother    • Kidney cancer Maternal Grandmother    • Depression Maternal Grandmother    • Diabetes Maternal Grandmother    • Hypertension Maternal Grandmother    • Kidney disease Maternal Grandmother    • Alcohol abuse Maternal Grandfather    • Lung cancer Maternal Grandfather      Social History     Socioeconomic History   • Marital status: Legally      Spouse name: Not on file   • Number of children: Not on file   • Years of education: Not on file   • Highest education level: Not on file   Tobacco Use   • Smoking status: Current Every Day Smoker     Packs/day: 2.00   • Smokeless tobacco: Never Used   Substance and Sexual Activity   • Alcohol use: No   • Drug use: Yes     Types: Methamphetamines, Oxycodone, Amphetamines, Marijuana, Benzodiazepines     Comment: suboxone, neurontin   • Sexual activity: Yes     ---------------------------------------------------------------------------------------------------------------------   Allergies:  Patient has no known allergies.  ---------------------------------------------------------------------------------------------------------------------   Medications below are reported home medications pulling from within the system; at this time, these medications have not been reconciled unless otherwise specified and are in the verification process for further verifcation as current home  medications.    Prior to Admission Medications     Prescriptions Last Dose Informant Patient Reported? Taking?    cetirizine (zyrTEC) 10 MG tablet Past Week  Yes Yes    Take 10 mg by mouth Daily.    lisinopril (PRINIVIL,ZESTRIL) 30 MG tablet Past Week  Yes Yes    Take 1 tablet by mouth Daily.    sertraline (ZOLOFT) 50 MG tablet   Yes No    50 mg.        ---------------------------------------------------------------------------------------------------------------------    Objective     Hospital Scheduled Meds:  cefTRIAXone, 2 g, Intravenous, Q24H  sodium chloride, 10 mL, Intravenous, Q12H      sodium chloride, 125 mL/hr, Last Rate: 125 mL/hr (06/05/21 2234)        Current listed hospital scheduled medications may not yet reflect those currently placed in orders that are signed and held, awaiting patient's arrival to floor/unit.    ---------------------------------------------------------------------------------------------------------------------   Vital Signs:  Temp:  [98.6 °F (37 °C)-100.2 °F (37.9 °C)] 100.2 °F (37.9 °C)  Heart Rate:  [101-129] 108  Resp:  [20-24] 24  BP: (136-158)/() 158/96  Mean Arterial Pressure (Non-Invasive) for the past 24 hrs (Last 3 readings):   Noninvasive MAP (mmHg)   06/05/21 2047 108   06/05/21 1902 128   06/05/21 1847 119     SpO2 Percentage    06/05/21 2053 06/05/21 2054 06/05/21 2215   SpO2: 99% 98% 97%     SpO2:  [97 %-100 %] 97 %  on   ;   Device (Oxygen Therapy): room air    Body mass index is 32.77 kg/m².  Wt Readings from Last 3 Encounters:   06/05/21 83.9 kg (185 lb)   09/30/20 76.2 kg (168 lb)   09/08/20 71.7 kg (158 lb)     ---------------------------------------------------------------------------------------------------------------------   Physical Exam:  Physical Exam  Constitutional:       General: She is awake. She is not in acute distress.     Appearance: Normal appearance. She is well-developed. She is obese.   HENT:      Head: Normocephalic and atraumatic.    Eyes:      General: Lids are normal.         Right eye: No discharge.         Left eye: No discharge.   Cardiovascular:      Rate and Rhythm: Normal rate and regular rhythm.      Pulses: Normal pulses. No decreased pulses.           Dorsalis pedis pulses are 2+ on the right side and 2+ on the left side.        Posterior tibial pulses are 2+ on the right side and 2+ on the left side.      Heart sounds: Normal heart sounds. No murmur heard.   No friction rub. No gallop.    Pulmonary:      Effort: Pulmonary effort is normal. No tachypnea or bradypnea.      Breath sounds: Normal breath sounds. No decreased breath sounds, wheezing, rhonchi or rales.   Abdominal:      General: Bowel sounds are normal.      Palpations: Abdomen is soft.      Tenderness: There is generalized abdominal tenderness (generalized tenderness but worse in the suprapubic region). There is left CVA tenderness. There is no guarding.   Musculoskeletal:      Right lower leg: No edema.      Left lower leg: No edema.   Skin:     Findings: No abrasion, ecchymosis or erythema.   Neurological:      General: No focal deficit present.      Mental Status: She is alert and oriented to person, place, and time. Mental status is at baseline.   Psychiatric:         Speech: Speech normal.         Behavior: Behavior is cooperative.         Cognition and Memory: Cognition normal.       ---------------------------------------------------------------------------------------------------------------------  EKG:    Pending cardiology read. Per my evaluation, sinus tachycardia, T wave inversion in lead III, , QTc 469 ms     Telemetry:    Not currently on telemetry     I have personally reviewed the EKG  ---------------------------------------------------------------------------------------------------------------------   Results from last 7 days   Lab Units 06/05/21  1816   TROPONIN T ng/mL <0.010     Results from last 7 days   Lab Units 06/05/21  1816   PROBNP pg/mL  350.9         Results from last 7 days   Lab Units 06/05/21  1816 06/05/21  1815   CRP mg/dL 9.25*  --    LACTATE mmol/L  --  1.9   WBC 10*3/mm3  --  19.66*   HEMOGLOBIN g/dL  --  14.5   HEMATOCRIT %  --  44.0   MCV fL  --  84.0   MCHC g/dL  --  33.0   PLATELETS 10*3/mm3  --  346     Results from last 7 days   Lab Units 06/05/21 1816   SODIUM mmol/L 132*   POTASSIUM mmol/L 3.8   CHLORIDE mmol/L 97*   CO2 mmol/L 18.8*   BUN mg/dL 10   CREATININE mg/dL 0.98   EGFR IF NONAFRICN AM mL/min/1.73 65   CALCIUM mg/dL 9.8   GLUCOSE mg/dL 145*   ALBUMIN g/dL 4.36   BILIRUBIN mg/dL 0.5   ALK PHOS U/L 101   AST (SGOT) U/L 16   ALT (SGPT) U/L 14   Estimated Creatinine Clearance: 83 mL/min (by C-G formula based on SCr of 0.98 mg/dL).    Pain Management Panel     Pain Management Panel Latest Ref Rng & Units 6/27/2020 6/21/2020    AMPHETAMINES SCREEN, URINE Negative Negative Positive(A)    BARBITURATES SCREEN Negative Negative Negative    BENZODIAZEPINE SCREEN, URINE Negative Negative Negative    BUPRENORPHINEUR Negative Negative Negative    COCAINE SCREEN, URINE Negative Negative Negative    METHADONE SCREEN, URINE Negative Negative Negative        I have personally reviewed the above laboratory results.   ---------------------------------------------------------------------------------------------------------------------  Imaging Results (Last 7 Days)     Procedure Component Value Units Date/Time    CT Abdomen Pelvis With Contrast [[id number]] Collected: 06/05/21 1939     Updated: 06/05/21 1941    Narrative:      CT Abdomen Pelvis W    INDICATION:   One week history of abdominal pain and low back pain with difficulty urinating.    TECHNIQUE:   CT of the abdomen and pelvis with contrast. Coronal and sagittal reconstructions were obtained.  Radiation dose reduction techniques included automated exposure control or exposure modulation based on body size. Radiation audit for number of CT and  nuclear cardiology exams performed in the  last year: 1.      COMPARISON:   9/16/2020    FINDINGS:  Included lung bases are clear.    Abdomen: The liver shows normal enhancement. The gallbladder appears unremarkable. The kidneys show normal enhancement. 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 No hydronephrosis. No perinephric stranding.    The spleen and pancreas appear unremarkable. No adrenal abnormalities. No threshold retroperitoneal adenopathy.    Pelvis: The bowel pattern demonstrates multiple fluid-filled and minimally dilated small bowel loops. The pattern suggest an enteritis, either infectious or inflammatory. The colon appears unremarkable. There is a small amount of free fluid demonstrated  within the cul-de-sac. No free air is identified. The urinary bladder appears unremarkable. The uterus is anteflexed.    No threshold pelvic or inguinal adenopathy. Regional osseous structures show no acute or aggressive bone lesions.      Impression:        1.  The small bowel demonstrate multiple fluid-filled and mildly dilated loops of small bowel with enhancement of the small bowel wall. The findings suggest an enteritis, either infectious or inflammatory.    2.  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 No hydronephrosis.    3.  Small amount of free fluid demonstrated in the dependent portion of the pelvis.          Signer Name: Memo Chavez MD   Signed: 6/5/2021 7:39 PM   Workstation Name: [id number]    Radiology [facility]    XR Chest 1 View [[id number]] Collected: 06/05/21 1919     Updated: 06/05/21 1921    Narrative:      CR Chest 1 Vw    INDICATION:   Acute onset of fever and dysuria     COMPARISON:    9/15/2016    FINDINGS:  Single portable AP view(s) of the chest.  The heart and mediastinal contours are normal. The lungs are clear. No pneumothorax or pleural effusion.      Impression:      No acute cardiopulmonary findings.    Signer Name: Memo Chavez MD   Signed: 6/5/2021 7:19 PM   Workstation Name: [facility]    [facility]        I have personally reviewed the above radiology results.     ---------------------------------------------------------------------------------------------------------------------    Assessment & Plan      Active Hospital Problems    Diagnosis  POA   • Pyelonephritis [N12]  Yes     #Sepsis POA (pulse 129, RR 24, CrP 9.25, WBC 19.66) 2/2 to left sided pyelonephritis   #Acute UTI POA   #Proteinuria  #Hematuria   #Elevated anion gap   -CT of the abdomen/pelvis with contrast shows a horseshoe kidney, no perinephric stranding, however, patient with severe left CVA tenderness   -UA shows 3+ blood, positive nitrites, 2+ leukocytes, 2+ protein, 31-50 RBC, few numerous to count WBC, 3+ bacteria, 0-2 squamous epithelia  -Patient received IV Rocephin 2 g in the emergency department; continue with the symptomatic's on the floor  -Lactobacillus for GI protection  -Urine and blood cultures pending  -IV Toradol 30 mg every 6 hours and IV morphine 2 mg every 4 hours as needed for pain  -Repeat a.m. CMP, CBC, and CRP  -Tylenol as needed for fever  -Continue monitor closely    #?  Enteritis  -No diarrhea since Thursday; took an antidiarreal   -CT of the abdomen/pelvis with contrast revealed multiple fluid-filled and mildly dilated loops of small bowel with enhancement of the small bowel wall that was concerning for enteritis  -Patient denies any sick contacts, recent antibiotics, or emesis  -We will hold on obtaining GI panel for now; should diarrhea recur, will order   -Supportive care  -IV  mL/h  -IV Compazine q6 hours PRN for N/V     #Hyponatremia  -Corrected sodium for hyperglycemia is 133  -IV normal saline 125 mL/h  -Repeat a.m. CMP    #Hyperglycemia with no known history of diabetes mellitus  -Obtain hemoglobin A1c    #Prolonged QTc (469 ms)   -Obtain magnesium level; replace as necessary  -Avoid QTC prolonging agents as much possible    #Horshoe kidney   -Noted on CT of  abdomen/pelvis  -Renal function is within normal limits    #Hepatitis C   -Liver enzymes are within normal limits  -Patient states she is about to start Mavyret   -Repeat AM CMP and monitor liver function closely     #Essential hypertension   -Plan to resume home antihypertensive agents as appropriate with holding parameters  -We will make IV hydralazine available as needed for SBP greater than 170  -Continue monitor VS per hospital protocol    #Anxiety   #Depression   -Supportive care  -Reviewed home medications once reconciled per pharmacy    #Smoking tobacco use  -Smoking cessation counseling given  -Denies wanting NRT at this time    #Obesity   -BMI 32.77 kg/m2   -Complicates all aspects of patient care     F/E/N: IV normal saline 125 mL/h.  Replace electrolytes as necessary.  Regular diet.    ---------------------------------------------------  DVT Prophylaxis: SCDS  GI Prophylaxis: Protonix   Activity: up with assistance, fall precautions     The patient is considered to be a high risk patient due to: Sepsis 2/2 to left-sided pyelonephritis; acute UTI POA; hyponatremia    INPATIENT status due to the need for care which can only be reasonably provided in an hospital setting such as aggressive/expedited ancillary services and/or consultation services, the necessity for IV medications, close physician monitoring and/or the possible need for procedures.  In such, I feel patient’s risk for adverse outcomes and need for care warrant INPATIENT evaluation and predict the patient’s care encounter to likely last beyond 2 midnights.    Code Status: FULL CODE     I have discussed the patient's assessment and plan with the patient, nursing staff, and attending physician      Rosmrey Shah PA-C  Hospitalist Service -- [facility]       06/05/21  22:45 EDT    Attending Physician: Nadja Duckworth DO        Electronically signed by Nadja Duckworth DO at 06/06/21 7897          Emergency Department  Notes      Love Tidwell RN at 21 1740        Notified Lead RN (Donna)/Provider of Sepsis + screen.     Love Tidwell RN  21      Electronically signed by Love Tidwell RN at 21     Pelon Daniels PA at 21 0996          Subjective   35-year-old female presents secondary to generalized malaise along with body aches and burning with urination.  Patient states that she has been feeling bad for about the last week.  This is been worse over the past day.  She has had subjective fever with chills.  She has had nausea.  No vomiting.  No diarrhea.  She states that she has had a slight cough.  She denies any exposure to Covid or Covid symptoms otherwise.  No flank pain.          Review of Systems   Constitutional: Positive for fatigue and fever.   HENT: Negative.    Respiratory: Negative.    Cardiovascular: Negative.  Negative for chest pain.   Gastrointestinal: Negative.  Negative for abdominal pain.   Endocrine: Negative.    Genitourinary: Positive for dysuria and frequency.   Musculoskeletal: Positive for myalgias.   Skin: Negative.    Neurological: Negative.    Psychiatric/Behavioral: Negative.    All other systems reviewed and are negative.      Past Medical History:   Diagnosis Date   • Anxiety    • Depression    • Hepatitis C    • Horseshoe kidney    • Hypertension    • PID (pelvic inflammatory disease)        No Known Allergies    Past Surgical History:   Procedure Laterality Date   •  SECTION     •  SECTION N/A 2020    Procedure:  SECTION REPEAT;  Surgeon: Sonya Barton DO;  Location: [facility] LABOR DELIVERY;  Service: Obstetrics/Gynecology;  Laterality: N/A;       Family History   Problem Relation Age of Onset   • Alcohol abuse Father    • Depression Father    • Hypertension Father    • Stroke Father    • COPD Mother    • Diabetes Mother    • Hypertension Mother    • Alcohol abuse Paternal Grandfather    • Kidney disease Paternal  Grandfather    • Stroke Paternal Grandfather    • Kidney cancer Paternal Grandmother    • Coronary artery disease Paternal Grandmother    • Hypertension Paternal Grandmother    • Asthma Maternal Grandmother    • Kidney cancer Maternal Grandmother    • Depression Maternal Grandmother    • Diabetes Maternal Grandmother    • Hypertension Maternal Grandmother    • Kidney disease Maternal Grandmother    • Alcohol abuse Maternal Grandfather    • Lung cancer Maternal Grandfather        Social History     Socioeconomic History   • Marital status: Legally      Spouse name: Not on file   • Number of children: Not on file   • Years of education: Not on file   • Highest education level: Not on file   Tobacco Use   • Smoking status: Current Every Day Smoker     Packs/day: 2.00   • Smokeless tobacco: Never Used   Substance and Sexual Activity   • Alcohol use: No   • Drug use: Yes     Types: Methamphetamines, Oxycodone, Amphetamines, Marijuana, Benzodiazepines     Comment: suboxone, neurontin   • Sexual activity: Yes           Objective   Physical Exam  Vitals and nursing note reviewed.   Constitutional:       General: She is not in acute distress.     Appearance: She is well-developed. She is not diaphoretic.   HENT:      Head: Normocephalic and atraumatic.      Right Ear: External ear normal.      Left Ear: External ear normal.      Nose: Nose normal.   Eyes:      Conjunctiva/sclera: Conjunctivae normal.      Pupils: Pupils are equal, round, and reactive to light.   Neck:      Vascular: No JVD.      Trachea: No tracheal deviation.   Cardiovascular:      Rate and Rhythm: Normal rate and regular rhythm.      Heart sounds: Normal heart sounds. No murmur heard.     Pulmonary:      Effort: Pulmonary effort is normal. No respiratory distress.      Breath sounds: Normal breath sounds. No wheezing.   Abdominal:      General: Bowel sounds are normal.      Palpations: Abdomen is soft.      Tenderness: There is no abdominal  tenderness. There is no right CVA tenderness or left CVA tenderness.   Musculoskeletal:         General: No deformity. Normal range of motion.      Cervical back: Normal range of motion and neck supple.   Skin:     General: Skin is warm and dry.      Coloration: Skin is not pale.      Findings: No erythema or rash.   Neurological:      Mental Status: She is alert and oriented to person, place, and time.      Cranial Nerves: No cranial nerve deficit.   Psychiatric:         Behavior: Behavior normal.         Thought Content: Thought content normal.         Procedures          ED Course  ED Course as of Jun 06 0830   Sat Jun 05, 2021 1903 Signed out to Dr. Sparks    [JI]   2020 Assumed patient's care from Patrick at the end of his shift.  Please see his documentation above.  Hospitalist paged for admission.    [CM]      ED Course User Index  [CM] Jose Sparks MD  [JI] Pelon Daniels PA                                           MDM  Number of Diagnoses or Management Options  Pyelonephritis: new and requires workup     Amount and/or Complexity of Data Reviewed  Clinical lab tests: reviewed and ordered        Final diagnoses:   Pyelonephritis       ED Disposition  ED Disposition     ED Disposition Condition Comment    Decision to Admit  Level of Care: Med/Surg [1]   Diagnosis: Pyelonephritis [031025]   Admitting Physician: BENNIE SANCHEZ [[id number]]   Certification: I Certify That Inpatient Hospital Services Are Medically Necessary For Greater Than 2 Midnights            No follow-up provider specified.       Medication List      No changes were made to your prescriptions during this visit.          Pelon Daniels PA  06/06/21 0830      Electronically signed by Pelon Daniels PA at 06/06/21 0830     Lynda Villatoro at 06/05/21 1844        Attempted blood draw twice; unable to obtain labs     Lynda Villatoro  06/05/21 1844      Electronically signed by Lynda Villatoro at 06/05/21 1844     Tamar Harrington RN at  06/05/21 1906        Report received from SILVERIO Dale Macie, RN  06/05/21 1906      Electronically signed by Tamar Harrington RN at 06/05/21 1906     Tamar Harrington RN at 06/05/21 2130        Report given to SILVERIO Mcmillan at this time.      Tamar Harrington RN  06/05/21 2131      Electronically signed by Tamar Harrington RN at 06/05/21 2131         Current Facility-Administered Medications   Medication Dose Route Frequency Provider Last Rate Last Admin   • acetaminophen (TYLENOL) tablet 650 mg  650 mg Oral Q6H PRN Rosmery Shah PA-C   650 mg at 06/06/21 0535   • calcium carbonate (TUMS) chewable tablet 500 mg (200 mg elemental)  2 tablet Oral TID PRN Rosmery Shah PA-C       • cefTRIAXone (ROCEPHIN) 2 g/100 mL 0.9% NS IVPB (MBP)  2 g Intravenous Q24H Nadja Duckworth DO   2 g at 06/05/21 2347   • enoxaparin (LOVENOX) syringe 40 mg  40 mg Subcutaneous Q24H Alex Can MD   40 mg at 06/06/21 0855   • hydrALAZINE (APRESOLINE) injection 10 mg  10 mg Intravenous Q6H PRN Rosmery Shah PA-C       • hydrOXYzine (ATARAX) tablet 25 mg  25 mg Oral TID PRN Rosmery Shah PA-C       • ketorolac (TORADOL) injection 30 mg  30 mg Intravenous Q6H PRN Nadja Duckworth DO   30 mg at 06/06/21 0618   • lactobacillus acidophilus (RISAQUAD) capsule 1 capsule  1 capsule Oral Daily Rosmery Shah PA-C   1 capsule at 06/06/21 0819   • Magnesium Sulfate 2 gram infusion - Mg less than or equal to 1.5 mg/dL  2 g Intravenous PRN Latha Vasquez PA        Or   • Magnesium Sulfate 1 gram infusion - Mg 1.6-1.9 mg/dL  1 g Intravenous PRN Latha Vasquez PA       • Magnesium Sulfate 1 gram infusion - Mg 1.6-1.9 mg/dL  1 g Intravenous Once Conchis Lockett,  mL/hr at 06/06/21 0854 1 g at 06/06/21 0854   • melatonin tablet 5 mg  5 mg Oral Nightly PRN Rosmery Shah PA-C       • morphine injection 2 mg  2 mg Intravenous Q4H PRN Nadja Duckworth,    2 mg at 06/06/21 0535   • nicotine  (NICODERM CQ) 14 MG/24HR patch 1 patch  1 patch Transdermal Q24H FREDDIE'Latha Mac PA   1 patch at 06/06/21 0856   • nitroglycerin (NITROSTAT) SL tablet 0.4 mg  0.4 mg Sublingual Q5 Min PRN Latha Vasquez PA       • pantoprazole (PROTONIX) EC tablet 40 mg  40 mg Oral Q AM Rosmery Shah PA-C   40 mg at 06/06/21 0535   • Pharmacy to Dose enoxaparin (LOVENOX)   Does not apply Continuous PRN Latha Vasquez PA       • potassium chloride (K-DUR,KLOR-CON) CR tablet 40 mEq  40 mEq Oral PRN Latha Vasquez PA        Or   • potassium chloride (KLOR-CON) packet 40 mEq  40 mEq Oral PRN Latha Vasquez PA        Or   • potassium chloride 10 mEq in 100 mL IVPB  10 mEq Intravenous Q1H PRN Latha Vasquez PA       • potassium chloride (K-DUR,KLOR-CON) CR tablet 40 mEq  40 mEq Oral Q4H Conchis Lockett, RPH   40 mEq at 06/06/21 0855   • prochlorperazine (COMPAZINE) injection 5 mg  5 mg Intravenous Q6H PRN Nadja Duckworth DO   5 mg at 06/06/21 0623   • sodium chloride 0.9 % flush 10 mL  10 mL Intravenous PRN Nadja Duckworth, DO       • sodium chloride 0.9 % flush 10 mL  10 mL Intravenous Q12H Nadja Duckworth DO       • sodium chloride 0.9 % flush 10 mL  10 mL Intravenous PRN Nadja Duckworth DO       • sodium chloride 0.9 % infusion  125 mL/hr Intravenous Continuous Nadja Duckworth  mL/hr at 06/06/21 0655 125 mL/hr at 06/06/21 0655     Orders (last 24 hrs)      Start     Ordered    06/07/21 0600  CBC & Differential  Morning Draw      06/06/21 0756    06/07/21 0600  Comprehensive Metabolic Panel  Morning Draw      06/06/21 0756    06/07/21 0600  Magnesium  Morning Draw      06/06/21 0756 06/07/21 0600  Phosphorus  Morning Draw      06/06/21 0756    06/07/21 0600  C-reactive Protein  Morning Draw      06/06/21 0756    06/07/21 0500  ECG 12 Lead  Tomorrow AM      06/06/21 0756    06/06/21 1000  Magnesium Sulfate 1 gram infusion - Mg 1.6-1.9 mg/dL  Once      06/06/21 0800     06/06/21 1000  potassium chloride (K-DUR,KLOR-CON) CR tablet 40 mEq  Every 4 Hours      06/06/21 0800    06/06/21 1000  nicotine (NICODERM CQ) 14 MG/24HR patch 1 patch  Every 24 Hours Scheduled      06/06/21 0825    06/06/21 1000  enoxaparin (LOVENOX) syringe 40 mg  Every 24 Hours      06/06/21 0833    06/06/21 0900  lactobacillus acidophilus (RISAQUAD) capsule 1 capsule  Daily      06/05/21 2335    06/06/21 0826  Ambulate In [place]  Every Shift      06/06/21 0825    06/06/21 0825  Pharmacy to Dose enoxaparin (LOVENOX)  Continuous PRN      06/06/21 0825 06/06/21 0821  Urine Drug Screen - Urine, Clean Catch  Once      06/06/21 0820    06/06/21 0756  Telemetry - Maintain IV Access  Continuous      06/06/21 0755    06/06/21 0756  Continuous Cardiac Monitoring  Continuous      06/06/21 0755    06/06/21 0756  May Be Off Telemetry for Tests  Continuous      06/06/21 0755    06/06/21 0756  ACLS Protocol For Life Threatening Dysrhythmias (Unless Code Status Indicates Otherwise)  Continuous      06/06/21 0755    06/06/21 0756  Notify Provider if ACLS Protocol Activated  Until Discontinued      06/06/21 0755    06/06/21 0755  nitroglycerin (NITROSTAT) SL tablet 0.4 mg  Every 5 Minutes PRN      06/06/21 0755    06/06/21 0754  Patient Currently On Electrolyte Replacement Protocol - Please Refer to MAR for Protocol Details  Misc Nursing Order (Specify)  Daily     Comments: Patient Currently On Electrolyte Replacement Protocol - Please Refer to MAR for Protocol Details    06/06/21 0753    06/06/21 0753  potassium chloride (K-DUR,KLOR-CON) CR tablet 40 mEq  As Needed      06/06/21 0753    06/06/21 0753  potassium chloride (KLOR-CON) packet 40 mEq  As Needed      06/06/21 0753    06/06/21 0753  potassium chloride 10 mEq in 100 mL IVPB  Every 1 Hour PRN      06/06/21 0753    06/06/21 0753  Magnesium Sulfate 2 gram infusion - Mg less than or equal to 1.5 mg/dL  As Needed      06/06/21 0753    06/06/21 0753  Magnesium Sulfate 1  gram infusion - Mg 1.6-1.9 mg/dL  As Needed      06/06/21 0753    06/06/21 0600  CBC & Differential  Morning Draw      06/05/21 2217 06/06/21 0600  Comprehensive Metabolic Panel  Morning Draw      06/05/21 2217 06/06/21 0600  C-reactive Protein  Morning Draw      06/05/21 2217 06/06/21 0600  Magnesium  Morning Draw      06/05/21 2335    06/06/21 0600  Phosphorus  Morning Draw      06/05/21 2335    06/06/21 0600  pantoprazole (PROTONIX) EC tablet 40 mg  Every Early Morning      06/05/21 2335    06/06/21 0600  CBC Auto Differential  PROCEDURE ONCE      06/06/21 0006    06/06/21 0020  Apply Heat To Affected Area  Once     Comments: Kpad    06/06/21 0019    06/06/21 0000  Vital Signs  Every 4 Hours     Comments: Per per hospital policy    06/05/21 2217    06/05/21 2335  calcium carbonate (TUMS) chewable tablet 500 mg (200 mg elemental)  3 Times Daily PRN      06/05/21 2335    06/05/21 2335  hydrOXYzine (ATARAX) tablet 25 mg  3 Times Daily PRN      06/05/21 2335    06/05/21 2335  melatonin tablet 5 mg  Nightly PRN      06/05/21 2335    06/05/21 2335  Hemoglobin A1c  Once      06/05/21 2335    06/05/21 2335  TSH  Once      06/05/21 2335    06/05/21 2334  hydrALAZINE (APRESOLINE) injection 10 mg  Every 6 Hours PRN      06/05/21 2335    06/05/21 2334  acetaminophen (TYLENOL) tablet 650 mg  Every 6 Hours PRN      06/05/21 2335    06/05/21 2316  Apply Heat To Affected Area  Once,   Status:  Canceled      06/05/21 2315    06/05/21 2315  sodium chloride 0.9 % flush 10 mL  Every 12 Hours Scheduled      06/05/21 2217 06/05/21 2315  sodium chloride 0.9 % infusion  Continuous      06/05/21 2217 06/05/21 2315  cefTRIAXone (ROCEPHIN) 2 g/100 mL 0.9% NS IVPB (MBP)  Every 24 Hours      06/05/21 2217 06/05/21 2218  Notify Physician (with Parameters)  Until Discontinued      06/05/21 2217 06/05/21 2218  Intake & Output  Every Shift      06/05/21 2217 06/05/21 2218  Weigh patient  Once      06/05/21 2217     06/05/21 2218  Oxygen Therapy- Nasal Cannula; Titrate for SPO2: 90% - 95%  Continuous      06/05/21 2217 06/05/21 2218  Insert Peripheral IV  Once      06/05/21 2217 06/05/21 2218  Saline Lock & Maintain IV Access  Continuous,   Status:  Canceled      06/05/21 2217 06/05/21 2218  Activity - Ad Margarita  Until Discontinued      06/05/21 2217 06/05/21 2218  Diet Regular  Diet Effective Now      06/05/21 2217 06/05/21 2218  Place Sequential Compression Device  Once      06/05/21 2217 06/05/21 2218  Maintain Sequential Compression Device  Continuous      06/05/21 2217 06/05/21 2218  Place Sequential Compression Device  Once      06/05/21 2217 06/05/21 2218  Maintain Sequential Compression Device  Continuous,   Status:  Canceled      06/05/21 2217 06/05/21 2217  sodium chloride 0.9 % flush 10 mL  As Needed      06/05/21 2217 06/05/21 2217  prochlorperazine (COMPAZINE) injection 5 mg  Every 6 Hours PRN      06/05/21 2217 06/05/21 2217  ketorolac (TORADOL) injection 30 mg  Every 6 Hours PRN      06/05/21 2217 06/05/21 2217  morphine injection 2 mg  Every 4 Hours PRN      06/05/21 2217 06/05/21 2052  Code Status and Medical Interventions:  Continuous      06/05/21 2056 06/05/21 2049  Inpatient Admission  Once      06/05/21 2056 06/05/21 2038  COVID-19 and FLU A/B PCR - Swab, Nasopharynx  Once      06/05/21 2037 06/05/21 2022  morphine injection 4 mg  Once      06/05/21 2020 06/05/21 1923  iopamidol (ISOVUE-300) 61 % injection 100 mL  Once in Imaging      06/05/21 1921 06/05/21 1902  cefTRIAXone (ROCEPHIN) 2 g/100 mL 0.9% NS IVPB (MBP)  Once      06/05/21 1900    06/05/21 1900  Urine Culture - Urine, Urine, Clean Catch  Once      06/05/21 1859    06/05/21 1856  Urinalysis, Microscopic Only - Urine, Clean Catch  Once      06/05/21 1855 06/05/21 1851  CT Abdomen Pelvis With Contrast  1 Time Imaging      06/05/21 1850 06/05/21 1850  Urinalysis With Culture If Indicated -  Urine, Clean Catch  Once      06/05/21 1850    06/05/21 1813  sodium chloride 0.9 % bolus 1,000 mL  Once      06/05/21 1811 06/05/21 1813  ketorolac (TORADOL) injection 15 mg  Once      06/05/21 1811 06/05/21 1813  ondansetron (ZOFRAN) injection 4 mg  Once      06/05/21 1811 06/05/21 1809  CBC & Differential  Once      06/05/21 1811 06/05/21 1809  Comprehensive Metabolic Panel  Once      06/05/21 1811 06/05/21 1809  Pregnancy, Urine - Urine, Clean Catch  Once      06/05/21 1811    06/05/21 1809  [place] Draw  Once      06/05/21 1811 06/05/21 1809  Lipase  Once      06/05/21 1811 06/05/21 1809  Troponin  Once      06/05/21 1811 06/05/21 1809  BNP  Once      06/05/21 1811 06/05/21 1809  C-reactive Protein  Once      06/05/21 1811 06/05/21 1809  Sedimentation Rate  Once      06/05/21 1811 06/05/21 1809  Blood Culture - Blood, Hand, Right  Once      06/05/21 1811 06/05/21 1809  Blood Culture - Blood, Arm, Left  Once      06/05/21 1811 06/05/21 1809  Lactic Acid, Plasma  Once      06/05/21 1811 06/05/21 1809  Insert peripheral IV  Once      06/05/21 1811 06/05/21 1809  XR Chest 1 View  1 Time Imaging      06/05/21 1811 06/05/21 1809  ECG 12 Lead  Once      06/05/21 1811 06/05/21 1809  CBC Auto Differential  PROCEDURE ONCE      06/05/21 1811 06/05/21 1809  Light Blue Top  PROCEDURE ONCE      06/05/21 1811 06/05/21 1809  Green Top (Gel)  PROCEDURE ONCE      06/05/21 1811 06/05/21 1809  Lavender Top  PROCEDURE ONCE      06/05/21 1811 06/05/21 1809  Gold Top - SST  PROCEDURE ONCE      06/05/21 1811 06/05/21 1808  sodium chloride 0.9 % flush 10 mL  As Needed      06/05/21 1811    Unscheduled  Magnesium  As Needed      06/06/21 0753    Unscheduled  Potassium  As Needed      06/06/21 0753    Unscheduled  Telemetry - Pulse Oximetry  Continuous PRN     Comments: If Patient Develops Unresponsiveness, Acute Dyspnea, Cyanosis or Suspected Hypoxemia Start Continuous  Pulse Ox Monitoring, Apply Oxygen & Notify Provider    21    Unscheduled  ECG 12 Lead  As Needed     Comments: Nurse to Release if Patient Expericences Acute Chest Pain or Dysrhythmias    21    Unscheduled  Potassium  As Needed     Comments: For Ventricular Arrhythmias      21    Unscheduled  Magnesium  As Needed     Comments: For Ventricular Arrhythmias      21    Unscheduled  Digoxin Level  As Needed     Comments: For Atrial Arrhythmias      21    Unscheduled  Blood Gas, Arterial -With Co-Ox Panel: Yes  As Needed     Comments: Per O2 PolicyNotify Physician      21    Unscheduled  Up In Chair  As Needed      21    --  cetirizine (zyrTEC) 10 MG tablet  Daily      21                   Physician Progress Notes (last 24 hours) (Notes from 21 through 21)      Latha Vasquez PA at 21 0809                                    [facility] Medicine Services  PROGRESS NOTE     Patient Identification:  Name:  Kacy Woo  Age:  35 y.o.  Sex:  female  :  1985  MRN:  [id number]  Visit Number:  [id number]  Primary Care Provider:  Provider, No Known    Length of stay:  1    ----------------------------------------------------------------------------------------------------------------------  Subjective     Chief Complaint:  Follow up for sepsis, UTI, pyelonephritis, electrolyte abnormalities, QTc prolongation    History of Presenting Illness/Hospital Course:  Patient is a 37 yo female with past medical history significant for essential hypertension, chronic hepatitis C, anxiety/depression, and ongoing tobacco abuse that presented to the [facility] emergency department on 2021 for evaluation of fever, chills, and dysuria. Please see admitting history and physical for further details. Patient was found to have sepsis, present on admission, secondary to acute UTI with  probable left pyelonephritis with CVA tenderness, CT without perinephric stranding/hydroureter/hydronephrosis. Blood and urine cultures obtained, pending. Patient empirically treated with high dose IV rocephin. Gentle IV fluids continued with PRN antipyretics and antiemetics. Electrolytes replaced per protocol.     Subjective:  Today, the patient was lying in bed per my evaluation this morning, no acute distress noted.  No overnight events reported per AM ISLVERIO Rosa.  Patient with low-grade fever overnight.  She states she does feel some better, but continues to feel ill.  She reports achy sensation over her entire body.  Continues with left flank pain, improved.  She denies any further nausea, vomiting, or diarrhea.  She does report a cough, nonproductive that started yesterday.  She denies any congestion or sore throat.  She does report a mild headache, no visual disturbances.  Denies any dizziness or LOC.    Present during exam: N/A   ----------------------------------------------------------------------------------------------------------------------  Objective     Consults:  • None     Procedures:  • None      Current Hospital Meds:  cefTRIAXone, 2 g, Intravenous, Q24H  lactobacillus acidophilus, 1 capsule, Oral, Daily  magnesium sulfate in D5W 1g/100mL (PREMIX), 1 g, Intravenous, Once  pantoprazole, 40 mg, Oral, Q AM  potassium chloride, 40 mEq, Oral, Q4H  sodium chloride, 10 mL, Intravenous, Q12H      sodium chloride, 125 mL/hr, Last Rate: 125 mL/hr (06/05/21 1099)      ----------------------------------------------------------------------------------------------------------------------  Vital Signs:  Temp:  [98.6 °F (37 °C)-100.2 °F (37.9 °C)] 99.9 °F (37.7 °C)  Heart Rate:  [] 103  Resp:  [20-24] 20  BP: (124-158)/() 124/85  Mean Arterial Pressure (Non-Invasive) for the past 24 hrs (Last 3 readings):   Noninvasive MAP (mmHg)   06/05/21 2047 108   06/05/21 1902 128   06/05/21 1847 119     SpO2  Percentage    06/05/21 2215 06/06/21 0336 06/06/21 0615   SpO2: 97% 97% 98%     SpO2:  [97 %-100 %] 98 %  on   ;   Device (Oxygen Therapy): room air    Body mass index is 32.77 kg/m².  Wt Readings from Last 3 Encounters:   06/05/21 83.9 kg (185 lb)   09/30/20 76.2 kg (168 lb)   09/08/20 71.7 kg (158 lb)        Intake/Output Summary (Last 24 hours) at 6/6/2021 0809  Last data filed at 6/6/2021 0627  Gross per 24 hour   Intake 3348 ml   Output --   Net 3348 ml     Diet Regular  ----------------------------------------------------------------------------------------------------------------------  Physical exam:  Physical Exam  Nursing note reviewed.   Constitutional:       General: She is awake. She is not in acute distress.     Appearance: She is well-developed. She is obese. She is not toxic-appearing.      Comments: Lying in bed, no acute distress noted.  Patient appears to feel unwell.  On room air.  No family present at bedside.   HENT:      Head: Normocephalic and atraumatic.      Mouth/Throat:      Mouth: Mucous membranes are moist.   Eyes:      Conjunctiva/sclera: Conjunctivae normal.      Pupils: Pupils are equal, round, and reactive to light.   Cardiovascular:      Rate and Rhythm: Normal rate and regular rhythm.      Pulses:           Dorsalis pedis pulses are 2+ on the right side and 2+ on the left side.        Posterior tibial pulses are 2+ on the right side and 2+ on the left side.      Heart sounds: Normal heart sounds. No murmur heard.   No friction rub. No gallop.    Pulmonary:      Effort: Pulmonary effort is normal. No respiratory distress.      Breath sounds: Normal breath sounds and air entry. No wheezing, rhonchi or rales.      Comments: Speaks in full sentences without dyspnea, on room air.  Abdominal:      General: Bowel sounds are normal. There is no distension.      Palpations: Abdomen is soft.      Tenderness: There is generalized abdominal tenderness and tenderness in the suprapubic area.  There is left CVA tenderness. There is no guarding or rebound.      Comments: No peritoneal signs   Genitourinary:     Comments: No odom catheter in place.  Musculoskeletal:      Cervical back: Normal range of motion and neck supple.      Right lower leg: No edema.      Left lower leg: No edema.   Skin:     General: Skin is warm and dry.      Capillary Refill: Capillary refill takes less than 2 seconds.      Findings: No abscess, erythema, lesion or wound.   Neurological:      General: No focal deficit present.      Mental Status: She is alert and oriented to person, place, and time.      Cranial Nerves: Cranial nerves are intact.      Sensory: Sensation is intact.      Motor: Motor function is intact.      Comments: Awake and alert. Follows commands. Answers questions appropriately. Moves all extremities equally. Strength and sensation intact. No focal neuro deficit on exam.   Psychiatric:         Attention and Perception: Attention normal.         Mood and Affect: Mood and affect normal.         Speech: Speech normal.         Behavior: Behavior normal. Behavior is cooperative.         Thought Content: Thought content normal.         Cognition and Memory: Cognition and memory normal.         Judgment: Judgment normal.        ----------------------------------------------------------------------------------------------------------------------  Tele:    Sinus 80s    I have personally reviewed the EKG/Telemetry strips   ----------------------------------------------------------------------------------------------------------------------  Results from last 7 days   Lab Units 06/05/21  1816   TROPONIN T ng/mL <0.010     Results from last 7 days   Lab Units 06/05/21  1816   PROBNP pg/mL 350.9         Results from last 7 days   Lab Units 06/06/21  0155 06/05/21  1816 06/05/21 1815   CRP mg/dL 12.11* 9.25*  --    LACTATE mmol/L  --   --  1.9   WBC 10*3/mm3 17.54*  --  19.66*   HEMOGLOBIN g/dL 12.6  --  14.5   HEMATOCRIT  % 38.6  --  44.0   MCV fL 86.0  --  84.0   MCHC g/dL 32.6  --  33.0   PLATELETS 10*3/mm3 264  --  346     Results from last 7 days   Lab Units 06/06/21  0155 06/05/21  1816   SODIUM mmol/L 135* 132*   POTASSIUM mmol/L 3.3* 3.8   MAGNESIUM mg/dL 1.8  --    CHLORIDE mmol/L 103 97*   CO2 mmol/L 18.7* 18.8*   BUN mg/dL 10 10   CREATININE mg/dL 0.98 0.98   EGFR IF NONAFRICN AM mL/min/1.73 65 65   CALCIUM mg/dL 8.6 9.8   GLUCOSE mg/dL 119* 145*   ALBUMIN g/dL 3.39* 4.36   BILIRUBIN mg/dL 0.2 0.5   ALK PHOS U/L 80 101   AST (SGOT) U/L 12 16   ALT (SGPT) U/L 11 14   Estimated Creatinine Clearance: 83 mL/min (by C-G formula based on SCr of 0.98 mg/dL).    Lab Results   Component Value Date    HGBA1C 5.60 06/05/2021     Lab Results   Component Value Date    TSH 0.485 06/05/2021     Microbiology Results (last 10 days)     Procedure Component Value - Date/Time    COVID-19 and FLU A/B PCR - Swab, Nasopharynx [[id number]]  (Normal) Collected: 06/05/21 2043    Lab Status: Final result Specimen: Swab from Nasopharynx Updated: 06/05/21 2107     COVID19 Not Detected     Influenza A PCR Not Detected     Influenza B PCR Not Detected    Narrative:      Fact sheet for providers: https://www.fda.gov/media/316470/download    Fact sheet for patients: https://www.fda.gov/media/115579/download    Test performed by PCR.         Pain Management Panel     Pain Management Panel Latest Ref Rng & Units 6/27/2020 6/21/2020    AMPHETAMINES SCREEN, URINE Negative Negative Positive(A)    BARBITURATES SCREEN Negative Negative Negative    BENZODIAZEPINE SCREEN, URINE Negative Negative Negative    BUPRENORPHINEUR Negative Negative Negative    COCAINE SCREEN, URINE Negative Negative Negative    METHADONE SCREEN, URINE Negative Negative Negative            I have personally reviewed the above laboratory results.   ----------------------------------------------------------------------------------------------------------------------  Imaging Results (Last 24  Hours)     Procedure Component Value Units Date/Time    CT Abdomen Pelvis With Contrast [[id number]] Collected: 06/05/21 1939     Updated: 06/05/21 1941    Narrative:      FINDINGS:  Included lung bases are clear.  Abdomen: The liver shows normal enhancement. The gallbladder appears unremarkable. The kidneys show normal enhancement. 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 No hydronephrosis. No perinephric stranding.  The spleen and pancreas appear unremarkable. No adrenal abnormalities. No threshold retroperitoneal adenopathy.  Pelvis: The bowel pattern demonstrates multiple fluid-filled and minimally dilated small bowel loops. The pattern suggest an enteritis, either infectious or inflammatory. The colon appears unremarkable. There is a small amount of free fluid demonstrated  within the cul-de-sac. No free air is identified. The urinary bladder appears unremarkable. The uterus is anteflexed.  No threshold pelvic or inguinal adenopathy. Regional osseous structures show no acute or aggressive bone lesions.    Impression:      1.  The small bowel demonstrate multiple fluid-filled and mildly dilated loops of small bowel with enhancement of the small bowel wall. The findings suggest an enteritis, either infectious or inflammatory.  2.  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 No hydronephrosis.  3.  Small amount of free fluid demonstrated in the dependent portion of the pelvis.    Signer Name: Memo Chavez MD   Signed: 6/5/2021 7:39 PM   Workstation Name: [email or web address]    Radiology [facility]    XR Chest 1 View [[id number]] Collected: 06/05/21 1919     Updated: 06/05/21 1921    Narrative:      FINDINGS:  Single portable AP view(s) of the chest.  The heart and mediastinal contours are normal. The lungs are clear. No pneumothorax or pleural effusion.      Impression:      No acute cardiopulmonary findings.    Signer Name: Memo Chavez MD   Signed: 6/5/2021 7:19 PM   Workstation Name: [email or web address]    Radiology  [facility]      I have personally reviewed the above radiology results.   ----------------------------------------------------------------------------------------------------------------------  Assessment/Plan     -Sepsis, present on admission, secondary to acute urinary tract infection with concern for left sided pyelonephritis with CVA tenderness  -Horseshoe kidney  • CT without perinephric stranding/hydronephrotis/hydroureter, but symptomatic with left CVA tenderness of exam, treat for pyelo  • Blood and urine cultures pending, follow for final results   • CRP slightly increased, WBC improved   • Patient with low grade fever overnight, remains slightly tachycardic   • Continue empiric coverage with high dose IV Rocephin   • Lactobacillus for gut pierre protection   • PRN antipyretics for fever, monitor temperature curve closely    • IV fluids on board   • PRN pain medication with holding parameters   • Repeat CBC and CRP in AM    -Enteritis   · CT of abdomen with multiple fluid filled and minimally dilated loops of small bowel with enhancement of small bowel wall, suggesting enteritis.  · No current diarrhea  · Provide supportive care fore now   · Should patient develop further diarrhea, will send GI panel   · PRN antiemetics, gentle IV fluids     -Hypokalemia   -Hypomagnesemia   -Mild QTc prolongation, 469 m/s   · Replace electrolytes per protocol   · Avoid any further QTc prolonging agents as much as possible   · Telemetry monitoring   · Repeat chem panel, mag level, and ECG in AM    -Mild hyponatremia   · Improved   · Continue IVF   · Monitor closely, repeat chem panel in AM    -Mild hyperglycemia without hx of diabetes mellitus  • HgbA1C 5.6, no further work up necessary     -Essential hypertension   · BP initially labile on presentation with improved control   · Hold home lisinopril currently in setting of sepsis, UTI  · IV fluids on board  · Closely monitor BP per hospital protocol, titrate  medications as necessary   · PRN hydralazine IV available     -Chronic hepatitis C   · LFTs stable   · Monitor closely   · Encourage outpatient GI follow up for possible treatment   · Urine drug screen added to specimen in lab    -Anxiety   -Depression   · Supportive care     -Tobacco abuse   · Encourage cessation   · NRT replacement available     -Hypoalbuminemia   · Multifactorial   · Monitor closely     -Obesity by BMI, 32.77    --------------------------------------------------  DVT Prophylaxis: SQ lovenox   GI Prophylaxis: PPI   FEN: Gentle IV fluids. Replace electrolytes per protocol as necessary. Regular diet.  Activity: As tolerated   --------------------------------------------------  Disposition:  • Plans on home at discharge   • Pending urine culture finalization, remains on IV antibiotics   --------------------------------------------------    The patient is high risk due to the following diagnoses/reasons: Sepsis, UTI, pyelonephritis, electrolyte abnormalities, QTc prolongation, HTN, hepatitis C, ongoing tobacco abuse, obesity     I have discussed the patient's assessment and plan with the patient, AM SILVERIO Rosa, and attending physician Alex Szymanski MD Allyson O'Kuma, PA-C  Hospitalist Service -- [facility]   Pager: [phone]    06/06/21  08:09 EDT    Attending Physician: Alex Can MD    ----------------------------------------------------------------------------------------------------------------------        Electronically signed by Latha Vasquez PA at 06/06/21 0839       Consult Notes (last 24 hours) (Notes from 06/05/21 0948 through 06/06/21 0948)    No notes of this type exist for this encounter.

## 2021-06-06 NOTE — PROGRESS NOTES
[facility] Medicine Services  PROGRESS NOTE     Patient Identification:  Name:  Kacy Woo  Age:  35 y.o.  Sex:  female  :  1985  MRN:  [id number]  Visit Number:  [id number]  Primary Care Provider:  Provider, No Known    Length of stay:  1    ----------------------------------------------------------------------------------------------------------------------  Subjective     Chief Complaint:  Follow up for sepsis, UTI, pyelonephritis, electrolyte abnormalities, QTc prolongation    History of Presenting Illness/Hospital Course:  Patient is a 39 yo female with past medical history significant for essential hypertension, chronic hepatitis C, anxiety/depression, and ongoing tobacco abuse that presented to the [facility] emergency department on 2021 for evaluation of fever, chills, and dysuria. Please see admitting history and physical for further details. Patient was found to have sepsis, present on admission, secondary to acute UTI with probable left pyelonephritis with CVA tenderness, CT without perinephric stranding/hydroureter/hydronephrosis. Blood and urine cultures obtained, pending. Patient empirically treated with high dose IV rocephin. Gentle IV fluids continued with PRN antipyretics and antiemetics. Electrolytes replaced per protocol.     Subjective:  Today, the patient was lying in bed per my evaluation this morning, no acute distress noted.  No overnight events reported per AM SILVERIO Rosa.  Patient with low-grade fever overnight.  She states she does feel some better, but continues to feel ill.  She reports achy sensation over her entire body.  Continues with left flank pain, improved.  She denies any further nausea, vomiting, or diarrhea.  She does report a cough, nonproductive that started yesterday.  She denies any congestion or sore throat.  She does report a mild headache, no visual disturbances.  Denies any dizziness or  LOC.    Present during exam: N/A   ----------------------------------------------------------------------------------------------------------------------  Objective     Consults:  • None     Procedures:  • None      Current Hospital Meds:  cefTRIAXone, 2 g, Intravenous, Q24H  lactobacillus acidophilus, 1 capsule, Oral, Daily  magnesium sulfate in D5W 1g/100mL (PREMIX), 1 g, Intravenous, Once  pantoprazole, 40 mg, Oral, Q AM  potassium chloride, 40 mEq, Oral, Q4H  sodium chloride, 10 mL, Intravenous, Q12H      sodium chloride, 125 mL/hr, Last Rate: 125 mL/hr (06/05/21 2234)      ----------------------------------------------------------------------------------------------------------------------  Vital Signs:  Temp:  [98.6 °F (37 °C)-100.2 °F (37.9 °C)] 99.9 °F (37.7 °C)  Heart Rate:  [] 103  Resp:  [20-24] 20  BP: (124-158)/() 124/85  Mean Arterial Pressure (Non-Invasive) for the past 24 hrs (Last 3 readings):   Noninvasive MAP (mmHg)   06/05/21 2047 108   06/05/21 1902 128   06/05/21 1847 119     SpO2 Percentage    06/05/21 2215 06/06/21 0336 06/06/21 0615   SpO2: 97% 97% 98%     SpO2:  [97 %-100 %] 98 %  on   ;   Device (Oxygen Therapy): room air    Body mass index is 32.77 kg/m².  Wt Readings from Last 3 Encounters:   06/05/21 83.9 kg (185 lb)   09/30/20 76.2 kg (168 lb)   09/08/20 71.7 kg (158 lb)        Intake/Output Summary (Last 24 hours) at 6/6/2021 0809  Last data filed at 6/6/2021 0627  Gross per 24 hour   Intake 3348 ml   Output --   Net 3348 ml     Diet Regular  ----------------------------------------------------------------------------------------------------------------------  Physical exam:  Physical Exam  Nursing note reviewed.   Constitutional:       General: She is awake. She is not in acute distress.     Appearance: She is well-developed. She is obese. She is not toxic-appearing.      Comments: Lying in bed, no acute distress noted.  Patient appears to feel unwell.  On room air.  No  family present at bedside.   HENT:      Head: Normocephalic and atraumatic.      Mouth/Throat:      Mouth: Mucous membranes are moist.   Eyes:      Conjunctiva/sclera: Conjunctivae normal.      Pupils: Pupils are equal, round, and reactive to light.   Cardiovascular:      Rate and Rhythm: Normal rate and regular rhythm.      Pulses:           Dorsalis pedis pulses are 2+ on the right side and 2+ on the left side.        Posterior tibial pulses are 2+ on the right side and 2+ on the left side.      Heart sounds: Normal heart sounds. No murmur heard.   No friction rub. No gallop.    Pulmonary:      Effort: Pulmonary effort is normal. No respiratory distress.      Breath sounds: Normal breath sounds and air entry. No wheezing, rhonchi or rales.      Comments: Speaks in full sentences without dyspnea, on room air.  Abdominal:      General: Bowel sounds are normal. There is no distension.      Palpations: Abdomen is soft.      Tenderness: There is generalized abdominal tenderness and tenderness in the suprapubic area. There is left CVA tenderness. There is no guarding or rebound.      Comments: No peritoneal signs   Genitourinary:     Comments: No odom catheter in place.  Musculoskeletal:      Cervical back: Normal range of motion and neck supple.      Right lower leg: No edema.      Left lower leg: No edema.   Skin:     General: Skin is warm and dry.      Capillary Refill: Capillary refill takes less than 2 seconds.      Findings: No abscess, erythema, lesion or wound.   Neurological:      General: No focal deficit present.      Mental Status: She is alert and oriented to person, place, and time.      Cranial Nerves: Cranial nerves are intact.      Sensory: Sensation is intact.      Motor: Motor function is intact.      Comments: Awake and alert. Follows commands. Answers questions appropriately. Moves all extremities equally. Strength and sensation intact. No focal neuro deficit on exam.   Psychiatric:          Attention and Perception: Attention normal.         Mood and Affect: Mood and affect normal.         Speech: Speech normal.         Behavior: Behavior normal. Behavior is cooperative.         Thought Content: Thought content normal.         Cognition and Memory: Cognition and memory normal.         Judgment: Judgment normal.        ----------------------------------------------------------------------------------------------------------------------  Tele:    Sinus 80s    I have personally reviewed the EKG/Telemetry strips   ----------------------------------------------------------------------------------------------------------------------  Results from last 7 days   Lab Units 06/05/21 1816   TROPONIN T ng/mL <0.010     Results from last 7 days   Lab Units 06/05/21 1816   PROBNP pg/mL 350.9         Results from last 7 days   Lab Units 06/06/21 0155 06/05/21 1816 06/05/21 1815   CRP mg/dL 12.11* 9.25*  --    LACTATE mmol/L  --   --  1.9   WBC 10*3/mm3 17.54*  --  19.66*   HEMOGLOBIN g/dL 12.6  --  14.5   HEMATOCRIT % 38.6  --  44.0   MCV fL 86.0  --  84.0   MCHC g/dL 32.6  --  33.0   PLATELETS 10*3/mm3 264  --  346     Results from last 7 days   Lab Units 06/06/21 0155 06/05/21 1816   SODIUM mmol/L 135* 132*   POTASSIUM mmol/L 3.3* 3.8   MAGNESIUM mg/dL 1.8  --    CHLORIDE mmol/L 103 97*   CO2 mmol/L 18.7* 18.8*   BUN mg/dL 10 10   CREATININE mg/dL 0.98 0.98   EGFR IF NONAFRICN AM mL/min/1.73 65 65   CALCIUM mg/dL 8.6 9.8   GLUCOSE mg/dL 119* 145*   ALBUMIN g/dL 3.39* 4.36   BILIRUBIN mg/dL 0.2 0.5   ALK PHOS U/L 80 101   AST (SGOT) U/L 12 16   ALT (SGPT) U/L 11 14   Estimated Creatinine Clearance: 83 mL/min (by C-G formula based on SCr of 0.98 mg/dL).    Lab Results   Component Value Date    HGBA1C 5.60 06/05/2021     Lab Results   Component Value Date    TSH 0.485 06/05/2021     Microbiology Results (last 10 days)     Procedure Component Value - Date/Time    COVID-19 and FLU A/B PCR - Swab, Nasopharynx  [[id number]]  (Normal) Collected: 06/05/21 2043    Lab Status: Final result Specimen: Swab from Nasopharynx Updated: 06/05/21 2107     COVID19 Not Detected     Influenza A PCR Not Detected     Influenza B PCR Not Detected    Narrative:      Fact sheet for providers: https://www.fda.gov/media/276541/download    Fact sheet for patients: https://www.fda.gov/media/182613/download    Test performed by PCR.         Pain Management Panel     Pain Management Panel Latest Ref Rng & Units 6/27/2020 6/21/2020    AMPHETAMINES SCREEN, URINE Negative Negative Positive(A)    BARBITURATES SCREEN Negative Negative Negative    BENZODIAZEPINE SCREEN, URINE Negative Negative Negative    BUPRENORPHINEUR Negative Negative Negative    COCAINE SCREEN, URINE Negative Negative Negative    METHADONE SCREEN, URINE Negative Negative Negative            I have personally reviewed the above laboratory results.   ----------------------------------------------------------------------------------------------------------------------  Imaging Results (Last 24 Hours)     Procedure Component Value Units Date/Time    CT Abdomen Pelvis With Contrast [[id number]] Collected: 06/05/21 1939     Updated: 06/05/21 1941    Narrative:      FINDINGS:  Included lung bases are clear.  Abdomen: The liver shows normal enhancement. The gallbladder appears unremarkable. The kidneys show normal enhancement. 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 No hydronephrosis. No perinephric stranding.  The spleen and pancreas appear unremarkable. No adrenal abnormalities. No threshold retroperitoneal adenopathy.  Pelvis: The bowel pattern demonstrates multiple fluid-filled and minimally dilated small bowel loops. The pattern suggest an enteritis, either infectious or inflammatory. The colon appears unremarkable. There is a small amount of free fluid demonstrated  within the cul-de-sac. No free air is identified. The urinary bladder appears unremarkable. The uterus is anteflexed.  No  threshold pelvic or inguinal adenopathy. Regional osseous structures show no acute or aggressive bone lesions.    Impression:      1.  The small bowel demonstrate multiple fluid-filled and mildly dilated loops of small bowel with enhancement of the small bowel wall. The findings suggest an enteritis, either infectious or inflammatory.  2.  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 No hydronephrosis.  3.  Small amount of free fluid demonstrated in the dependent portion of the pelvis.    Signer Name: Memo Chavez MD   Signed: 6/5/2021 7:39 PM   Workstation Name: [facility]    [facility]    XR Chest 1 View [[id number]] Collected: 06/05/21 1919     Updated: 06/05/21 1921    Narrative:      FINDINGS:  Single portable AP view(s) of the chest.  The heart and mediastinal contours are normal. The lungs are clear. No pneumothorax or pleural effusion.      Impression:      No acute cardiopulmonary findings.    Signer Name: Memo Chavez MD   Signed: 6/5/2021 7:19 PM   Workstation Name: [facility]    [facility]      I have personally reviewed the above radiology results.   ----------------------------------------------------------------------------------------------------------------------  Assessment/Plan     -Sepsis, present on admission, secondary to acute urinary tract infection with concern for left sided pyelonephritis with CVA tenderness  -Horseshoe kidney  • CT without perinephric stranding/hydronephrotis/hydroureter, but symptomatic with left CVA tenderness of exam, treat for pyelo  • Blood and urine cultures pending, follow for final results   • CRP slightly increased, WBC improved   • Patient with low grade fever overnight, remains slightly tachycardic   • Continue empiric coverage with high dose IV Rocephin   • Lactobacillus for gut pierre protection   • PRN antipyretics for fever, monitor temperature curve closely    • IV fluids on board   • PRN pain medication with  holding parameters   • Repeat CBC and CRP in AM    -Enteritis   · CT of abdomen with multiple fluid filled and minimally dilated loops of small bowel with enhancement of small bowel wall, suggesting enteritis.  · No current diarrhea  · Provide supportive care fore now   · Should patient develop further diarrhea, will send GI panel   · PRN antiemetics, gentle IV fluids     -Hypokalemia   -Hypomagnesemia   -Mild QTc prolongation, 469 m/s   · Replace electrolytes per protocol   · Avoid any further QTc prolonging agents as much as possible   · Telemetry monitoring   · Repeat chem panel, mag level, and ECG in AM    -Mild hyponatremia   · Improved   · Continue IVF   · Monitor closely, repeat chem panel in AM    -Mild hyperglycemia without hx of diabetes mellitus  • HgbA1C 5.6, no further work up necessary     -Essential hypertension   · BP initially labile on presentation with improved control   · Hold home lisinopril currently in setting of sepsis, UTI  · IV fluids on board  · Closely monitor BP per hospital protocol, titrate medications as necessary   · PRN hydralazine IV available     -Chronic hepatitis C   · LFTs stable   · Monitor closely   · Encourage outpatient GI follow up for possible treatment   · Urine drug screen added to specimen in lab    -Anxiety   -Depression   · Supportive care     -Tobacco abuse   · Encourage cessation   · NRT replacement available     -Hypoalbuminemia   · Multifactorial   · Monitor closely     -Obesity by BMI, 32.77    --------------------------------------------------  DVT Prophylaxis: SQ lovenox   GI Prophylaxis: PPI   FEN: Gentle IV fluids. Replace electrolytes per protocol as necessary. Regular diet.  Activity: As tolerated   --------------------------------------------------  Disposition:  • Plans on home at discharge   • Pending urine culture finalization, remains on IV antibiotics   --------------------------------------------------    The patient is high risk due to the  following diagnoses/reasons: Sepsis, UTI, pyelonephritis, electrolyte abnormalities, QTc prolongation, HTN, hepatitis C, ongoing tobacco abuse, obesity     I have discussed the patient's assessment and plan with the patient, AM RN Shelley, and attending physician Alex Szymanski MD Allyson O'Kuma, PA-C  Hospitalist Service -- [facility]   Pager: [phone]    06/06/21  08:09 EDT    Attending Physician: Alex Can MD    ----------------------------------------------------------------------------------------------------------------------

## 2021-06-06 NOTE — PLAN OF CARE
Goal Outcome Evaluation:  Plan of Care Reviewed With: patient  Progress: no change     Pt running temp of 100.1 since admitted to floor. Gave PRN morphine and toradol x1. She has an order for a K-pad, but did not apply it since she is febrile. Will apply if temp goes down.

## 2021-06-06 NOTE — PLAN OF CARE
Goal Outcome Evaluation:         Patient remains with temp currently at 101.1, medication administered for fever.  Pain medications given.  No other issues noted.

## 2021-06-06 NOTE — H&P
[facility] Medicine Services  HISTORY & PHYSICAL    Patient Identification:  Name:  Kacy Woo  Age:  35 y.o.  Sex:  female  :  1985  MRN:  [id number]   Visit Number:  [id number]  Admit Date: 2021   Primary Care Physician:  Provider, No Known     Subjective     Chief complaint:   Chief Complaint   Patient presents with   • Fever   • Dysuria   • Generalized Body Aches     History of presenting illness:   Patient is a 35 y.o. female with past medical history significant for essential hypertension, hepatitis C, anxiety, depression, that presented to the [facility] emergency department for evaluation of fever, chills, dysuria    The patient reports that around 1 week ago she began experiencing difficulty with urination, dysuria, hematuria, and cloudy urine.  She states that her symptoms have progressively worsened since this time and she developed chills, nausea, diarrhea x2 weeks (last episode Thursday), decreased appetite, myalgias and a fever of 101 °F that resolved with Motrin.  In addition, she has been experiencing lower abdominal pain described as a soreness, as well as left CVA tenderness for the past several days.  She denies any congestion, sore throat, coughing, wheezing, shortness of breath, chest pain, hematochezia/melena, emesis, vaginal discharge, wounds, dizziness, lightheadedness, syncope.  She further denies any recent antibiotics.    Upon arrival to the ED, vitals were temperature 98.6 °F, pulse 129, respirations 22, /99, SPO2 98% on room air.  Troponin T negative x1.  CMP with glucose 145, sodium 132, CO2 18.8, chloride 97, anion gap 16.2.  CRP 9.25.  CBC with WBC 19.66.  UA with cloudy appearance, 3+ blood, positive nitrites, 2+ leukocytes, 2+ protein, 31-50 RBC, too numerous to count WBC, 3+ bacteria, 0-2 squamous epithelia.  Blood cultures pending x2.  Urine culture pending.  Chest x-ray shows no acute cardiopulmonary findings.  CT of the  abdomen/pelvis with contrast shows multiple fluid-filled and mildly dilated loops of small bowel with enhancement of the small bowel wall, findings suggestive of enteritis, redemonstrated horseshoe kidney, no hydronephrosis, small amount of free fluid in the dependent portion of the pelvis.  EKG with sinus tachycardia, mild T wave inversion in lead III, heart rate 112 bpm, QTc 469 not intense.  COVID-19 negative.    In the emergency department the patient received IV Rocephin 2 g, IV Toradol 15 mg, IV morphine 4 mg, IV Zofran 4 mg, IV normal saline 1 L bolus.    Patient has been admitted to the medical/surgical floor for further evaluation and treatment   ---------------------------------------------------------------------------------------------------------------------   Review of Systems   Constitutional: Positive for appetite change, chills, diaphoresis and fever (101 F ).   HENT: Negative for congestion and sore throat.    Eyes: Negative for discharge and visual disturbance.   Respiratory: Negative for cough, shortness of breath and wheezing.    Cardiovascular: Negative for chest pain, palpitations and leg swelling.   Gastrointestinal: Positive for abdominal pain (lower abdominal pain ), diarrhea (x 2 weeks; last episode thursday ) and nausea. Negative for constipation and vomiting.   Genitourinary: Positive for difficulty urinating, dysuria, flank pain (left) and hematuria. Negative for frequency, vaginal bleeding and vaginal discharge.   Musculoskeletal: Positive for myalgias. Negative for gait problem.   Skin: Negative for rash and wound.   Neurological: Negative for dizziness, syncope and light-headedness.   Psychiatric/Behavioral: Negative for confusion. The patient is not nervous/anxious.       ---------------------------------------------------------------------------------------------------------------------   Past Medical History:   Diagnosis Date   • Anxiety    • Depression    • Hepatitis C    •  Horseshoe kidney    • Hypertension    • PID (pelvic inflammatory disease)      Past Surgical History:   Procedure Laterality Date   •  SECTION     •  SECTION N/A 2020    Procedure:  SECTION REPEAT;  Surgeon: Sonya Barton DO;  Location: [facility] LABOR DELIVERY;  Service: Obstetrics/Gynecology;  Laterality: N/A;     Family History   Problem Relation Age of Onset   • Alcohol abuse Father    • Depression Father    • Hypertension Father    • Stroke Father    • COPD Mother    • Diabetes Mother    • Hypertension Mother    • Alcohol abuse Paternal Grandfather    • Kidney disease Paternal Grandfather    • Stroke Paternal Grandfather    • Kidney cancer Paternal Grandmother    • Coronary artery disease Paternal Grandmother    • Hypertension Paternal Grandmother    • Asthma Maternal Grandmother    • Kidney cancer Maternal Grandmother    • Depression Maternal Grandmother    • Diabetes Maternal Grandmother    • Hypertension Maternal Grandmother    • Kidney disease Maternal Grandmother    • Alcohol abuse Maternal Grandfather    • Lung cancer Maternal Grandfather      Social History     Socioeconomic History   • Marital status: Legally      Spouse name: Not on file   • Number of children: Not on file   • Years of education: Not on file   • Highest education level: Not on file   Tobacco Use   • Smoking status: Current Every Day Smoker     Packs/day: 2.00   • Smokeless tobacco: Never Used   Substance and Sexual Activity   • Alcohol use: No   • Drug use: Yes     Types: Methamphetamines, Oxycodone, Amphetamines, Marijuana, Benzodiazepines     Comment: suboxone, neurontin   • Sexual activity: Yes     ---------------------------------------------------------------------------------------------------------------------   Allergies:  Patient has no known allergies.  ---------------------------------------------------------------------------------------------------------------------    Medications below are reported home medications pulling from within the system; at this time, these medications have not been reconciled unless otherwise specified and are in the verification process for further verifcation as current home medications.    Prior to Admission Medications     Prescriptions Last Dose Informant Patient Reported? Taking?    cetirizine (zyrTEC) 10 MG tablet Past Week  Yes Yes    Take 10 mg by mouth Daily.    lisinopril (PRINIVIL,ZESTRIL) 30 MG tablet Past Week  Yes Yes    Take 1 tablet by mouth Daily.    sertraline (ZOLOFT) 50 MG tablet   Yes No    50 mg.        ---------------------------------------------------------------------------------------------------------------------    Objective     Hospital Scheduled Meds:  cefTRIAXone, 2 g, Intravenous, Q24H  sodium chloride, 10 mL, Intravenous, Q12H      sodium chloride, 125 mL/hr, Last Rate: 125 mL/hr (06/05/21 2234)        Current listed hospital scheduled medications may not yet reflect those currently placed in orders that are signed and held, awaiting patient's arrival to floor/unit.    ---------------------------------------------------------------------------------------------------------------------   Vital Signs:  Temp:  [98.6 °F (37 °C)-100.2 °F (37.9 °C)] 100.2 °F (37.9 °C)  Heart Rate:  [101-129] 108  Resp:  [20-24] 24  BP: (136-158)/() 158/96  Mean Arterial Pressure (Non-Invasive) for the past 24 hrs (Last 3 readings):   Noninvasive MAP (mmHg)   06/05/21 2047 108   06/05/21 1902 128   06/05/21 1847 119     SpO2 Percentage    06/05/21 2053 06/05/21 2054 06/05/21 2215   SpO2: 99% 98% 97%     SpO2:  [97 %-100 %] 97 %  on   ;   Device (Oxygen Therapy): room air    Body mass index is 32.77 kg/m².  Wt Readings from Last 3 Encounters:   06/05/21 83.9 kg (185 lb)   09/30/20 76.2 kg (168 lb)   09/08/20 71.7 kg (158 lb)      ---------------------------------------------------------------------------------------------------------------------   Physical Exam:  Physical Exam  Constitutional:       General: She is awake. She is not in acute distress.     Appearance: Normal appearance. She is well-developed. She is obese.   HENT:      Head: Normocephalic and atraumatic.   Eyes:      General: Lids are normal.         Right eye: No discharge.         Left eye: No discharge.   Cardiovascular:      Rate and Rhythm: Normal rate and regular rhythm.      Pulses: Normal pulses. No decreased pulses.           Dorsalis pedis pulses are 2+ on the right side and 2+ on the left side.        Posterior tibial pulses are 2+ on the right side and 2+ on the left side.      Heart sounds: Normal heart sounds. No murmur heard.   No friction rub. No gallop.    Pulmonary:      Effort: Pulmonary effort is normal. No tachypnea or bradypnea.      Breath sounds: Normal breath sounds. No decreased breath sounds, wheezing, rhonchi or rales.   Abdominal:      General: Bowel sounds are normal.      Palpations: Abdomen is soft.      Tenderness: There is generalized abdominal tenderness (generalized tenderness but worse in the suprapubic region). There is left CVA tenderness. There is no guarding.   Musculoskeletal:      Right lower leg: No edema.      Left lower leg: No edema.   Skin:     Findings: No abrasion, ecchymosis or erythema.   Neurological:      General: No focal deficit present.      Mental Status: She is alert and oriented to person, place, and time. Mental status is at baseline.   Psychiatric:         Speech: Speech normal.         Behavior: Behavior is cooperative.         Cognition and Memory: Cognition normal.       ---------------------------------------------------------------------------------------------------------------------  EKG:    Pending cardiology read. Per my evaluation, sinus tachycardia, T wave inversion in lead III, , QTc 469 ms      Telemetry:    Not currently on telemetry     I have personally reviewed the EKG  ---------------------------------------------------------------------------------------------------------------------   Results from last 7 days   Lab Units 06/05/21  1816   TROPONIN T ng/mL <0.010     Results from last 7 days   Lab Units 06/05/21  1816   PROBNP pg/mL 350.9         Results from last 7 days   Lab Units 06/05/21  1816 06/05/21  1815   CRP mg/dL 9.25*  --    LACTATE mmol/L  --  1.9   WBC 10*3/mm3  --  19.66*   HEMOGLOBIN g/dL  --  14.5   HEMATOCRIT %  --  44.0   MCV fL  --  84.0   MCHC g/dL  --  33.0   PLATELETS 10*3/mm3  --  346     Results from last 7 days   Lab Units 06/05/21  1816   SODIUM mmol/L 132*   POTASSIUM mmol/L 3.8   CHLORIDE mmol/L 97*   CO2 mmol/L 18.8*   BUN mg/dL 10   CREATININE mg/dL 0.98   EGFR IF NONAFRICN AM mL/min/1.73 65   CALCIUM mg/dL 9.8   GLUCOSE mg/dL 145*   ALBUMIN g/dL 4.36   BILIRUBIN mg/dL 0.5   ALK PHOS U/L 101   AST (SGOT) U/L 16   ALT (SGPT) U/L 14   Estimated Creatinine Clearance: 83 mL/min (by C-G formula based on SCr of 0.98 mg/dL).    Pain Management Panel     Pain Management Panel Latest Ref Rng & Units 6/27/2020 6/21/2020    AMPHETAMINES SCREEN, URINE Negative Negative Positive(A)    BARBITURATES SCREEN Negative Negative Negative    BENZODIAZEPINE SCREEN, URINE Negative Negative Negative    BUPRENORPHINEUR Negative Negative Negative    COCAINE SCREEN, URINE Negative Negative Negative    METHADONE SCREEN, URINE Negative Negative Negative        I have personally reviewed the above laboratory results.   ---------------------------------------------------------------------------------------------------------------------  Imaging Results (Last 7 Days)     Procedure Component Value Units Date/Time    CT Abdomen Pelvis With Contrast [[id number]] Collected: 06/05/21 1939     Updated: 06/05/21 1941    Narrative:      CT Abdomen Pelvis W    INDICATION:   One week history of abdominal pain  and low back pain with difficulty urinating.    TECHNIQUE:   CT of the abdomen and pelvis with contrast. Coronal and sagittal reconstructions were obtained.  Radiation dose reduction techniques included automated exposure control or exposure modulation based on body size. Radiation audit for number of CT and  nuclear cardiology exams performed in the last year: 1.      COMPARISON:   9/16/2020    FINDINGS:  Included lung bases are clear.    Abdomen: The liver shows normal enhancement. The gallbladder appears unremarkable. The kidneys show normal enhancement. 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 No hydronephrosis. No perinephric stranding.    The spleen and pancreas appear unremarkable. No adrenal abnormalities. No threshold retroperitoneal adenopathy.    Pelvis: The bowel pattern demonstrates multiple fluid-filled and minimally dilated small bowel loops. The pattern suggest an enteritis, either infectious or inflammatory. The colon appears unremarkable. There is a small amount of free fluid demonstrated  within the cul-de-sac. No free air is identified. The urinary bladder appears unremarkable. The uterus is anteflexed.    No threshold pelvic or inguinal adenopathy. Regional osseous structures show no acute or aggressive bone lesions.      Impression:        1.  The small bowel demonstrate multiple fluid-filled and mildly dilated loops of small bowel with enhancement of the small bowel wall. The findings suggest an enteritis, either infectious or inflammatory.    2.  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 No hydronephrosis.    3.  Small amount of free fluid demonstrated in the dependent portion of the pelvis.          Signer Name: Memo Cahvez MD   Signed: 6/5/2021 7:39 PM   Workstation Name: [id number]    [facility]    XR Chest 1 View [[id number]] Collected: 06/05/21 1919     Updated: 06/05/21 1921    Narrative:      CR Chest 1 Vw    INDICATION:   Acute onset of fever and dysuria      COMPARISON:    9/15/2016    FINDINGS:  Single portable AP view(s) of the chest.  The heart and mediastinal contours are normal. The lungs are clear. No pneumothorax or pleural effusion.      Impression:      No acute cardiopulmonary findings.    Signer Name: Memo Chavez MD   Signed: 6/5/2021 7:19 PM   Workstation Name: [id number]    [facility]        I have personally reviewed the above radiology results.     ---------------------------------------------------------------------------------------------------------------------    Assessment & Plan      Active Hospital Problems    Diagnosis  POA   • Pyelonephritis [N12]  Yes     #Sepsis POA (pulse 129, RR 24, CrP 9.25, WBC 19.66) 2/2 to left sided pyelonephritis   #Acute UTI POA   #Proteinuria  #Hematuria   #Elevated anion gap   -CT of the abdomen/pelvis with contrast shows a horseshoe kidney, no perinephric stranding, however, patient with severe left CVA tenderness   -UA shows 3+ blood, positive nitrites, 2+ leukocytes, 2+ protein, 31-50 RBC, few numerous to count WBC, 3+ bacteria, 0-2 squamous epithelia  -Patient received IV Rocephin 2 g in the emergency department; continue with the symptomatic's on the floor  -Lactobacillus for GI protection  -Urine and blood cultures pending  -IV Toradol 30 mg every 6 hours and IV morphine 2 mg every 4 hours as needed for pain  -Repeat a.m. CMP, CBC, and CRP  -Tylenol as needed for fever  -Continue monitor closely    #?  Enteritis  -No diarrhea since Thursday; took an antidiarreal   -CT of the abdomen/pelvis with contrast revealed multiple fluid-filled and mildly dilated loops of small bowel with enhancement of the small bowel wall that was concerning for enteritis  -Patient denies any sick contacts, recent antibiotics, or emesis  -We will hold on obtaining GI panel for now; should diarrhea recur, will order   -Supportive care  -IV  mL/h  -IV Compazine q6 hours PRN for N/V      #Hyponatremia  -Corrected sodium for hyperglycemia is 133  -IV normal saline 125 mL/h  -Repeat a.m. CMP    #Hyperglycemia with no known history of diabetes mellitus  -Obtain hemoglobin A1c    #Prolonged QTc (469 ms)   -Obtain magnesium level; replace as necessary  -Avoid QTC prolonging agents as much possible    #Horshoe kidney   -Noted on CT of abdomen/pelvis  -Renal function is within normal limits    #Hepatitis C   -Liver enzymes are within normal limits  -Patient states she is about to start Mavyret   -Repeat AM CMP and monitor liver function closely     #Essential hypertension   -Plan to resume home antihypertensive agents as appropriate with holding parameters  -We will make IV hydralazine available as needed for SBP greater than 170  -Continue monitor VS per hospital protocol    #Anxiety   #Depression   -Supportive care  -Reviewed home medications once reconciled per pharmacy    #Smoking tobacco use  -Smoking cessation counseling given  -Denies wanting NRT at this time    #Obesity   -BMI 32.77 kg/m2   -Complicates all aspects of patient care     F/E/N: IV normal saline 125 mL/h.  Replace electrolytes as necessary.  Regular diet.    ---------------------------------------------------  DVT Prophylaxis: SCDS  GI Prophylaxis: Protonix   Activity: up with assistance, fall precautions     The patient is considered to be a high risk patient due to: Sepsis 2/2 to left-sided pyelonephritis; acute UTI POA; hyponatremia    INPATIENT status due to the need for care which can only be reasonably provided in an hospital setting such as aggressive/expedited ancillary services and/or consultation services, the necessity for IV medications, close physician monitoring and/or the possible need for procedures.  In such, I feel patient’s risk for adverse outcomes and need for care warrant INPATIENT evaluation and predict the patient’s care encounter to likely last beyond 2 midnights.    Code Status: FULL CODE     I have  discussed the patient's assessment and plan with the patient, nursing staff, and attending physician      Rosmery Shah PA-C  Hospitalist Service -- [facility]       06/05/21  22:45 EDT    Attending Physician: Nadja Duckworth DO

## 2021-06-07 LAB
ALBUMIN SERPL-MCNC: 2.79 G/DL (ref 3.5–5.2)
ALBUMIN/GLOB SERPL: 0.9 G/DL
ALP SERPL-CCNC: 74 U/L (ref 39–117)
ALT SERPL W P-5'-P-CCNC: 12 U/L (ref 1–33)
ANION GAP SERPL CALCULATED.3IONS-SCNC: 8.7 MMOL/L (ref 5–15)
AST SERPL-CCNC: 14 U/L (ref 1–32)
BACTERIA SPEC AEROBE CULT: ABNORMAL
BASOPHILS # BLD AUTO: 0.04 10*3/MM3 (ref 0–0.2)
BASOPHILS NFR BLD AUTO: 0.3 % (ref 0–1.5)
BILIRUB SERPL-MCNC: <0.2 MG/DL (ref 0–1.2)
BUN SERPL-MCNC: 8 MG/DL (ref 6–20)
BUN/CREAT SERPL: 10.5 (ref 7–25)
CALCIUM SPEC-SCNC: 8.5 MG/DL (ref 8.6–10.5)
CHLORIDE SERPL-SCNC: 109 MMOL/L (ref 98–107)
CO2 SERPL-SCNC: 19.3 MMOL/L (ref 22–29)
CREAT SERPL-MCNC: 0.76 MG/DL (ref 0.57–1)
CRP SERPL-MCNC: 17.13 MG/DL (ref 0–0.5)
DEPRECATED RDW RBC AUTO: 42.6 FL (ref 37–54)
EOSINOPHIL # BLD AUTO: 0.12 10*3/MM3 (ref 0–0.4)
EOSINOPHIL NFR BLD AUTO: 0.9 % (ref 0.3–6.2)
ERYTHROCYTE [DISTWIDTH] IN BLOOD BY AUTOMATED COUNT: 13.4 % (ref 12.3–15.4)
GFR SERPL CREATININE-BSD FRML MDRD: 87 ML/MIN/1.73
GLOBULIN UR ELPH-MCNC: 3.1 GM/DL
GLUCOSE SERPL-MCNC: 135 MG/DL (ref 65–99)
HCT VFR BLD AUTO: 33.2 % (ref 34–46.6)
HGB BLD-MCNC: 10.7 G/DL (ref 12–15.9)
IMM GRANULOCYTES # BLD AUTO: 0.05 10*3/MM3 (ref 0–0.05)
IMM GRANULOCYTES NFR BLD AUTO: 0.4 % (ref 0–0.5)
LYMPHOCYTES # BLD AUTO: 2.1 10*3/MM3 (ref 0.7–3.1)
LYMPHOCYTES NFR BLD AUTO: 15.9 % (ref 19.6–45.3)
MAGNESIUM SERPL-MCNC: 2.1 MG/DL (ref 1.6–2.6)
MCH RBC QN AUTO: 28.1 PG (ref 26.6–33)
MCHC RBC AUTO-ENTMCNC: 32.2 G/DL (ref 31.5–35.7)
MCV RBC AUTO: 87.1 FL (ref 79–97)
MONOCYTES # BLD AUTO: 1.23 10*3/MM3 (ref 0.1–0.9)
MONOCYTES NFR BLD AUTO: 9.3 % (ref 5–12)
NEUTROPHILS NFR BLD AUTO: 73.2 % (ref 42.7–76)
NEUTROPHILS NFR BLD AUTO: 9.69 10*3/MM3 (ref 1.7–7)
NRBC BLD AUTO-RTO: 0 /100 WBC (ref 0–0.2)
PHOSPHATE SERPL-MCNC: 2.5 MG/DL (ref 2.5–4.5)
PLATELET # BLD AUTO: 213 10*3/MM3 (ref 140–450)
PMV BLD AUTO: 10.7 FL (ref 6–12)
POTASSIUM SERPL-SCNC: 4.5 MMOL/L (ref 3.5–5.2)
PROT SERPL-MCNC: 5.9 G/DL (ref 6–8.5)
QT INTERVAL: 432 MS
QT INTERVAL: 432 MS
QTC INTERVAL: 469 MS
QTC INTERVAL: 475 MS
RBC # BLD AUTO: 3.81 10*6/MM3 (ref 3.77–5.28)
SODIUM SERPL-SCNC: 137 MMOL/L (ref 136–145)
WBC # BLD AUTO: 13.23 10*3/MM3 (ref 3.4–10.8)

## 2021-06-07 PROCEDURE — 83735 ASSAY OF MAGNESIUM: CPT | Performed by: PHYSICIAN ASSISTANT

## 2021-06-07 PROCEDURE — 99233 SBSQ HOSP IP/OBS HIGH 50: CPT | Performed by: PHYSICIAN ASSISTANT

## 2021-06-07 PROCEDURE — 93010 ELECTROCARDIOGRAM REPORT: CPT | Performed by: INTERNAL MEDICINE

## 2021-06-07 PROCEDURE — 25010000002 ENOXAPARIN PER 10 MG: Performed by: INTERNAL MEDICINE

## 2021-06-07 PROCEDURE — 85025 COMPLETE CBC W/AUTO DIFF WBC: CPT | Performed by: PHYSICIAN ASSISTANT

## 2021-06-07 PROCEDURE — 86140 C-REACTIVE PROTEIN: CPT | Performed by: PHYSICIAN ASSISTANT

## 2021-06-07 PROCEDURE — 80053 COMPREHEN METABOLIC PANEL: CPT | Performed by: PHYSICIAN ASSISTANT

## 2021-06-07 PROCEDURE — 93005 ELECTROCARDIOGRAM TRACING: CPT | Performed by: PHYSICIAN ASSISTANT

## 2021-06-07 PROCEDURE — 93005 ELECTROCARDIOGRAM TRACING: CPT | Performed by: INTERNAL MEDICINE

## 2021-06-07 PROCEDURE — 25010000002 MORPHINE PER 10 MG: Performed by: INTERNAL MEDICINE

## 2021-06-07 PROCEDURE — 84100 ASSAY OF PHOSPHORUS: CPT | Performed by: PHYSICIAN ASSISTANT

## 2021-06-07 PROCEDURE — 63710000001 PROMETHAZINE PER 12.5 MG: Performed by: PHYSICIAN ASSISTANT

## 2021-06-07 PROCEDURE — 25010000002 CEFTRIAXONE PER 250 MG: Performed by: INTERNAL MEDICINE

## 2021-06-07 PROCEDURE — 94799 UNLISTED PULMONARY SVC/PX: CPT

## 2021-06-07 PROCEDURE — 25010000002 KETOROLAC TROMETHAMINE PER 15 MG: Performed by: INTERNAL MEDICINE

## 2021-06-07 RX ORDER — POLYETHYLENE GLYCOL 3350 17 G/17G
17 POWDER, FOR SOLUTION ORAL DAILY PRN
Status: DISCONTINUED | OUTPATIENT
Start: 2021-06-07 | End: 2021-06-08 | Stop reason: HOSPADM

## 2021-06-07 RX ORDER — DOCOSANOL 100 MG/G
CREAM TOPICAL
Status: DISCONTINUED | OUTPATIENT
Start: 2021-06-07 | End: 2021-06-08 | Stop reason: HOSPADM

## 2021-06-07 RX ORDER — PHENAZOPYRIDINE HYDROCHLORIDE 100 MG/1
100 TABLET, FILM COATED ORAL
Status: DISCONTINUED | OUTPATIENT
Start: 2021-06-07 | End: 2021-06-08 | Stop reason: HOSPADM

## 2021-06-07 RX ORDER — LIDOCAINE 50 MG/G
1 PATCH TOPICAL
Status: DISCONTINUED | OUTPATIENT
Start: 2021-06-07 | End: 2021-06-08 | Stop reason: HOSPADM

## 2021-06-07 RX ORDER — PROMETHAZINE HYDROCHLORIDE 12.5 MG/1
12.5 TABLET ORAL ONCE
Status: COMPLETED | OUTPATIENT
Start: 2021-06-07 | End: 2021-06-07

## 2021-06-07 RX ORDER — SUMATRIPTAN 50 MG/1
50 TABLET, FILM COATED ORAL
Status: DISCONTINUED | OUTPATIENT
Start: 2021-06-07 | End: 2021-06-08 | Stop reason: HOSPADM

## 2021-06-07 RX ADMIN — PHENAZOPYRIDINE HYDROCHLORIDE 100 MG: 100 TABLET ORAL at 09:57

## 2021-06-07 RX ADMIN — KETOROLAC TROMETHAMINE 30 MG: 30 INJECTION, SOLUTION INTRAMUSCULAR; INTRAVENOUS at 23:58

## 2021-06-07 RX ADMIN — MORPHINE SULFATE 2 MG: 2 INJECTION, SOLUTION INTRAMUSCULAR; INTRAVENOUS at 22:14

## 2021-06-07 RX ADMIN — SODIUM CHLORIDE 125 ML/HR: 9 INJECTION, SOLUTION INTRAVENOUS at 17:11

## 2021-06-07 RX ADMIN — ACETAMINOPHEN 650 MG: 325 TABLET ORAL at 20:39

## 2021-06-07 RX ADMIN — CEFTRIAXONE 2 G: 2 INJECTION, POWDER, FOR SOLUTION INTRAMUSCULAR; INTRAVENOUS at 22:15

## 2021-06-07 RX ADMIN — NICOTINE 1 PATCH: 14 PATCH, EXTENDED RELEASE TRANSDERMAL at 08:18

## 2021-06-07 RX ADMIN — POLYETHYLENE GLYCOL (3350) 17 G: 17 POWDER, FOR SOLUTION ORAL at 09:58

## 2021-06-07 RX ADMIN — PHENAZOPYRIDINE HYDROCHLORIDE 100 MG: 100 TABLET ORAL at 17:11

## 2021-06-07 RX ADMIN — SODIUM CHLORIDE 125 ML/HR: 9 INJECTION, SOLUTION INTRAVENOUS at 08:28

## 2021-06-07 RX ADMIN — MORPHINE SULFATE 2 MG: 2 INJECTION, SOLUTION INTRAMUSCULAR; INTRAVENOUS at 12:13

## 2021-06-07 RX ADMIN — MORPHINE SULFATE 2 MG: 2 INJECTION, SOLUTION INTRAMUSCULAR; INTRAVENOUS at 05:32

## 2021-06-07 RX ADMIN — LIDOCAINE 1 PATCH: 50 PATCH CUTANEOUS at 09:58

## 2021-06-07 RX ADMIN — SERTRALINE 50 MG: 50 TABLET, FILM COATED ORAL at 12:13

## 2021-06-07 RX ADMIN — DOCOSANOL: 100 CREAM TOPICAL at 17:11

## 2021-06-07 RX ADMIN — PROMETHAZINE HYDROCHLORIDE 12.5 MG: 12.5 TABLET ORAL at 22:04

## 2021-06-07 RX ADMIN — PANTOPRAZOLE SODIUM 40 MG: 40 TABLET, DELAYED RELEASE ORAL at 05:29

## 2021-06-07 RX ADMIN — DOCOSANOL: 100 CREAM TOPICAL at 22:38

## 2021-06-07 RX ADMIN — Medication 1 CAPSULE: at 08:18

## 2021-06-07 RX ADMIN — IBUPROFEN 600 MG: 600 TABLET, FILM COATED ORAL at 08:19

## 2021-06-07 RX ADMIN — KETOROLAC TROMETHAMINE 30 MG: 30 INJECTION, SOLUTION INTRAMUSCULAR; INTRAVENOUS at 15:08

## 2021-06-07 RX ADMIN — ENOXAPARIN SODIUM 40 MG: 40 INJECTION SUBCUTANEOUS at 08:18

## 2021-06-07 RX ADMIN — KETOROLAC TROMETHAMINE 30 MG: 30 INJECTION, SOLUTION INTRAMUSCULAR; INTRAVENOUS at 03:04

## 2021-06-07 RX ADMIN — DOCOSANOL: 100 CREAM TOPICAL at 12:13

## 2021-06-07 RX ADMIN — PHENAZOPYRIDINE HYDROCHLORIDE 100 MG: 100 TABLET ORAL at 12:13

## 2021-06-07 RX ADMIN — SUMATRIPTAN SUCCINATE 50 MG: 50 TABLET, FILM COATED ORAL at 18:16

## 2021-06-07 NOTE — PLAN OF CARE
Goal Outcome Evaluation:  Plan of Care Reviewed With: patient        Progress: no change  Outcome Summary: Pt continues to have flank pain and dysuria. PRN pyridium and pain meds given. Pt reports some improvment with those meds. Afebrile. WCTM.

## 2021-06-07 NOTE — PROGRESS NOTES
[facility] Medicine Services  PROGRESS NOTE     Patient Identification:  Name:  Kacy Woo  Age:  35 y.o.  Sex:  female  :  1985  MRN:  [id number]  Visit Number:  [id number]  Primary Care Provider:  Provider, No Known    Length of stay:  2    ----------------------------------------------------------------------------------------------------------------------  Subjective     Chief Complaint:  Follow up for sepsis, UTI, pyelonephritis, electrolyte abnormalities, QTc prolongation    History of Presenting Illness/Hospital Course:  Patient is a 39 yo female with past medical history significant for essential hypertension, chronic hepatitis C, anxiety/depression, and ongoing tobacco abuse that presented to the [facility] emergency department on 2021 for evaluation of fever, chills, and dysuria. Please see admitting history and physical for further details. Patient was found to have sepsis, present on admission, secondary to acute UTI with probable left pyelonephritis with CVA tenderness, CT without perinephric stranding/hydroureter/hydronephrosis. Blood cultures pending with NGTD. Urine culture with preliminary results of E. Coli. Patient empirically treated with high dose IV rocephin. Gentle IV fluids continued with PRN antipyretics and antiemetics. Electrolytes replaced per protocol.     Subjective:  Today, the patient was on a bed for my evaluation this morning, appears to feel unwell but in no acute distress nontoxic.  No family present bedside.  No overnight events reported per AM SILVERIO Yarbrough.  No fever overnight.  Patient reports to feel unwell, but states she is overall improved from time of admission.  She reports urinary frequency and hesitancy, she reports significant dysuria.  She continues to report left flank pain and suprapubic pain, slightly improved.  She does report some nausea, no vomiting.  She reports no bowel movement since last  Thursday.  She denies any fevers or chills.  She does report a mild headache, no visual disturbances.  She denies any chest pain or dyspnea, no cough.  Denies any respiratory symptoms.  Denies any dizziness or LOC.    Present during exam: N/A   ----------------------------------------------------------------------------------------------------------------------  Objective     Consults:  • None     Procedures:  • None      Current Hospital Meds:  cefTRIAXone, 2 g, Intravenous, Q24H  enoxaparin, 40 mg, Subcutaneous, Q24H  lactobacillus acidophilus, 1 capsule, Oral, Daily  nicotine, 1 patch, Transdermal, Q24H  pantoprazole, 40 mg, Oral, Q AM  sodium chloride, 10 mL, Intravenous, Q12H      Pharmacy to Dose enoxaparin (LOVENOX),   sodium chloride, 125 mL/hr, Last Rate: 125 mL/hr (06/06/21 1524)      ----------------------------------------------------------------------------------------------------------------------  Vital Signs:  Temp:  [97.8 °F (36.6 °C)-102.5 °F (39.2 °C)] 97.9 °F (36.6 °C)  Heart Rate:  [56-96] 70  Resp:  [18-20] 18  BP: (106-129)/(67-90) 117/73  Mean Arterial Pressure (Non-Invasive) for the past 24 hrs (Last 3 readings):   Noninvasive MAP (mmHg)   06/06/21 1337 97     SpO2 Percentage    06/06/21 1431 06/06/21 2300 06/07/21 0600   SpO2: 98% 98% 99%     SpO2:  [97 %-99 %] 99 %  on   ;   Device (Oxygen Therapy): room air    Body mass index is 32.77 kg/m².  Wt Readings from Last 3 Encounters:   06/05/21 83.9 kg (185 lb)   09/30/20 76.2 kg (168 lb)   09/08/20 71.7 kg (158 lb)        Intake/Output Summary (Last 24 hours) at 6/7/2021 0786  Last data filed at 6/7/2021 0534  Gross per 24 hour   Intake 3599.84 ml   Output --   Net 3599.84 ml     Diet Regular  ----------------------------------------------------------------------------------------------------------------------  Physical exam:  Physical Exam  Nursing note reviewed.   Constitutional:       General: She is awake. She is not in acute distress.      Appearance: She is well-developed. She is obese. She is not toxic-appearing.      Comments: Lying in bed, no acute distress noted.  Patient appears to feel unwell.  On room air.  No family present at bedside.   HENT:      Head: Normocephalic and atraumatic.      Mouth/Throat:      Mouth: Mucous membranes are moist.   Eyes:      Conjunctiva/sclera: Conjunctivae normal.      Pupils: Pupils are equal, round, and reactive to light.   Cardiovascular:      Rate and Rhythm: Normal rate and regular rhythm.      Pulses:           Dorsalis pedis pulses are 2+ on the right side and 2+ on the left side.      Heart sounds: Normal heart sounds. No murmur heard.   No friction rub. No gallop.    Pulmonary:      Effort: Pulmonary effort is normal. No respiratory distress.      Breath sounds: Normal breath sounds and air entry. No wheezing, rhonchi or rales.      Comments: Speaks in full sentences without dyspnea, on room air.  Abdominal:      General: Bowel sounds are normal. There is no distension.      Palpations: Abdomen is soft.      Tenderness: There is generalized abdominal tenderness (Slightly improved ) and tenderness in the suprapubic area. There is left CVA tenderness (Improved ). There is no guarding or rebound.      Comments: No peritoneal signs   Genitourinary:     Comments: No odom catheter in place.  Musculoskeletal:      Cervical back: Normal range of motion and neck supple.      Right lower leg: No edema.      Left lower leg: No edema.   Skin:     General: Skin is warm and dry.      Capillary Refill: Capillary refill takes less than 2 seconds.      Findings: No abscess, erythema, lesion or wound.   Neurological:      General: No focal deficit present.      Mental Status: She is alert and oriented to person, place, and time.      Cranial Nerves: Cranial nerves are intact.      Sensory: Sensation is intact.      Motor: Motor function is intact.      Comments: Awake and alert. Follows commands. Answers questions  appropriately. Moves all extremities equally. Strength and sensation intact. No focal neuro deficit on exam.   Psychiatric:         Attention and Perception: Attention normal.         Mood and Affect: Mood and affect normal.         Speech: Speech normal.         Behavior: Behavior normal. Behavior is cooperative.         Thought Content: Thought content normal.         Cognition and Memory: Cognition and memory normal.         Judgment: Judgment normal.        ----------------------------------------------------------------------------------------------------------------------  Tele:    Sinus 80s     I have personally reviewed the EKG/Telemetry strips   ----------------------------------------------------------------------------------------------------------------------  Results from last 7 days   Lab Units 06/05/21  1816   TROPONIN T ng/mL <0.010     Results from last 7 days   Lab Units 06/05/21  1816   PROBNP pg/mL 350.9         Results from last 7 days   Lab Units 06/07/21  0031 06/06/21  0155 06/05/21  1816 06/05/21  1815   CRP mg/dL 17.13* 12.11* 9.25*  --    LACTATE mmol/L  --   --   --  1.9   WBC 10*3/mm3 13.23* 17.54*  --  19.66*   HEMOGLOBIN g/dL 10.7* 12.6  --  14.5   HEMATOCRIT % 33.2* 38.6  --  44.0   MCV fL 87.1 86.0  --  84.0   MCHC g/dL 32.2 32.6  --  33.0   PLATELETS 10*3/mm3 213 264  --  346     Results from last 7 days   Lab Units 06/07/21  0031 06/06/21  1740 06/06/21  0155 06/05/21  1816   SODIUM mmol/L 137  --  135* 132*   POTASSIUM mmol/L 4.5 4.1 3.3* 3.8   MAGNESIUM mg/dL 2.1  --  1.8  --    CHLORIDE mmol/L 109*  --  103 97*   CO2 mmol/L 19.3*  --  18.7* 18.8*   BUN mg/dL 8  --  10 10   CREATININE mg/dL 0.76  --  0.98 0.98   EGFR IF NONAFRICN AM mL/min/1.73 87  --  65 65   CALCIUM mg/dL 8.5*  --  8.6 9.8   GLUCOSE mg/dL 135*  --  119* 145*   ALBUMIN g/dL 2.79*  --  3.39* 4.36   BILIRUBIN mg/dL <0.2  --  0.2 0.5   ALK PHOS U/L 74  --  80 101   AST (SGOT) U/L 14  --  12 16   ALT (SGPT) U/L 12   --  11 14   Estimated Creatinine Clearance: 107 mL/min (by C-G formula based on SCr of 0.76 mg/dL).    Lab Results   Component Value Date    HGBA1C 5.60 06/05/2021     Lab Results   Component Value Date    TSH 0.485 06/05/2021     Microbiology Results (last 10 days)     Procedure Component Value - Date/Time    COVID-19 and FLU A/B PCR - Swab, Nasopharynx [[id number]]  (Normal) Collected: 06/05/21 2043    Lab Status: Final result Specimen: Swab from Nasopharynx Updated: 06/05/21 2107     COVID19 Not Detected     Influenza A PCR Not Detected     Influenza B PCR Not Detected    Narrative:      Fact sheet for providers: https://www.fda.gov/media/685376/download    Fact sheet for patients: https://www.fda.gov/media/079893/download    Test performed by PCR.    Blood Culture - Blood, Hand, Right [[id number]] Collected: 06/05/21 2008    Lab Status: Preliminary result Specimen: Blood from Hand, Right Updated: 06/06/21 2015     Blood Culture No growth at 24 hours    Blood Culture - Blood, Arm, Left [[id number]] Collected: 06/05/21 2007    Lab Status: Preliminary result Specimen: Blood from Arm, Left Updated: 06/06/21 2015     Blood Culture No growth at 24 hours    Urine Culture - Urine, Urine, Clean Catch [[id number]]  (Abnormal) Collected: 06/05/21 1816    Lab Status: Preliminary result Specimen: Urine, Clean Catch Updated: 06/06/21 1223     Urine Culture >100,000 CFU/mL Escherichia coli         Pain Management Panel     Pain Management Panel Latest Ref Rng & Units 6/5/2021 6/27/2020    AMPHETAMINES SCREEN, URINE Negative Negative Negative    BARBITURATES SCREEN Negative Negative Negative    BENZODIAZEPINE SCREEN, URINE Negative Negative Negative    BUPRENORPHINEUR Negative Negative Negative    COCAINE SCREEN, URINE Negative Negative Negative    METHADONE SCREEN, URINE Negative Negative Negative            I have personally reviewed the above laboratory results.    ----------------------------------------------------------------------------------------------------------------------  Imaging Results (Last 24 Hours)     Procedure Component Value Units Date/Time    CT Abdomen Pelvis With Contrast [[id number]] Collected: 06/05/21 1939     Updated: 06/05/21 1941    Narrative:      FINDINGS:  Included lung bases are clear.  Abdomen: The liver shows normal enhancement. The gallbladder appears unremarkable. The kidneys show normal enhancement. 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 No hydronephrosis. No perinephric stranding.  The spleen and pancreas appear unremarkable. No adrenal abnormalities. No threshold retroperitoneal adenopathy.  Pelvis: The bowel pattern demonstrates multiple fluid-filled and minimally dilated small bowel loops. The pattern suggest an enteritis, either infectious or inflammatory. The colon appears unremarkable. There is a small amount of free fluid demonstrated  within the cul-de-sac. No free air is identified. The urinary bladder appears unremarkable. The uterus is anteflexed.  No threshold pelvic or inguinal adenopathy. Regional osseous structures show no acute or aggressive bone lesions.    Impression:      1.  The small bowel demonstrate multiple fluid-filled and mildly dilated loops of small bowel with enhancement of the small bowel wall. The findings suggest an enteritis, either infectious or inflammatory.  2.  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 No hydronephrosis.  3.  Small amount of free fluid demonstrated in the dependent portion of the pelvis.    Signer Name: Memo Chavez MD   Signed: 6/5/2021 7:39 PM   Workstation Name: [id number]    [facility]    XR Chest 1 View [[id number]] Collected: 06/05/21 1919     Updated: 06/05/21 1921    Narrative:      FINDINGS:  Single portable AP view(s) of the chest.  The heart and mediastinal contours are normal. The lungs are clear. No pneumothorax or pleural effusion.      Impression:       No acute cardiopulmonary findings.    Signer Name: Memo Chavez MD   Signed: 6/5/2021 7:19 PM   Workstation Name: [id number]    [facility] have personally reviewed the above radiology results.   ----------------------------------------------------------------------------------------------------------------------  Assessment/Plan     -Sepsis, present on admission, secondary to acute urinary tract infection with concern for left sided pyelonephritis with CVA tenderness  -Horseshoe kidney  • CT without perinephric stranding/hydronephrotis/hydroureter, but symptomatic with left CVA tenderness of exam, treat for pyelo  • Blood cultures pending x 2 with NGTD   • Urine culture with preliminary growth of E. Coli  • CRP increased but leukocytosis resolved  • No fever overnight   • Continue empiric coverage with high dose IV Rocephin   • Lactobacillus for gut pierre protection   • PRN antipyretics for fever, monitor temperature curve closely    • IV fluids on board   • PRN pain medication with holding parameters   • Add pyridium for dysuria, max 2 days treatment   • Add lidoderm patch to left flank    • Repeat CBC and CRP in AM    -Enteritis   · CT of abdomen with multiple fluid filled and minimally dilated loops of small bowel with enhancement of small bowel wall, suggesting enteritis.  · No current diarrhea  · Provide supportive care fore now   · Should patient develop further diarrhea, will send GI panel   · PRN antiemetics, gentle IV fluids     -Normocytic anemia   · Hemoglobin dropped overnight from 12.6 to 10.7  · No active bleeding noted or reported   · Likely hemodilution in setting of IV fluids   · Monitor H&H closely, transfuse if necessary   Repeat CBC in AM    -Hypokalemia, resolved with supplementation  -Hypomagnesemia, resolved with supplementation   -Mild QTc prolongation   · Continue to replace electrolytes per protocol as necessary   · Avoid any further QTc prolonging  agents as much as possible   · QTc remains prolonged this AM at 475 m/s   · Telemetry monitoring    · Repeat chem panel, mag level, and ECG in AM    -Mild hyponatremia, resolved   · Continue gentle IVF  · Monitor closely, repeat chem panel in AM    -Mild hyperglycemia without hx of diabetes mellitus  • HgbA1C 5.6, no further work up necessary     -Essential hypertension   · BP initially labile on presentation with improved control   · Continue to hold home lisinopril currently in setting of sepsis, UTI  · IV fluids on board  · Closely monitor BP per hospital protocol, titrate medications as necessary   · PRN hydralazine IV available     -Chronic hepatitis C   · LFTs stable   · Monitor closely   · Encourage outpatient GI follow up for possible treatment     -Anxiety   -Depression   · Supportive care     -Tobacco abuse   · Encourage cessation   · NRT replacement available     -Hypoalbuminemia   · Multifactorial   · Monitor closely     -Obesity by BMI, 32.77    --------------------------------------------------  DVT Prophylaxis: SQ lovenox   GI Prophylaxis: PPI   FEN: Gentle IV fluids. Replace electrolytes per protocol as necessary. Regular diet.  Activity: As tolerated   --------------------------------------------------  Disposition:  • Plans on home at discharge   • Pending urine culture finalization, remains on IV antibiotics   • 24-48 hours discharge home  --------------------------------------------------    The patient is high risk due to the following diagnoses/reasons: Sepsis, UTI, pyelonephritis, electrolyte abnormalities, QTc prolongation, HTN, hepatitis C, ongoing tobacco abuse, obesity     I have discussed the patient's assessment and plan with the patient, AM SILVERIO Yarbrough, and attending physician Dr. Bhardwaj, Bart HWANG, *    Latha Vasquez PA-C  Hospitalist Service -- [facility]   Pager: [phone]    06/07/21  07:27 EDT    Attending Physician: Bart Bhardwaj  *    ----------------------------------------------------------------------------------------------------------------------       H Plasty Text: Given the location of the defect, shape of the defect and the proximity to free margins a H-plasty was deemed most appropriate for repair.  Using a sterile surgical marker, the appropriate advancement arms of the H-plasty were drawn incorporating the defect and placing the expected incisions within the relaxed skin tension lines where possible. The area thus outlined was incised deep to adipose tissue with a #15 scalpel blade. The skin margins were undermined to an appropriate distance in all directions utilizing iris scissors.  The opposing advancement arms were then advanced into place in opposite direction and anchored with interrupted buried subcutaneous sutures.

## 2021-06-07 NOTE — CASE MANAGEMENT/SOCIAL WORK
Discharge Planning Assessment  [facility]     Patient Name: Kacy Woo  MRN: [id number]  Today's Date: 6/7/2021    Admit Date: 6/5/2021    Discharge Needs Assessment     Row Name 06/07/21 0900       Living Environment    Lives With  alone       Transition Planning    Patient/Family Anticipates Transition to  home with family    Transportation Anticipated  family or friend will provide       Discharge Needs Assessment    Readmission Within the Last 30 Days  no previous admission in last 30 days    Current Outpatient/Agency/Support Group  -- Pt does not have HH services.    Equipment Currently Used at Home  none    Equipment Needed After Discharge  none        Discharge Plan     Row Name 06/07/21 0900       Plan    Plan  Pt lives alone in [place].  She plans to go home with her mother, Bushra Machado, for a few days at discharge.  She does not have DME or HH and denies any needs at this time.  She sees Radha Wills PA-C, as her PCP.  She has [email or web address] Medicaid insurance and gets her Rx filled at [facility].  She denies any issues with Rx co-pays or medical bills.  She is enrolled in meds to bed.  Her mother will provide transportation home at discharge.    Patient/Family in Agreement with Plan  yes    Plan Comments  Pt was admitted 6/5/21 with sepsis 2/2 (L) pyelonephritis/UTI; enteritis; Rocephin IV; IVF; WBC 19.6 => 13.2; CRP 9.25 => 17.13; TMax 102.5; UA Cx E. Coli; BCx NGTD; Compazine, Morphine, Toradol PRN; Lidoderm patch ordered.  Pt says she feels some better today but does have back pain and dysuria.  CM will follow.     Demographic Summary     Row Name 06/07/21 0900       General Information    Admission Type  inpatient    Arrived From  emergency department    General Information Comments  Pt says she sees Radha Wills PA-C, as her PCP.        Functional Status     Row Name 06/07/21 0900       Functional Status, IADL    IADL Comments  Pt says she is independent with ADL's prior to  admission.     Legal     Row Name 06/07/21 0900       Financial/Legal    Finance Comments  Pt has [place] Medicaid insurance and gets her Rx filled at [facility].  She denies any issues with Rx co-pays or medical bills.  She is enrolled in meds to bed.       Desiree Samuel RN

## 2021-06-07 NOTE — PLAN OF CARE
Goal Outcome Evaluation:        Outcome Summary: pt resting, has c/o of abdominal pain and lower back pain, gave PRN meds as prescribed, no changes,temp has been normal through night, pt is using SCUDS and heating pad, will continue to monitor

## 2021-06-08 VITALS
TEMPERATURE: 98.7 F | SYSTOLIC BLOOD PRESSURE: 151 MMHG | RESPIRATION RATE: 18 BRPM | OXYGEN SATURATION: 97 % | WEIGHT: 185 LBS | DIASTOLIC BLOOD PRESSURE: 91 MMHG | HEART RATE: 70 BPM | BODY MASS INDEX: 32.78 KG/M2 | HEIGHT: 63 IN

## 2021-06-08 LAB
ALBUMIN SERPL-MCNC: 3.12 G/DL (ref 3.5–5.2)
ALBUMIN/GLOB SERPL: 0.8 G/DL
ALP SERPL-CCNC: 82 U/L (ref 39–117)
ALT SERPL W P-5'-P-CCNC: 14 U/L (ref 1–33)
ANION GAP SERPL CALCULATED.3IONS-SCNC: 10.8 MMOL/L (ref 5–15)
AST SERPL-CCNC: 15 U/L (ref 1–32)
BASOPHILS # BLD AUTO: 0.04 10*3/MM3 (ref 0–0.2)
BASOPHILS NFR BLD AUTO: 0.4 % (ref 0–1.5)
BILIRUB SERPL-MCNC: <0.2 MG/DL (ref 0–1.2)
BUN SERPL-MCNC: 9 MG/DL (ref 6–20)
BUN/CREAT SERPL: 11 (ref 7–25)
CALCIUM SPEC-SCNC: 9.3 MG/DL (ref 8.6–10.5)
CHLORIDE SERPL-SCNC: 107 MMOL/L (ref 98–107)
CO2 SERPL-SCNC: 20.2 MMOL/L (ref 22–29)
CREAT SERPL-MCNC: 0.82 MG/DL (ref 0.57–1)
CRP SERPL-MCNC: 11.91 MG/DL (ref 0–0.5)
DEPRECATED RDW RBC AUTO: 41.2 FL (ref 37–54)
EOSINOPHIL # BLD AUTO: 0.3 10*3/MM3 (ref 0–0.4)
EOSINOPHIL NFR BLD AUTO: 3 % (ref 0.3–6.2)
ERYTHROCYTE [DISTWIDTH] IN BLOOD BY AUTOMATED COUNT: 13.2 % (ref 12.3–15.4)
GFR SERPL CREATININE-BSD FRML MDRD: 80 ML/MIN/1.73
GLOBULIN UR ELPH-MCNC: 4 GM/DL
GLUCOSE SERPL-MCNC: 101 MG/DL (ref 65–99)
HCT VFR BLD AUTO: 34.2 % (ref 34–46.6)
HGB BLD-MCNC: 11.1 G/DL (ref 12–15.9)
IMM GRANULOCYTES # BLD AUTO: 0.04 10*3/MM3 (ref 0–0.05)
IMM GRANULOCYTES NFR BLD AUTO: 0.4 % (ref 0–0.5)
LYMPHOCYTES # BLD AUTO: 2.21 10*3/MM3 (ref 0.7–3.1)
LYMPHOCYTES NFR BLD AUTO: 21.8 % (ref 19.6–45.3)
MAGNESIUM SERPL-MCNC: 1.9 MG/DL (ref 1.6–2.6)
MCH RBC QN AUTO: 27.8 PG (ref 26.6–33)
MCHC RBC AUTO-ENTMCNC: 32.5 G/DL (ref 31.5–35.7)
MCV RBC AUTO: 85.7 FL (ref 79–97)
MONOCYTES # BLD AUTO: 0.88 10*3/MM3 (ref 0.1–0.9)
MONOCYTES NFR BLD AUTO: 8.7 % (ref 5–12)
NEUTROPHILS NFR BLD AUTO: 6.66 10*3/MM3 (ref 1.7–7)
NEUTROPHILS NFR BLD AUTO: 65.7 % (ref 42.7–76)
NRBC BLD AUTO-RTO: 0 /100 WBC (ref 0–0.2)
PHOSPHATE SERPL-MCNC: 3.7 MG/DL (ref 2.5–4.5)
PLATELET # BLD AUTO: 261 10*3/MM3 (ref 140–450)
PMV BLD AUTO: 10.9 FL (ref 6–12)
POTASSIUM SERPL-SCNC: 4.1 MMOL/L (ref 3.5–5.2)
PROT SERPL-MCNC: 7.1 G/DL (ref 6–8.5)
QT INTERVAL: 446 MS
QTC INTERVAL: 474 MS
RBC # BLD AUTO: 3.99 10*6/MM3 (ref 3.77–5.28)
SODIUM SERPL-SCNC: 138 MMOL/L (ref 136–145)
WBC # BLD AUTO: 10.13 10*3/MM3 (ref 3.4–10.8)

## 2021-06-08 PROCEDURE — 85025 COMPLETE CBC W/AUTO DIFF WBC: CPT | Performed by: INTERNAL MEDICINE

## 2021-06-08 PROCEDURE — 93010 ELECTROCARDIOGRAM REPORT: CPT | Performed by: INTERNAL MEDICINE

## 2021-06-08 PROCEDURE — 93005 ELECTROCARDIOGRAM TRACING: CPT | Performed by: PHYSICIAN ASSISTANT

## 2021-06-08 PROCEDURE — 99239 HOSP IP/OBS DSCHRG MGMT >30: CPT | Performed by: INTERNAL MEDICINE

## 2021-06-08 PROCEDURE — 25010000002 ENOXAPARIN PER 10 MG: Performed by: INTERNAL MEDICINE

## 2021-06-08 PROCEDURE — 25010000002 MAGNESIUM SULFATE IN D5W 1G/100ML (PREMIX) 1-5 GM/100ML-% SOLUTION: Performed by: PHYSICIAN ASSISTANT

## 2021-06-08 PROCEDURE — 80053 COMPREHEN METABOLIC PANEL: CPT | Performed by: INTERNAL MEDICINE

## 2021-06-08 PROCEDURE — 84100 ASSAY OF PHOSPHORUS: CPT | Performed by: INTERNAL MEDICINE

## 2021-06-08 PROCEDURE — 25010000002 MORPHINE PER 10 MG: Performed by: INTERNAL MEDICINE

## 2021-06-08 PROCEDURE — 86140 C-REACTIVE PROTEIN: CPT | Performed by: INTERNAL MEDICINE

## 2021-06-08 PROCEDURE — 83735 ASSAY OF MAGNESIUM: CPT | Performed by: INTERNAL MEDICINE

## 2021-06-08 RX ORDER — PROMETHAZINE HYDROCHLORIDE 12.5 MG/1
12.5 TABLET ORAL EVERY 6 HOURS PRN
Qty: 12 TABLET | Refills: 0 | Status: SHIPPED | OUTPATIENT
Start: 2021-06-08 | End: 2021-10-11 | Stop reason: ALTCHOICE

## 2021-06-08 RX ORDER — PHENAZOPYRIDINE HYDROCHLORIDE 100 MG/1
100 TABLET, FILM COATED ORAL
Qty: 3 TABLET | Refills: 0 | Status: SHIPPED | OUTPATIENT
Start: 2021-06-08 | End: 2021-06-09

## 2021-06-08 RX ORDER — CEFDINIR 300 MG/1
300 CAPSULE ORAL 2 TIMES DAILY
Qty: 20 CAPSULE | Refills: 0 | Status: SHIPPED | OUTPATIENT
Start: 2021-06-08 | End: 2021-06-18

## 2021-06-08 RX ORDER — L.ACID,PARA/B.BIFIDUM/S.THERM 8B CELL
1 CAPSULE ORAL DAILY
Qty: 10 CAPSULE | Refills: 0 | Status: SHIPPED | OUTPATIENT
Start: 2021-06-09 | End: 2021-06-19

## 2021-06-08 RX ORDER — POLYETHYLENE GLYCOL 3350 17 G/17G
17 POWDER, FOR SOLUTION ORAL DAILY PRN
Qty: 30 EACH | Refills: 0 | Status: SHIPPED | OUTPATIENT
Start: 2021-06-08 | End: 2022-05-11

## 2021-06-08 RX ORDER — MAGNESIUM SULFATE 1 G/100ML
1 INJECTION INTRAVENOUS ONCE
Status: COMPLETED | OUTPATIENT
Start: 2021-06-08 | End: 2021-06-08

## 2021-06-08 RX ORDER — DOCOSANOL 100 MG/G
CREAM TOPICAL
Qty: 2 G | Refills: 0 | Status: SHIPPED | OUTPATIENT
Start: 2021-06-08 | End: 2021-06-13

## 2021-06-08 RX ADMIN — NICOTINE 1 PATCH: 14 PATCH, EXTENDED RELEASE TRANSDERMAL at 08:28

## 2021-06-08 RX ADMIN — DOCOSANOL: 100 CREAM TOPICAL at 05:13

## 2021-06-08 RX ADMIN — SODIUM CHLORIDE 125 ML/HR: 9 INJECTION, SOLUTION INTRAVENOUS at 00:30

## 2021-06-08 RX ADMIN — SERTRALINE 50 MG: 50 TABLET, FILM COATED ORAL at 08:28

## 2021-06-08 RX ADMIN — ENOXAPARIN SODIUM 40 MG: 40 INJECTION SUBCUTANEOUS at 08:28

## 2021-06-08 RX ADMIN — MAGNESIUM SULFATE HEPTAHYDRATE 1 G: 1 INJECTION, SOLUTION INTRAVENOUS at 05:12

## 2021-06-08 RX ADMIN — Medication 1 CAPSULE: at 08:28

## 2021-06-08 RX ADMIN — MORPHINE SULFATE 2 MG: 2 INJECTION, SOLUTION INTRAMUSCULAR; INTRAVENOUS at 08:41

## 2021-06-08 RX ADMIN — LIDOCAINE 1 PATCH: 50 PATCH CUTANEOUS at 08:28

## 2021-06-08 RX ADMIN — PANTOPRAZOLE SODIUM 40 MG: 40 TABLET, DELAYED RELEASE ORAL at 05:12

## 2021-06-08 NOTE — CASE MANAGEMENT/SOCIAL WORK
Continued Stay Note  [facility]     Patient Name: Kacy Woo  MRN: [id number]  Today's Date: 6/8/2021    Admit Date: 6/5/2021    Discharge Plan     Row Name 06/08/21 0920       Plan    Final Discharge Disposition Code  01 - home or self-care    Final Note  Pt is being discharged home today with PO abx and to f/u with PCP.  CM encouraged pt to use the 24/7 nurse call center for questions she may have after discharge.  She denies any further needs.         Desiree Samuel RN

## 2021-06-08 NOTE — PLAN OF CARE
Problem: Adult Inpatient Plan of Care  Goal: Plan of Care Review  Outcome: Ongoing, Progressing  Flowsheets  Taken 6/8/2021 0302 by Veronica Gómez RN  Progress: no change  Outcome Summary: Patient continues to have flank pain and LLQ abdominal pain. Pain medication administered. Phenergan for nausea. Will continue to monitor.  Taken 6/7/2021 1520 by Linnea Sheffield RN  Plan of Care Reviewed With: patient  Goal: Patient-Specific Goal (Individualized)  Outcome: Ongoing, Progressing  Goal: Absence of Hospital-Acquired Illness or Injury  Outcome: Ongoing, Progressing  Intervention: Identify and Manage Fall Risk  Recent Flowsheet Documentation  Taken 6/8/2021 0300 by Veronica Gómez RN  Safety Promotion/Fall Prevention: safety round/check completed  Taken 6/8/2021 0100 by Veronica Gómez RN  Safety Promotion/Fall Prevention: safety round/check completed  Taken 6/7/2021 2300 by Veronica Gómez RN  Safety Promotion/Fall Prevention: safety round/check completed  Taken 6/7/2021 2100 by Veronica Gómez RN  Safety Promotion/Fall Prevention: safety round/check completed  Taken 6/7/2021 2039 by Veronica Gómez RN  Safety Promotion/Fall Prevention: safety round/check completed  Taken 6/7/2021 1900 by Veronica Gómez RN  Safety Promotion/Fall Prevention: safety round/check completed  Intervention: Prevent and Manage VTE (venous thromboembolism) Risk  Recent Flowsheet Documentation  Taken 6/7/2021 2039 by Veronica Gómez RN  VTE Prevention/Management:   bilateral   sequential compression devices on  Goal: Optimal Comfort and Wellbeing  Outcome: Ongoing, Progressing  Intervention: Provide Person-Centered Care  Recent Flowsheet Documentation  Taken 6/7/2021 2039 by Veronica Gómez RN  Trust Relationship/Rapport:   care explained   choices provided   emotional support provided   empathic listening provided   questions answered   questions encouraged   reassurance  provided   thoughts/feelings acknowledged  Goal: Readiness for Transition of Care  Outcome: Ongoing, Progressing   Goal Outcome Evaluation:           Progress: no change  Outcome Summary: Patient continues to have flank pain and LLQ abdominal pain. Pain medication administered. Phenergan for nausea. Will continue to monitor.

## 2021-06-08 NOTE — PAYOR COMM NOTE
"CONTACT:   Lluvia Mcgraw RN  Phone: [phone]  Fax: [phone]    DISCHARGE NOTIFICATION    DISCHARGE TO:HOME/SELF CARE    REF # [id number]  DC DATE: 6/8/21    IF YOU NEED ANYTHING FURTHER PLEASE LET ME KNOW.   THANKS     Abby Woo (35 y.o. Female)     Date of Birth Social Security Number Address Home Phone MRN    1985  [address] [place], [place] [id number] [phone] [id number]    [employer] Status          [employer]        Admission Date Admission Type Admitting Provider Attending Provider Department, Room/Bed    [place] [address]    Discharge Date Discharge Disposition Discharge Destination        6/8/2021 Home or Self Care              Attending Provider: (none)   Allergies: No Known Allergies    Isolation: None   Infection: None   Code Status: CPR    Ht: 160 cm (63\")   Wt: 83.9 kg (185 lb)    Admission Cmt: None   Principal Problem: Pyelonephritis [N12]                 Active Insurance as of 6/5/2021     Primary Coverage     Payor Plan Insurance Group Employer/Plan Group    [employer] MEDICAID [employer] MED[employer]ID [id number]     Payor Plan Address Payor Plan Phone Number Payor Plan Fax Number Effective Dates    [address] [address] [phone]  1/1/2020 - None Entered    [facility] [address]       Subscriber Name Subscriber Birth Date Member ID       ABBY WOO 1985 [id number]                 Emergency Contacts      (Rel.) Home Phone Work Phone Mobile Phone    ESTHER MORRISON (Mother) [phone] -- --            Discharge Summary    No notes of this type exist for this encounter.         "

## 2021-06-08 NOTE — DISCHARGE SUMMARY
Date of Admission: 6/5/2021    Date of Discharge: 6/8/2021     PCP: Radha Wills PA    Admission Diagnosis:   Please see admission H&P    Discharge Diagnosis:   Sepsis  E coli UTI/left-sided pyelonephritis  Hypokalemia  Hypomagnesemia  Mild hyponatremia  Normocytic anemia  Mild QTc prolongation  Essential HTN  Chronic Hep C  Anxiety and depression     Procedures Performed: None     Consults:   Consults     No orders found from 5/7/2021 to 6/6/2021.            History of Present Illness:  Kacy Woo is a 35 y.o. female who presented to [facility] ED with CC of fever, chills and dysuria. Please see admission H&P for complete details.     In the ED, workup revealed sepsis, UTI with suspected left-sided pyelonephritis, possible enteritis on CT, hyponatremia, hyperglycemia and prolonged QTc. Cultures were obtained and IV antibiotics initiated.        Hospital Course  Kacy Woo was admitted to the med/surg floor for further evaluation and treatment. She was continued on high dose Rocephin and started on maintenance IV fluids. PRN analgesics and antiemetics were made available.     Repeat labs revealed hypokalemia and hypomagnesemia. The hospital's electrolyte replacement protocols were initiated with subsequent improvement on follow up labs. Her hyponatremia resolved with IV fluids as well. No episodes of diarrhea were reported and she later reported constipation which improved with bowel stimulation. She continued to report some nausea and abdominal pain but over the course of a few days her symptoms improved with resolution of her fever and improving inflammatory markers.     Ms. Woo was seen and examined with SILVERIO Yarbrough on 6/8/21. She remained afebrile and hemodynamically stable with stable AM labs. Her urine culture was finalized with E coli. She felt improved from upon admission and deemed medically stable for discharge. She was prescribed a course of Ominicef with instructions for follow up with her PCP in  1 week.       Condition on Discharge:  Stable    Vital Signs  Vitals:    06/08/21 0600   BP: 151/91   Pulse: 70   Resp: 18   Temp: 98.7 °F (37.1 °C)   SpO2: 97%       Physical Exam:  General:    Awake, alert, in no acute distress   Heart:      Normal S1 and S2. Regular rate and rhythm. No significant murmur, rubs or gallops appreciated.   Lungs:     Respirations regular, even and unlabored. Lungs clear to auscultation B/L. No wheezes, rales or rhonchi.   Abdomen:   Soft. Mild generalized TTP. No guarding, rebound tenderness or organomegaly noted. Bowel sounds present x 4. (+) left CVA tenderness    Extremities:  No clubbing, cyanosis or edema noted. Moves UE and LE equally B/L.     Discharge Disposition:   home      Discharge Medications:     Discharge Medications      New Medications      Instructions Start Date   cefdinir 300 MG capsule  Commonly known as: OMNICEF   300 mg, Oral, 2 Times Daily      docosanol 10 % cream cream  Commonly known as: ABREVA   Topical, 5 Times Daily      lactobacillus acidophilus capsule capsule   1 capsule, Oral, Daily   Start Date: June 9, 2021     phenazopyridine 100 MG tablet  Commonly known as: PYRIDIUM   100 mg, Oral, 3 Times Daily With Meals      polyethylene glycol 17 g packet  Commonly known as: MIRALAX   17 g, Oral, Daily PRN      promethazine 12.5 MG tablet  Commonly known as: PHENERGAN   12.5 mg, Oral, Every 6 Hours PRN         Continue These Medications      Instructions Start Date   cetirizine 10 MG tablet  Commonly known as: zyrTEC   10 mg, Oral, Daily      lisinopril 30 MG tablet  Commonly known as: PRINIVIL,ZESTRIL   1 tablet, Oral, Daily      sertraline 50 MG tablet  Commonly known as: ZOLOFT   50 mg, Oral, Daily               Discharge Diet:   Dietary Orders (From admission, onward)     Start     Ordered    06/05/21 2218  Diet Regular  Diet Effective Now     Question:  Diet Texture / Consistency  Answer:  Regular    06/05/21 2217                Activity at  Discharge:  activity as tolerated    Follow-up Appointments:  Follow-up Information     Radha Wills PA .    Specialty: Physician Assistant  Contact information:  [address] [place] [address] [phone]                 Your Scheduled Appointments    Sep 29, 2021  1:10 PM  Follow Up with Griselda Cheng-Akwa, APRN  [facility] GASTROENTEROLOGY AND UROLOGY ([facility]) [address] [facility] [address] [phone]   Arrive 15 minutes prior to appointment.              Test Results Pending at Discharge:  None     Bart Bhardwaj DO  06/08/21  08:42 EDT      Time: Greater than 30 minutes spent on this discharge.

## 2021-06-09 ENCOUNTER — TELEPHONE (OUTPATIENT)
Dept: INTERNAL MEDICINE | Facility: HOSPITAL | Age: 36
End: 2021-06-09

## 2021-06-09 NOTE — TELEPHONE ENCOUNTER
"I received a call from the patient this morning requesting lidocaine patches and Toradol. Patient states that she was discharged from our facility yesterday and has since developed \"stomach pain and bruising\". Patient also states that she has \"a hard time using the bathroom, nausea, and burns when she pees\". I went over the medications that she was discharged home on and explained that she needed to take them all as prescribed to help with these symptoms. Also explained that the patient could take Ibuprofen in the interim for pain as needed. Notified Dr. Bhardwaj of patient request for medications. We have advised the patient to contact her PCP. I did contact the patient back (phone # [phone]). She states that she was scheduled to see her PCP on Friday but due to now having a 100.1 temp, they are \"working her in to get seen right now\". Patient stated that she will inquire about pain medication during her visit. Patient was appreciative and no further needs identified at this time.   "

## 2021-06-10 LAB
BACTERIA SPEC AEROBE CULT: NORMAL
BACTERIA SPEC AEROBE CULT: NORMAL

## 2021-06-14 ENCOUNTER — HOSPITAL ENCOUNTER (EMERGENCY)
Facility: HOSPITAL | Age: 36
Discharge: HOME | End: 2021-06-14
Attending: EMERGENCY MEDICINE | Admitting: EMERGENCY MEDICINE

## 2021-06-14 ENCOUNTER — APPOINTMENT (OUTPATIENT)
Dept: GENERAL RADIOLOGY | Facility: HOSPITAL | Age: 36
End: 2021-06-14

## 2021-06-14 VITALS
TEMPERATURE: 99 F | WEIGHT: 185 LBS | HEIGHT: 63 IN | DIASTOLIC BLOOD PRESSURE: 93 MMHG | HEART RATE: 91 BPM | OXYGEN SATURATION: 97 % | RESPIRATION RATE: 18 BRPM | SYSTOLIC BLOOD PRESSURE: 157 MMHG | BODY MASS INDEX: 32.78 KG/M2

## 2021-06-14 DIAGNOSIS — S60.419A ABRASION OF FINGER, INITIAL ENCOUNTER: ICD-10-CM

## 2021-06-14 DIAGNOSIS — M79.89 FINGER SWELLING: Primary | ICD-10-CM

## 2021-06-14 PROCEDURE — 73130 X-RAY EXAM OF HAND: CPT | Performed by: RADIOLOGY

## 2021-06-14 PROCEDURE — 99282 EMERGENCY DEPT VISIT SF MDM: CPT

## 2021-06-14 PROCEDURE — 73130 X-RAY EXAM OF HAND: CPT
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
## 2021-06-14 NOTE — ED NOTES
Dr. Kahn used ring cutter and cut gold colored ring off of right middle finger, pt tolerated well. Ring given to pt. Ice pack applied to right middle finger per verbal order Savana Byrd, RN  06/14/21 3442

## 2021-09-27 ENCOUNTER — HOSPITAL ENCOUNTER (OUTPATIENT)
Dept: INFUSION THERAPY | Facility: HOSPITAL | Age: 36
Discharge: HOME | End: 2021-09-27
Admitting: NURSE PRACTITIONER

## 2021-09-27 VITALS
TEMPERATURE: 97.2 F | RESPIRATION RATE: 18 BRPM | DIASTOLIC BLOOD PRESSURE: 98 MMHG | OXYGEN SATURATION: 100 % | SYSTOLIC BLOOD PRESSURE: 157 MMHG | HEART RATE: 82 BPM

## 2021-09-27 DIAGNOSIS — U07.1 CLINICAL DIAGNOSIS OF SEVERE ACUTE RESPIRATORY SYNDROME CORONAVIRUS 2 (SARS-COV-2) DISEASE: Primary | ICD-10-CM

## 2021-09-27 PROCEDURE — 25010000006 INJECTION, CASIRIVIMAB AND IMDEVIMAB, 1200 MG: Performed by: NURSE PRACTITIONER

## 2021-09-27 PROCEDURE — M0243 CASIRIVI AND IMDEVI INFUSION: HCPCS | Performed by: NURSE PRACTITIONER

## 2021-09-27 RX ORDER — DIPHENHYDRAMINE HYDROCHLORIDE 50 MG/ML
50 INJECTION INTRAMUSCULAR; INTRAVENOUS ONCE AS NEEDED
Status: DISCONTINUED | OUTPATIENT
Start: 2021-09-27 | End: 2021-09-29 | Stop reason: HOSPADM

## 2021-09-27 RX ORDER — DIPHENHYDRAMINE HCL 50 MG
50 CAPSULE ORAL ONCE AS NEEDED
Status: CANCELLED | OUTPATIENT
Start: 2021-09-27

## 2021-09-27 RX ORDER — DIPHENHYDRAMINE HYDROCHLORIDE 50 MG/ML
50 INJECTION INTRAMUSCULAR; INTRAVENOUS ONCE AS NEEDED
Status: CANCELLED | OUTPATIENT
Start: 2021-09-27

## 2021-09-27 RX ORDER — METHYLPREDNISOLONE SODIUM SUCCINATE 125 MG/2ML
125 INJECTION, POWDER, LYOPHILIZED, FOR SOLUTION INTRAMUSCULAR; INTRAVENOUS AS NEEDED
Status: DISCONTINUED | OUTPATIENT
Start: 2021-09-27 | End: 2021-09-29 | Stop reason: HOSPADM

## 2021-09-27 RX ORDER — EPINEPHRINE 1 MG/ML
0.3 INJECTION, SOLUTION INTRAMUSCULAR; SUBCUTANEOUS AS NEEDED
Status: CANCELLED | OUTPATIENT
Start: 2021-09-27

## 2021-09-27 RX ORDER — DIPHENHYDRAMINE HCL 50 MG
50 CAPSULE ORAL ONCE AS NEEDED
Status: DISCONTINUED | OUTPATIENT
Start: 2021-09-27 | End: 2021-09-29 | Stop reason: HOSPADM

## 2021-09-27 RX ORDER — METHYLPREDNISOLONE SODIUM SUCCINATE 125 MG/2ML
125 INJECTION, POWDER, LYOPHILIZED, FOR SOLUTION INTRAMUSCULAR; INTRAVENOUS AS NEEDED
Status: CANCELLED | OUTPATIENT
Start: 2021-09-27

## 2021-09-27 RX ORDER — EPINEPHRINE 1 MG/ML
0.3 INJECTION, SOLUTION INTRAMUSCULAR; SUBCUTANEOUS AS NEEDED
Status: DISCONTINUED | OUTPATIENT
Start: 2021-09-27 | End: 2021-09-29 | Stop reason: HOSPADM

## 2021-09-27 RX ADMIN — CASIRIVIMAB AND IMDEVIMAB: 600; 600 INJECTION, SOLUTION, CONCENTRATE INTRAVENOUS at 16:00

## 2021-09-27 NOTE — ADDENDUM NOTE
Encounter addended by: Skylar Hoskins RN on: 9/27/2021 5:11 PM   Actions taken: Clinical Note Signed

## 2021-10-11 ENCOUNTER — OFFICE VISIT (OUTPATIENT)
Dept: CARDIOLOGY | Facility: CLINIC | Age: 36
End: 2021-10-11

## 2021-10-11 VITALS
TEMPERATURE: 97.7 F | HEIGHT: 63 IN | BODY MASS INDEX: 32.21 KG/M2 | DIASTOLIC BLOOD PRESSURE: 98 MMHG | WEIGHT: 181.8 LBS | SYSTOLIC BLOOD PRESSURE: 133 MMHG | HEART RATE: 73 BPM

## 2021-10-11 DIAGNOSIS — R07.2 PRECORDIAL PAIN: Primary | ICD-10-CM

## 2021-10-11 DIAGNOSIS — I10 UNCONTROLLED HYPERTENSION: ICD-10-CM

## 2021-10-11 PROCEDURE — 99204 OFFICE O/P NEW MOD 45 MIN: CPT | Performed by: INTERNAL MEDICINE

## 2021-10-11 PROCEDURE — 93000 ELECTROCARDIOGRAM COMPLETE: CPT | Performed by: INTERNAL MEDICINE

## 2021-10-11 RX ORDER — IBUPROFEN 800 MG/1
TABLET ORAL
COMMUNITY
Start: 2021-09-27 | End: 2022-05-11

## 2021-10-11 RX ORDER — GABAPENTIN 800 MG/1
TABLET ORAL
COMMUNITY
Start: 2021-10-07 | End: 2022-05-11

## 2021-10-11 RX ORDER — PAROXETINE 10 MG/1
10 TABLET, FILM COATED ORAL EVERY MORNING
COMMUNITY
End: 2022-05-11

## 2021-10-11 RX ORDER — LISINOPRIL 10 MG/1
10 TABLET ORAL DAILY
Qty: 30 TABLET | Refills: 3 | Status: SHIPPED | OUTPATIENT
Start: 2021-10-11 | End: 2021-11-10 | Stop reason: SDUPTHER

## 2021-10-11 RX ORDER — PHENAZOPYRIDINE HYDROCHLORIDE 100 MG/1
100 TABLET, FILM COATED ORAL 3 TIMES DAILY
COMMUNITY
Start: 2021-08-26 | End: 2021-11-02 | Stop reason: SDUPTHER

## 2021-10-11 RX ORDER — CARVEDILOL 6.25 MG/1
6.25 TABLET ORAL 2 TIMES DAILY
Qty: 60 TABLET | Refills: 3 | Status: SHIPPED | OUTPATIENT
Start: 2021-10-11 | End: 2021-11-10 | Stop reason: SDUPTHER

## 2021-10-11 NOTE — PROGRESS NOTES
Keily Galindo APRN  Kacy Woo  1985  10/11/2021    Patient Active Problem List   Diagnosis   • Pyelonephritis   • Clinical diagnosis of severe acute respiratory syndrome coronavirus 2 (SARS-CoV-2) disease       Subjective     Kacy Woo is a 35 y.o. female with the problems as listed above, presents    Chief complaint: Recurrent chest pains and uncontrolled blood pressure.    History of Present Illness: Ms. Ayala is a pleasant 35-year-old  female with no history of known heart disease or coronary artery disease, has history of hypertension, nondiabetic, history of smoking 1/2 pack a day since age 17, presents with complaints of have recurrent chest pains on and off for the last year or so which apparently have gotten worse in the last few months.  She described these episodes as being felt as discomfort and tightness and achy feeling in the substernal region associated with some shortness of breath and sweating.  These would occur especially when she gets upset or when she is working in the yard such as lifting a bag of mulch or walking on her driveway.  This is of moderate intensity and usually relieves when she sits down and rest.  She has some mild radiation of pain into the left arm.  She also has dyspnea with moderate exertion with no PND, orthopnea or pedal edema.  She states that her blood pressure runs high at home as high as diastolic blood pressure of 130s and systolic blood pressure in the 150s to 160s.  He is currently on lisinopril 30 mg daily.  She has some family history of premature heart disease with her paternal grandmother having had heart problems with heart failure in her 50s.  She was noted to be hypokalemic during the recent hospital admission.  At that time patient apparently had vomitings and diarrhea from E. coli infection.  This has subsequently resolved.    Cardiac risk factors:hypertension, smoking and Positive family Hx. of premature athersclerotivc  disease.    No Known Allergies:      Current Outpatient Medications:   •  gabapentin (NEURONTIN) 800 MG tablet, TAKE ONE TABLET BY MOUTH THREE TIMES A DAY AS NEEDED FOR NERVE PAIN, Disp: , Rfl:   •  ibuprofen (ADVIL,MOTRIN) 800 MG tablet, TAKE ONE TABLET BY MOUTH EVERY 12 HOURS AS NEEDED FOR PAIN OR FEVER, Disp: , Rfl:   •  lisinopril (PRINIVIL,ZESTRIL) 30 MG tablet, Take 1 tablet by mouth Daily., Disp: , Rfl:   •  PARoxetine (PAXIL) 10 MG tablet, Take 10 mg by mouth Every Morning., Disp: , Rfl:   •  phenazopyridine (PYRIDIUM) 100 MG tablet, Take 100 mg by mouth 3 (Three) Times a Day., Disp: , Rfl:   •  polyethylene glycol (MIRALAX) 17 g packet, Mix 17 g in 4 to 8 ounces of liquid as directed on package and drink by mouth Daily As Needed (Constipation)., Disp: 30 each, Rfl: 0  •  carvedilol (COREG) 6.25 MG tablet, Take 1 tablet by mouth 2 (Two) Times a Day., Disp: 60 tablet, Rfl: 3  •  lisinopril (PRINIVIL,ZESTRIL) 10 MG tablet, Take 1 tablet by mouth Daily., Disp: 30 tablet, Rfl: 3    Past Medical History:   Diagnosis Date   • Anxiety    • COVID-19    • Depression    • Hepatitis C    • Horseshoe kidney    • Hypertension    • PID (pelvic inflammatory disease)      Past Surgical History:   Procedure Laterality Date   •  SECTION     •  SECTION N/A 2020    Procedure:  SECTION REPEAT;  Surgeon: Sonya Barton DO;  Location: [facility] LABOR DELIVERY;  Service: Obstetrics/Gynecology;  Laterality: N/A;     Family History   Problem Relation Age of Onset   • Alcohol abuse Father    • Depression Father    • Hypertension Father    • Stroke Father    • COPD Mother    • Diabetes Mother    • Hypertension Mother    • Alcohol abuse Paternal Grandfather    • Kidney disease Paternal Grandfather    • Stroke Paternal Grandfather    • Kidney cancer Paternal Grandmother    • Coronary artery disease Paternal Grandmother    • Hypertension Paternal Grandmother    • Asthma Maternal Grandmother    • Kidney  "cancer Maternal Grandmother    • Depression Maternal Grandmother    • Diabetes Maternal Grandmother    • Hypertension Maternal Grandmother    • Kidney disease Maternal Grandmother    • Alcohol abuse Maternal Grandfather    • Lung cancer Maternal Grandfather      Social History     Tobacco Use   • Smoking status: Current Every Day Smoker     Packs/day: 2.00   • Smokeless tobacco: Never Used   Vaping Use   • Vaping Use: Never used   Substance Use Topics   • Alcohol use: No   • Drug use: Yes     Types: Methamphetamines, Oxycodone, Amphetamines, Marijuana, Benzodiazepines     Comment: suboxone, neurontin SOBER SINCE JUNE 2020       Review of Systems   Constitutional: Positive for malaise/fatigue.   HENT: Positive for hearing loss and tinnitus.         BAD BREATH   Eyes: Positive for blurred vision.   Cardiovascular: Positive for chest pain, dyspnea on exertion and leg swelling.   Respiratory: Positive for shortness of breath and wheezing.    Endocrine: Positive for cold intolerance and heat intolerance.   Hematologic/Lymphatic: Bruises/bleeds easily.        ANEMIA   Musculoskeletal: Positive for arthritis, back pain, joint pain, joint swelling, muscle weakness, myalgias and stiffness.   Gastrointestinal: Positive for abdominal pain, change in bowel habit, diarrhea, heartburn and nausea.   Genitourinary: Positive for bladder incontinence, dysuria, hematuria, menorrhagia and missed menses.   Neurological: Positive for dizziness, headaches and light-headedness.   Psychiatric/Behavioral: Positive for depression and memory loss. The patient has insomnia.    Allergic/Immunologic: Negative.        Objective   Blood pressure 133/98, pulse 73, temperature 97.7 °F (36.5 °C), height 160 cm (63\"), weight 82.5 kg (181 lb 12.8 oz), not currently breastfeeding.  Body mass index is 32.2 kg/m².      Vitals reviewed.   Constitutional:       Appearance: Well-developed.   Eyes:      Conjunctiva/sclera: Conjunctivae normal.   HENT:      " Head: Normocephalic.   Neck:      Thyroid: No thyromegaly.      Vascular: No JVD.      Trachea: No tracheal deviation.   Pulmonary:      Effort: No respiratory distress.      Breath sounds: Normal breath sounds. No wheezing. No rales.   Cardiovascular:      PMI at left midclavicular line. Normal rate. Regular rhythm. Normal S1. Normal S2.      Murmurs: There is no murmur.      No gallop. No click. No rub.   Pulses:     Intact distal pulses.   Edema:     Peripheral edema absent.   Abdominal:      General: Bowel sounds are normal.      Palpations: Abdomen is soft. There is no abdominal mass.      Tenderness: There is no abdominal tenderness.   Musculoskeletal:      Cervical back: Normal range of motion and neck supple. Skin:     General: Skin is warm and dry.   Neurological:      Mental Status: Alert and oriented to person, place, and time.      Cranial Nerves: No cranial nerve deficit.             Lab Results   Component Value Date     06/08/2021    K 4.1 06/08/2021     06/08/2021    CO2 20.2 (L) 06/08/2021    BUN 9 06/08/2021    CREATININE 0.82 06/08/2021    GLUCOSE 101 (H) 06/08/2021    CALCIUM 9.3 06/08/2021    AST 15 06/08/2021    ALT 14 06/08/2021    ALKPHOS 82 06/08/2021    LABIL2 1.2 (L) 09/13/2015     Lab Results   Component Value Date    CKTOTAL 91 06/27/2020     Lab Results   Component Value Date    WBC 10.13 06/08/2021    HGB 11.1 (L) 06/08/2021    HCT 34.2 06/08/2021     06/08/2021     Lab Results   Component Value Date    INR 0.88 (L) 06/24/2020     Lab Results   Component Value Date    MG 1.9 06/08/2021     Lab Results   Component Value Date    TSH 0.485 06/05/2021           ECG 12 Lead    Date/Time: 10/11/2021 11:11 AM  Performed by: Rich Banda MD  Authorized by: Rich Banda MD   Comparison: compared with previous ECG from 6/8/2021  Similar to previous ECG  BPM: 67  Conduction: conduction normal  ST Segments: ST segments normal  T Waves: T waves  normal            Assessment/Plan :   Diagnosis Plan   1. Precordial pain  Stress Test With Myocardial Perfusion (1 Day)   2. Uncontrolled hypertension       Recommendations:   Orders Placed This Encounter   Procedures   • Stress Test With Myocardial Perfusion (1 Day)   • ECG 12 Lead      1. We will evaluate her chest pains further with a stress sestamibi study.  2. Add enteric-coated aspirin 81 mg daily.  3. We will add carvedilol 6.25 mg p.o. twice daily  4. Increase lisinopril to 40 mg daily for now.  5. I have asked her to keep a check on the blood pressure at home twice please twice a day and bring with next visit.    Return in about 3 weeks (around 11/1/2021) for or sooner if needed.    As always,  I appreciate very much the opportunity to participate in the cardiovascular care of your patients.      With Best Regards,    Rich Banda MD, [facility]    Dragon disclaimer:  Much of this encounter note is an electronic transcription/translation of spoken language to printed text. The electronic translation of spoken language may permit erroneous, or at times, nonsensical words or phrases to be inadvertently transcribed; Although I have reviewed the note for such errors, some may still exist.

## 2021-10-15 ENCOUNTER — HOSPITAL ENCOUNTER (OUTPATIENT)
Dept: MAMMOGRAPHY | Facility: HOSPITAL | Age: 36
Discharge: HOME | End: 2021-10-15
Admitting: NURSE PRACTITIONER

## 2021-10-15 DIAGNOSIS — Z12.31 VISIT FOR SCREENING MAMMOGRAM: ICD-10-CM

## 2021-10-15 PROCEDURE — 77063 BREAST TOMOSYNTHESIS BI: CPT

## 2021-10-15 PROCEDURE — 77067 SCR MAMMO BI INCL CAD: CPT | Performed by: RADIOLOGY

## 2021-10-15 PROCEDURE — 77067 SCR MAMMO BI INCL CAD: CPT

## 2021-10-15 PROCEDURE — 77063 BREAST TOMOSYNTHESIS BI: CPT | Performed by: RADIOLOGY

## 2021-10-31 ENCOUNTER — HOSPITAL ENCOUNTER (EMERGENCY)
Facility: HOSPITAL | Age: 36
Discharge: HOME | End: 2021-10-31
Attending: STUDENT IN AN ORGANIZED HEALTH CARE EDUCATION/TRAINING PROGRAM | Admitting: STUDENT IN AN ORGANIZED HEALTH CARE EDUCATION/TRAINING PROGRAM

## 2021-10-31 VITALS
TEMPERATURE: 97.5 F | RESPIRATION RATE: 18 BRPM | SYSTOLIC BLOOD PRESSURE: 157 MMHG | BODY MASS INDEX: 30.12 KG/M2 | HEART RATE: 75 BPM | DIASTOLIC BLOOD PRESSURE: 109 MMHG | OXYGEN SATURATION: 97 % | HEIGHT: 63 IN | WEIGHT: 170 LBS

## 2021-10-31 DIAGNOSIS — S05.01XA ABRASION OF RIGHT CORNEA, INITIAL ENCOUNTER: Primary | ICD-10-CM

## 2021-10-31 LAB — B-HCG UR QL: NEGATIVE

## 2021-10-31 PROCEDURE — 81025 URINE PREGNANCY TEST: CPT | Performed by: PHYSICIAN ASSISTANT

## 2021-10-31 PROCEDURE — 99283 EMERGENCY DEPT VISIT LOW MDM: CPT

## 2021-10-31 RX ORDER — TETRACAINE HYDROCHLORIDE 5 MG/ML
2 SOLUTION OPHTHALMIC ONCE
Status: DISCONTINUED | OUTPATIENT
Start: 2021-10-31 | End: 2021-10-31 | Stop reason: HOSPADM

## 2021-10-31 RX ORDER — ERYTHROMYCIN 5 MG/G
1 OINTMENT OPHTHALMIC ONCE
Status: COMPLETED | OUTPATIENT
Start: 2021-10-31 | End: 2021-10-31

## 2021-10-31 RX ORDER — ERYTHROMYCIN 5 MG/G
OINTMENT OPHTHALMIC EVERY 6 HOURS
Qty: 3.5 G | Refills: 0 | Status: SHIPPED | OUTPATIENT
Start: 2021-10-31 | End: 2022-05-11

## 2021-10-31 RX ADMIN — ERYTHROMYCIN 1 APPLICATION: 5 OINTMENT OPHTHALMIC at 15:15

## 2021-11-02 ENCOUNTER — OFFICE VISIT (OUTPATIENT)
Dept: UROLOGY | Facility: CLINIC | Age: 36
End: 2021-11-02

## 2021-11-02 ENCOUNTER — HOSPITAL ENCOUNTER (OUTPATIENT)
Dept: GENERAL RADIOLOGY | Facility: HOSPITAL | Age: 36
Discharge: HOME | End: 2021-11-02
Admitting: NURSE PRACTITIONER

## 2021-11-02 VITALS — BODY MASS INDEX: 31.89 KG/M2 | WEIGHT: 180 LBS | HEIGHT: 63 IN

## 2021-11-02 DIAGNOSIS — R31.0 GROSS HEMATURIA: ICD-10-CM

## 2021-11-02 DIAGNOSIS — N39.0 RECURRENT UTI: Primary | ICD-10-CM

## 2021-11-02 DIAGNOSIS — N10 ACUTE PYELONEPHRITIS: ICD-10-CM

## 2021-11-02 DIAGNOSIS — R10.9 BILATERAL FLANK PAIN: ICD-10-CM

## 2021-11-02 LAB
BILIRUB BLD-MCNC: NEGATIVE MG/DL
CLARITY, POC: CLEAR
COLOR UR: YELLOW
EXPIRATION DATE: ABNORMAL
GLUCOSE UR STRIP-MCNC: NEGATIVE MG/DL
KETONES UR QL: NEGATIVE
LEUKOCYTE EST, POC: ABNORMAL
Lab: ABNORMAL
NITRITE UR-MCNC: NEGATIVE MG/ML
PH UR: 6 [PH] (ref 5–8)
PROT UR STRIP-MCNC: NEGATIVE MG/DL
RBC # UR STRIP: ABNORMAL /UL
SP GR UR: 1.02 (ref 1–1.03)
UROBILINOGEN UR QL: NORMAL

## 2021-11-02 PROCEDURE — 74018 RADEX ABDOMEN 1 VIEW: CPT | Performed by: RADIOLOGY

## 2021-11-02 PROCEDURE — 87086 URINE CULTURE/COLONY COUNT: CPT | Performed by: NURSE PRACTITIONER

## 2021-11-02 PROCEDURE — 99214 OFFICE O/P EST MOD 30 MIN: CPT | Performed by: NURSE PRACTITIONER

## 2021-11-02 PROCEDURE — 51798 US URINE CAPACITY MEASURE: CPT | Performed by: NURSE PRACTITIONER

## 2021-11-02 PROCEDURE — 74018 RADEX ABDOMEN 1 VIEW: CPT

## 2021-11-02 RX ORDER — NITROFURANTOIN 25; 75 MG/1; MG/1
CAPSULE ORAL
Qty: 56 CAPSULE | Refills: 3 | Status: SHIPPED | OUTPATIENT
Start: 2021-11-02 | End: 2022-05-11

## 2021-11-02 RX ORDER — PHENAZOPYRIDINE HYDROCHLORIDE 100 MG/1
100 TABLET, FILM COATED ORAL 3 TIMES DAILY
Qty: 20 TABLET | Refills: 0 | Status: SHIPPED | OUTPATIENT
Start: 2021-11-02 | End: 2022-05-11

## 2021-11-02 RX ORDER — ONDANSETRON 4 MG/1
4 TABLET, FILM COATED ORAL EVERY 12 HOURS PRN
Qty: 20 TABLET | Refills: 0 | Status: SHIPPED | OUTPATIENT
Start: 2021-11-02 | End: 2022-05-11

## 2021-11-02 NOTE — PROGRESS NOTES
"Chief Complaint  Dysuria / UTI (Yearly fu )    Grey Woo presents to [facility]  History of Present Illness    MS ABBY WOO IS A PLEASANT  34 y.o. female with a significant history of recurrent UTIs who presents to clinic today for evaluation. She reports currently doing better on antibiotic prophylaxis with Macrobid.  She also reports  A significant  improvement in her urinary symptoms.  She denies having any more episodes of frequency, urgency, or nocturia. She denies dysuria, burning on urination, or gross hematuria. She reports lower back pain, pelvic pain and pressure, but denies abdominal pain, flank pain, she does not have any CVA tenderness. She denies fever or chills, she does not have nausea, vomiting, or diarrhea. Her Urine dipstick show 1+ leukocyte esterase, negative nitrates, negative gross but showed 3+ microscopic hematuria.     Again we both reviewed/discussed her last CT scan results which are negative for any etiology or causes of her gross hematuria. It however the confirm a horseshoe kidney, with uterine engorgement.  She was 3 months postpartum.     Patient reports her issues with recurrent UTI have been ongoing \" for a While\", starting since childhood when she was diagnosed with \"horseshoe kidney\".  She reports she has dealt with kidney/bladder infections all her life. She is just not happy to be feeling better she states.    Overall she reports doing better, she states she is currently taking her antibiotics and probiotics diligently.  She reports  increasing her p.o. fluid intake to at least 2 L daily, and has significantly reduced her intake of soda drinks. She drinks more water and cranberry drinks.     She is , with a significant medical history of hepatitis C, PID, kidney stones. The rest of  her medical history is listed below     Objective   Vital Signs:   Ht 160 cm (63\")   Wt 81.6 kg (180 lb)   BMI 31.89 " kg/m²     Physical Exam  Constitutional:       General: She is in acute distress.      Appearance: She is well-developed. She is obese.   HENT:      Head: Normocephalic and atraumatic.   Eyes:      Pupils: Pupils are equal, round, and reactive to light.   Neck:      Thyroid: No thyromegaly.      Trachea: No tracheal deviation.   Cardiovascular:      Rate and Rhythm: Normal rate and regular rhythm.      Heart sounds: No murmur heard.      Pulmonary:      Effort: Pulmonary effort is normal. No respiratory distress.      Breath sounds: Normal breath sounds. No stridor. No wheezing.   Abdominal:      General: Bowel sounds are normal. There is distension.      Palpations: Abdomen is soft.      Tenderness: There is abdominal tenderness.   Genitourinary:     Labia:         Right: No tenderness.         Left: No tenderness.       Vagina: Normal. No vaginal discharge.   Musculoskeletal:         General: Tenderness present. No deformity. Normal range of motion.      Cervical back: Normal range of motion.   Skin:     General: Skin is warm and dry.      Capillary Refill: Capillary refill takes less than 2 seconds.      Coloration: Skin is not pale.      Findings: No erythema or rash.   Neurological:      Mental Status: She is alert and oriented to person, place, and time.      Cranial Nerves: No cranial nerve deficit.      Sensory: No sensory deficit.      Coordination: Coordination normal.   Psychiatric:         Behavior: Behavior normal.         Thought Content: Thought content normal.         Judgment: Judgment normal.        Result Review :     UA    Urinalysis 6/5/21 6/5/21 11/2/21    1816 1816    Squamous Epithelial Cells, UA 0-2     Specific Gravity, UA  1.017    Ketones, UA  Negative Negative   Blood, UA  Large (3+) (A)    Leukocytes, UA  Moderate (2+) (A) Small (1+) (A)   Nitrite, UA  Positive (A)    RBC, UA 31-50 (A)     WBC, UA Too Numerous to Count (A)     Bacteria, UA 3+ (A)     (A) Abnormal value            Urine  Culture    Urine Culture 6/5/21 11/2/21   Urine Culture >100,000 CFU/mL Escherichia coli (A) No growth   (A) Abnormal value                      Assessment and Plan    Diagnoses and all orders for this visit:    1. Recurrent UTI (Primary)  -     POC Urinalysis Dipstick, Automated  -     Urine Culture - Urine, Urine, Clean Catch  -     XR abdomen kub  -     nitrofurantoin, macrocrystal-monohydrate, (Macrobid) 100 MG capsule; TAKE 1 CAPSULE BY MOUTH TWICE DAILY X 7 DAYS, THEN TAKE 1 CAPSULE NIGHTLY FOR SUPPRESSIVE THERAPY E'COLI UTIs  Dispense: 56 capsule; Refill: 3  -     ondansetron (Zofran) 4 MG tablet; Take 1 tablet by mouth Every 12 (Twelve) Hours As Needed for Nausea.  Dispense: 20 tablet; Refill: 0  -     phenazopyridine (PYRIDIUM) 100 MG tablet; Take 1 tablet by mouth 3 (Three) Times a Day. STOP AFTER 3 DAYS, USE PRN ONLY FOR DYSURIA/SPASMS  Dispense: 20 tablet; Refill: 0    2. Gross hematuria  -     XR abdomen kub  -     nitrofurantoin, macrocrystal-monohydrate, (Macrobid) 100 MG capsule; TAKE 1 CAPSULE BY MOUTH TWICE DAILY X 7 DAYS, THEN TAKE 1 CAPSULE NIGHTLY FOR SUPPRESSIVE THERAPY E'COLI UTIs  Dispense: 56 capsule; Refill: 3  -     ondansetron (Zofran) 4 MG tablet; Take 1 tablet by mouth Every 12 (Twelve) Hours As Needed for Nausea.  Dispense: 20 tablet; Refill: 0  -     phenazopyridine (PYRIDIUM) 100 MG tablet; Take 1 tablet by mouth 3 (Three) Times a Day. STOP AFTER 3 DAYS, USE PRN ONLY FOR DYSURIA/SPASMS  Dispense: 20 tablet; Refill: 0    3. Bilateral flank pain  -     XR abdomen kub  -     nitrofurantoin, macrocrystal-monohydrate, (Macrobid) 100 MG capsule; TAKE 1 CAPSULE BY MOUTH TWICE DAILY X 7 DAYS, THEN TAKE 1 CAPSULE NIGHTLY FOR SUPPRESSIVE THERAPY E'COLI UTIs  Dispense: 56 capsule; Refill: 3  -     ondansetron (Zofran) 4 MG tablet; Take 1 tablet by mouth Every 12 (Twelve) Hours As Needed for Nausea.  Dispense: 20 tablet; Refill: 0  -     phenazopyridine (PYRIDIUM) 100 MG tablet; Take 1 tablet by  mouth 3 (Three) Times a Day. STOP AFTER 3 DAYS, USE PRN ONLY FOR DYSURIA/SPASMS  Dispense: 20 tablet; Refill: 0    4. Acute pyelonephritis  -     nitrofurantoin, macrocrystal-monohydrate, (Macrobid) 100 MG capsule; TAKE 1 CAPSULE BY MOUTH TWICE DAILY X 7 DAYS, THEN TAKE 1 CAPSULE NIGHTLY FOR SUPPRESSIVE THERAPY E'COLI UTIs  Dispense: 56 capsule; Refill: 3    Other orders  -     Bladder Scan                                                          ASSESSMENT  Recurrent urinary tract infections / GROSS/MCROSCOPIC Hematuria /Abdominal pain: Very pleasant and energetic 34 year old female  returns to clinic today for her yearly follow-up on recurrent UTIs. She reports doing relatively well this last year, and is in no apparent discomfort upon evaluation today. Again, we both reviewed/discussed her CT scan results today which mainly validates her horseshoe kidney. There is also evidence of uterine engorgement seen on the CT scan, otherwise unremarkable.     Discussed Gross/ Microscopic Hematuria with patient. She has been Asymptomatic for any gross hematuria, her urine dip however still shows 3+ microscopic hematuria. Pt educated on possible causes such as infection in the bladder, kidney, or bladder discomfort, trauma. vigorous exercise, different kinds of cancers, viral illness, such as hepatitis a virus that causes liver disease and inflammation of the liver and sexual activity.  WE Discussed the fact that there is about a 96% chance of a negative workup with episodes of microscopic hematuria and with much greater in the face of Gross hematuria.  We discussed the fact that this is a non-cumulative test.  In other words if there is hematuria next year I would recommend continuing to work up the condition because of the fact that neoplasms may be small at the first workup and easily are missed. Her last CT scan was negative.  Lower tract investigation was unremarkable as a source.    Recurrent urinary tract  infections/OverActiveBladder/yeast vaginitis: She is in no apparent distress and reports a remarkable improvement in her overall symptoms. She is happy to be feeling better she states.She reports doing relatively better on her antibiotic prophylaxis, and would like to continue.     Again,  We discussed the types of organisms that are found in the urinary tract indicating that the vast majority are results of the patient's own gastrointestinal pierre.  We discussed how many of the antibiotics that are utilized can actually exacerbate these infections by creating resistant organisms and there is only a very few antibiotics that are concentrated in the urine and do not affect the rectal reservoir nor cause recurrent yeast vaginitis.      We discussed the risk factors for recurrent infections being intercourse in younger patients and atrophic changes in older patients.  We discussed the symptoms that are found including pain, pressure, burning, frequency, urgency suprapubic pain and painful intercourse.  I discussed upper tract symptoms including fevers, chills, and indicated the workup would be much more aggressive if the patient were to present with recurrent infections in the face of upper tract symptomatology such as fever. I discussed the history of vesicoureteral reflux in young patients and finally chronic renal scarring as a result of such.         PLAN  We resent her urine for culture. I will call her with results if any bacteria growth not sensitive to her current therapy of Macrobid.    Will Continue Macrobid 100 mg PO Daily -Suppressive Therapy. She should increase her p.o. fluid intake to at least 1 to 2 L daily and avoid bladder irritants such as caffeine products, spicy foods, and citrusy foods.     I recommend concomitant probiotics with treatment with antibiotics to protect the rectal reservoir including over-the-counter yogurt preparations to senthil oral pills containing the appropriate probiotics.  Patient reports the diligent use of Probiotics.    She is to also continue other medications for yeast vaginitis, atrophic vaginitis as prescribed above.    Continue Pyridium 200 mg PRN dysuria     Discussed IC, lidocaine 5 cc instilled In Urethra for comfort     Patient is agreeable to plan of care     Patient reports that she is not currently experiencing any symptoms of urinary incontinence.     Patient's Body mass index is 28.9 kg/m². BMI is above normal parameters. Recommendations include: educational material, exercise counseling and nutrition counseling.     Smoking Cessation Counseling:  Current every day smoker. less than 3 minutes spent counseling. Will try to cut down.  I advised patient to quit tobacco use and offered support.  I provided patient with tobacco cessation educational material printed in the patient's After Visit Summary.      Follow Up   Return in about 6 weeks (around 12/14/2021) for Next scheduled follow up, RECURRENT UTI/DYSURIA/DETRUSSOR INSTABILITY.  Patient was given instructions and counseling regarding her condition or for health maintenance advice. Please see specific information pulled into the AVS if appropriate.

## 2021-11-03 LAB — BACTERIA SPEC AEROBE CULT: NO GROWTH

## 2021-11-05 ENCOUNTER — HOSPITAL ENCOUNTER (OUTPATIENT)
Dept: NUCLEAR MEDICINE | Facility: HOSPITAL | Age: 36
Discharge: HOME | End: 2021-11-05

## 2021-11-05 ENCOUNTER — HOSPITAL ENCOUNTER (OUTPATIENT)
Dept: CARDIOLOGY | Facility: HOSPITAL | Age: 36
Discharge: HOME | End: 2021-11-05

## 2021-11-05 DIAGNOSIS — R07.2 PRECORDIAL PAIN: ICD-10-CM

## 2021-11-05 LAB
BH CV NUCLEAR PRIOR STUDY: 3
BH CV REST NUCLEAR ISOTOPE DOSE: 8.9 MCI
BH CV STRESS BP STAGE 1: NORMAL
BH CV STRESS BP STAGE 2: NORMAL
BH CV STRESS BP STAGE 3: NORMAL
BH CV STRESS COMMENTS STAGE 1: NORMAL
BH CV STRESS DOSE REGADENOSON STAGE 1: 0.4
BH CV STRESS DURATION MIN STAGE 1: 3
BH CV STRESS DURATION MIN STAGE 2: 3
BH CV STRESS DURATION MIN STAGE 3: 1
BH CV STRESS DURATION SEC STAGE 1: 0
BH CV STRESS DURATION SEC STAGE 2: 0
BH CV STRESS DURATION SEC STAGE 3: 28
BH CV STRESS GRADE STAGE 1: 10
BH CV STRESS GRADE STAGE 2: 12
BH CV STRESS GRADE STAGE 3: 14
BH CV STRESS HR STAGE 1: 98
BH CV STRESS HR STAGE 2: 123
BH CV STRESS HR STAGE 3: 147
BH CV STRESS METS STAGE 1: 5
BH CV STRESS METS STAGE 2: 7.5
BH CV STRESS METS STAGE 3: 10
BH CV STRESS NUCLEAR ISOTOPE DOSE: 29.4 MCI
BH CV STRESS PROTOCOL 1: NORMAL
BH CV STRESS PROTOCOL 2 BP STAGE 1: NORMAL
BH CV STRESS PROTOCOL 2 BP STAGE 2: NORMAL
BH CV STRESS PROTOCOL 2 COMMENTS STAGE 1: NORMAL
BH CV STRESS PROTOCOL 2 COMMENTS STAGE 2: NORMAL
BH CV STRESS PROTOCOL 2 DOSE REGADENOSON STAGE 1: 0.4
BH CV STRESS PROTOCOL 2 DURATION MIN STAGE 1: 0
BH CV STRESS PROTOCOL 2 DURATION MIN STAGE 2: 4
BH CV STRESS PROTOCOL 2 DURATION SEC STAGE 1: 10
BH CV STRESS PROTOCOL 2 DURATION SEC STAGE 2: 0
BH CV STRESS PROTOCOL 2 HR STAGE 1: 94
BH CV STRESS PROTOCOL 2 HR STAGE 2: 120
BH CV STRESS PROTOCOL 2 STAGE 1: 1
BH CV STRESS PROTOCOL 2 STAGE 2: 2
BH CV STRESS PROTOCOL 2: NORMAL
BH CV STRESS RECOVERY BP: NORMAL MMHG
BH CV STRESS RECOVERY HR: 96 BPM
BH CV STRESS SPEED STAGE 1: 1.7
BH CV STRESS SPEED STAGE 2: 2.5
BH CV STRESS SPEED STAGE 3: 3.4
BH CV STRESS STAGE 1: 1
BH CV STRESS STAGE 2: 2
BH CV STRESS STAGE 3: 3
LV EF NUC BP: 65 %
MAXIMAL PREDICTED HEART RATE: 185 BPM
PERCENT MAX PREDICTED HR: 79.46 %
STRESS BASELINE BP: NORMAL MMHG
STRESS BASELINE HR: 7 BPM
STRESS PERCENT HR: 93 %
STRESS POST ESTIMATED WORKLOAD: 10.1 METS
STRESS POST EXERCISE DUR MIN: 7 MIN
STRESS POST EXERCISE DUR SEC: 28 SEC
STRESS POST PEAK BP: NORMAL MMHG
STRESS POST PEAK HR: 147 BPM
STRESS TARGET HR: 157 BPM

## 2021-11-05 PROCEDURE — 93018 CV STRESS TEST I&R ONLY: CPT | Performed by: INTERNAL MEDICINE

## 2021-11-05 PROCEDURE — A9500 TC99M SESTAMIBI: HCPCS | Performed by: INTERNAL MEDICINE

## 2021-11-05 PROCEDURE — 0 TECHNETIUM SESTAMIBI: Performed by: INTERNAL MEDICINE

## 2021-11-05 PROCEDURE — 93017 CV STRESS TEST TRACING ONLY: CPT

## 2021-11-05 PROCEDURE — 78452 HT MUSCLE IMAGE SPECT MULT: CPT

## 2021-11-05 PROCEDURE — 25010000002 REGADENOSON 0.4 MG/5ML SOLUTION: Performed by: INTERNAL MEDICINE

## 2021-11-05 PROCEDURE — 78452 HT MUSCLE IMAGE SPECT MULT: CPT | Performed by: INTERNAL MEDICINE

## 2021-11-05 RX ADMIN — TECHNETIUM TC 99M SESTAMIBI 1 DOSE: 1 INJECTION INTRAVENOUS at 09:28

## 2021-11-05 RX ADMIN — REGADENOSON 0.4 MG: 0.08 INJECTION, SOLUTION INTRAVENOUS at 10:52

## 2021-11-05 RX ADMIN — TECHNETIUM TC 99M SESTAMIBI 1 DOSE: 1 INJECTION INTRAVENOUS at 10:52

## 2021-11-10 ENCOUNTER — TELEPHONE (OUTPATIENT)
Dept: CARDIOLOGY | Facility: CLINIC | Age: 36
End: 2021-11-10

## 2021-11-10 RX ORDER — CARVEDILOL 6.25 MG/1
6.25 TABLET ORAL 2 TIMES DAILY
Qty: 60 TABLET | Refills: 3 | Status: SHIPPED | OUTPATIENT
Start: 2021-11-10 | End: 2022-05-11

## 2021-11-10 RX ORDER — LISINOPRIL 40 MG/1
40 TABLET ORAL DAILY
Qty: 30 TABLET | Refills: 3 | Status: SHIPPED | OUTPATIENT
Start: 2021-11-10 | End: 2022-05-11

## 2021-11-10 NOTE — TELEPHONE ENCOUNTER
Please call patient regarding her losing her medication recently and is out of this medication which she doesn't know the name.

## 2021-11-11 NOTE — PATIENT INSTRUCTIONS
Urinary Tract Infection, Adult    A urinary tract infection (UTI) is an infection of any part of the urinary tract. The urinary tract includes the kidneys, ureters, bladder, and urethra. These organs make, store, and get rid of urine in the body.  Your health care provider may use other names to describe the infection. An upper UTI affects the ureters and kidneys (pyelonephritis). A lower UTI affects the bladder (cystitis) and urethra (urethritis).  What are the causes?  Most urinary tract infections are caused by bacteria in your genital area, around the entrance to your urinary tract (urethra). These bacteria grow and cause inflammation of your urinary tract.  What increases the risk?  You are more likely to develop this condition if:  · You have a urinary catheter that stays in place (indwelling).  · You are not able to control when you urinate or have a bowel movement (you have incontinence).  · You are female and you:  ? Use a spermicide or diaphragm for birth control.  ? Have low estrogen levels.  ? Are pregnant.  · You have certain genes that increase your risk (genetics).  · You are sexually active.  · You take antibiotic medicines.  · You have a condition that causes your flow of urine to slow down, such as:  ? An enlarged prostate, if you are male.  ? Blockage in your urethra (stricture).  ? A kidney stone.  ? A nerve condition that affects your bladder control (neurogenic bladder).  ? Not getting enough to drink, or not urinating often.  · You have certain medical conditions, such as:  ? Diabetes.  ? A weak disease-fighting system (immunesystem).  ? Sickle cell disease.  ? Gout.  ? Spinal cord injury.  What are the signs or symptoms?  Symptoms of this condition include:  · Needing to urinate right away (urgently).  · Frequent urination or passing small amounts of urine frequently.  · Pain or burning with urination.  · Blood in the urine.  · Urine that smells bad or unusual.  · Trouble urinating.  · Cloudy  urine.  · Vaginal discharge, if you are female.  · Pain in the abdomen or the lower back.  You may also have:  · Vomiting or a decreased appetite.  · Confusion.  · Irritability or tiredness.  · A fever.  · Diarrhea.  The first symptom in older adults may be confusion. In some cases, they may not have any symptoms until the infection has worsened.  How is this diagnosed?  This condition is diagnosed based on your medical history and a physical exam. You may also have other tests, including:  · Urine tests.  · Blood tests.  · Tests for sexually transmitted infections (STIs).  If you have had more than one UTI, a cystoscopy or imaging studies may be done to determine the cause of the infections.  How is this treated?  Treatment for this condition includes:  · Antibiotic medicine.  · Over-the-counter medicines to treat discomfort.  · Drinking enough water to stay hydrated.  If you have frequent infections or have other conditions such as a kidney stone, you may need to see a health care provider who specializes in the urinary tract (urologist).  In rare cases, urinary tract infections can cause sepsis. Sepsis is a life-threatening condition that occurs when the body responds to an infection. Sepsis is treated in the hospital with IV antibiotics, fluids, and other medicines.  Follow these instructions at home:    Medicines  · Take over-the-counter and prescription medicines only as told by your health care provider.  · If you were prescribed an antibiotic medicine, take it as told by your health care provider. Do not stop using the antibiotic even if you start to feel better.  General instructions  · Make sure you:  ? Empty your bladder often and completely. Do not hold urine for long periods of time.  ? Empty your bladder after sex.  ? Wipe from front to back after a bowel movement if you are female. Use each tissue one time when you wipe.  · Drink enough fluid to keep your urine pale yellow.  · Keep all follow-up  visits as told by your health care provider. This is important.  Contact a health care provider if:  · Your symptoms do not get better after 1-2 days.  · Your symptoms go away and then return.  Get help right away if you have:  · Severe pain in your back or your lower abdomen.  · A fever.  · Nausea or vomiting.  Summary  · A urinary tract infection (UTI) is an infection of any part of the urinary tract, which includes the kidneys, ureters, bladder, and urethra.  · Most urinary tract infections are caused by bacteria in your genital area, around the entrance to your urinary tract (urethra).  · Treatment for this condition often includes antibiotic medicines.  · If you were prescribed an antibiotic medicine, take it as told by your health care provider. Do not stop using the antibiotic even if you start to feel better.  · Keep all follow-up visits as told by your health care provider. This is important.  This information is not intended to replace advice given to you by your health care provider. Make sure you discuss any questions you have with your health care provider.  Document Revised: 12/05/2019 Document Reviewed: 06/27/2019  [employer] Patient Education © 2021 [employer]  Pyelonephritis, Adult    Pyelonephritis is an infection that occurs in the kidney. The kidneys are organs that help clean the blood by moving waste out of the blood and into the pee (urine). This infection can happen quickly, or it can last for a long time. In most cases, it clears up with treatment and does not cause other problems.  What are the causes?  This condition may be caused by:  · Germs (bacteria) going from the bladder up to the kidney. This may happen after having a bladder infection.  · Germs going from the blood to the kidney.  What increases the risk?  This condition is more likely to develop in:  · Pregnant women.  · Older people.  · People who have any of these conditions:  ? Diabetes.  ? Inflammation of the prostate gland  (prostatitis), in males.  ? Kidney stones or bladder stones.  ? Other problems with the kidney or the parts of your body that carry pee from the kidneys to the bladder (ureters).  ? Cancer.  · People who have a small, thin tube (catheter) placed in the bladder.  · People who are sexually active.  · Women who use a medicine that kills sperm (spermicide) to prevent pregnancy.  · People who have had a prior urinary tract infection (UTI).  What are the signs or symptoms?  Symptoms of this condition include:  · Peeing often.  · A strong urge to pee right away.  · Burning or stinging when peeing.  · Belly pain.  · Back pain.  · Pain in the side (flank area).  · Fever or chills.  · Blood in the pee, or dark pee.  · Feeling sick to your stomach (nauseous) or throwing up (vomiting).  How is this treated?  This condition may be treated by:  · Taking antibiotic medicines by mouth (orally).  · Drinking enough fluids.  If the infection is bad, you may need to stay in the hospital. You may be given antibiotics and fluids that are put directly into a vein through an IV tube.  In some cases, other treatments may be needed.  Follow these instructions at home:  Medicines  · Take your antibiotic medicine as told by your doctor. Do not stop taking the antibiotic even if you start to feel better.  · Take over-the-counter and prescription medicines only as told by your doctor.  General instructions    · Drink enough fluid to keep your pee pale yellow.  · Avoid caffeine, tea, and carbonated drinks.  · Pee (urinate) often. Avoid holding in pee for long periods of time.  · Pee before and after sex.  · After pooping (having a bowel movement), women should wipe from front to back. Use each tissue only once.  · Keep all follow-up visits as told by your doctor. This is important.    Contact a doctor if:  · You do not feel better after 2 days.  · Your symptoms get worse.  · You have a fever.  Get help right away if:  · You cannot take your  medicine or drink fluids as told.  · You have chills and shaking.  · You throw up.  · You have very bad pain in your side or back.  · You feel very weak or you pass out (faint).  Summary  · Pyelonephritis is an infection that occurs in the kidney.  · In most cases, this infection clears up with treatment and does not cause other problems.  · Take your antibiotic medicine as told by your doctor. Do not stop taking the antibiotic even if you start to feel better.  · Drink enough fluid to keep your pee pale yellow.  This information is not intended to replace advice given to you by your health care provider. Make sure you discuss any questions you have with your health care provider.  Document Revised: 10/22/2019 Document Reviewed: 10/22/2019  [employer] Patient Education © 2021 [employer]

## 2021-11-23 ENCOUNTER — TELEPHONE (OUTPATIENT)
Dept: CARDIOLOGY | Facility: CLINIC | Age: 36
End: 2021-11-23

## 2021-11-29 NOTE — TELEPHONE ENCOUNTER
Patient states her medications are making her very tired-she wasn't sure which one for sure-possibly coreg-she also asked about the stress results- patient advised test results would be discussed at her follow up and we would ask provider if the coreg could be causing her tiredness and that that they could also make med adjustments at her follow up if needed

## 2022-05-10 ENCOUNTER — HOSPITAL ENCOUNTER (INPATIENT)
Facility: HOSPITAL | Age: 37
LOS: 2 days | Discharge: HOME | End: 2022-05-13
Attending: EMERGENCY MEDICINE | Admitting: HOSPITALIST

## 2022-05-10 DIAGNOSIS — N10 PYELONEPHRITIS, ACUTE: Primary | ICD-10-CM

## 2022-05-10 DIAGNOSIS — M79.2 NEUROPATHIC PAIN: ICD-10-CM

## 2022-05-10 PROCEDURE — 99284 EMERGENCY DEPT VISIT MOD MDM: CPT

## 2022-05-11 ENCOUNTER — APPOINTMENT (OUTPATIENT)
Dept: CT IMAGING | Facility: HOSPITAL | Age: 37
End: 2022-05-11

## 2022-05-11 LAB
ALBUMIN SERPL-MCNC: 2.82 G/DL (ref 3.5–5.2)
ALBUMIN SERPL-MCNC: 3.46 G/DL (ref 3.5–5.2)
ALBUMIN/GLOB SERPL: 0.8 G/DL
ALBUMIN/GLOB SERPL: 0.9 G/DL
ALP SERPL-CCNC: 102 U/L (ref 39–117)
ALP SERPL-CCNC: 106 U/L (ref 39–117)
ALT SERPL W P-5'-P-CCNC: 10 U/L (ref 1–33)
ALT SERPL W P-5'-P-CCNC: 8 U/L (ref 1–33)
AMPHET+METHAMPHET UR QL: POSITIVE
AMPHETAMINES UR QL: POSITIVE
ANION GAP SERPL CALCULATED.3IONS-SCNC: 12.1 MMOL/L (ref 5–15)
ANION GAP SERPL CALCULATED.3IONS-SCNC: 14.3 MMOL/L (ref 5–15)
AST SERPL-CCNC: 14 U/L (ref 1–32)
AST SERPL-CCNC: 14 U/L (ref 1–32)
B-HCG UR QL: NEGATIVE
BACTERIA UR QL AUTO: ABNORMAL /HPF
BARBITURATES UR QL SCN: NEGATIVE
BASOPHILS # BLD AUTO: 0.05 10*3/MM3 (ref 0–0.2)
BASOPHILS # BLD AUTO: 0.05 10*3/MM3 (ref 0–0.2)
BASOPHILS NFR BLD AUTO: 0.2 % (ref 0–1.5)
BASOPHILS NFR BLD AUTO: 0.3 % (ref 0–1.5)
BENZODIAZ UR QL SCN: NEGATIVE
BILIRUB SERPL-MCNC: 0.2 MG/DL (ref 0–1.2)
BILIRUB SERPL-MCNC: 0.5 MG/DL (ref 0–1.2)
BILIRUB UR QL STRIP: NEGATIVE
BUN SERPL-MCNC: 13 MG/DL (ref 6–20)
BUN SERPL-MCNC: 14 MG/DL (ref 6–20)
BUN/CREAT SERPL: 11.2 (ref 7–25)
BUN/CREAT SERPL: 15.6 (ref 7–25)
BUPRENORPHINE SERPL-MCNC: POSITIVE NG/ML
CALCIUM SPEC-SCNC: 8.5 MG/DL (ref 8.6–10.5)
CALCIUM SPEC-SCNC: 8.6 MG/DL (ref 8.6–10.5)
CANNABINOIDS SERPL QL: NEGATIVE
CHLORIDE SERPL-SCNC: 101 MMOL/L (ref 98–107)
CHLORIDE SERPL-SCNC: 95 MMOL/L (ref 98–107)
CLARITY UR: ABNORMAL
CO2 SERPL-SCNC: 20.9 MMOL/L (ref 22–29)
CO2 SERPL-SCNC: 22.7 MMOL/L (ref 22–29)
COCAINE UR QL: NEGATIVE
COLOR UR: ABNORMAL
CREAT SERPL-MCNC: 0.9 MG/DL (ref 0.57–1)
CREAT SERPL-MCNC: 1.16 MG/DL (ref 0.57–1)
CRP SERPL-MCNC: 22.9 MG/DL (ref 0–0.5)
CRP SERPL-MCNC: 23.96 MG/DL (ref 0–0.5)
D-LACTATE SERPL-SCNC: 0.8 MMOL/L (ref 0.5–2)
DEPRECATED RDW RBC AUTO: 45.3 FL (ref 37–54)
DEPRECATED RDW RBC AUTO: 47.6 FL (ref 37–54)
EGFRCR SERPLBLD CKD-EPI 2021: 63.2 ML/MIN/1.73
EGFRCR SERPLBLD CKD-EPI 2021: 85.7 ML/MIN/1.73
EOSINOPHIL # BLD AUTO: 0 10*3/MM3 (ref 0–0.4)
EOSINOPHIL # BLD AUTO: 0.06 10*3/MM3 (ref 0–0.4)
EOSINOPHIL NFR BLD AUTO: 0 % (ref 0.3–6.2)
EOSINOPHIL NFR BLD AUTO: 0.4 % (ref 0.3–6.2)
ERYTHROCYTE [DISTWIDTH] IN BLOOD BY AUTOMATED COUNT: 15.4 % (ref 12.3–15.4)
ERYTHROCYTE [DISTWIDTH] IN BLOOD BY AUTOMATED COUNT: 15.5 % (ref 12.3–15.4)
FLUAV SUBTYP SPEC NAA+PROBE: NOT DETECTED
FLUBV RNA ISLT QL NAA+PROBE: NOT DETECTED
GLOBULIN UR ELPH-MCNC: 3.7 GM/DL
GLOBULIN UR ELPH-MCNC: 3.7 GM/DL
GLUCOSE SERPL-MCNC: 125 MG/DL (ref 65–99)
GLUCOSE SERPL-MCNC: 168 MG/DL (ref 65–99)
GLUCOSE UR STRIP-MCNC: NEGATIVE MG/DL
HBA1C MFR BLD: 5.1 % (ref 4.8–5.6)
HCG SERPL QL: NEGATIVE
HCT VFR BLD AUTO: 36.1 % (ref 34–46.6)
HCT VFR BLD AUTO: 38.1 % (ref 34–46.6)
HGB BLD-MCNC: 11.5 G/DL (ref 12–15.9)
HGB BLD-MCNC: 12.5 G/DL (ref 12–15.9)
HGB UR QL STRIP.AUTO: ABNORMAL
HOLD SPECIMEN: NORMAL
HOLD SPECIMEN: NORMAL
HYALINE CASTS UR QL AUTO: ABNORMAL /LPF
IMM GRANULOCYTES # BLD AUTO: 0.06 10*3/MM3 (ref 0–0.05)
IMM GRANULOCYTES # BLD AUTO: 0.11 10*3/MM3 (ref 0–0.05)
IMM GRANULOCYTES NFR BLD AUTO: 0.4 % (ref 0–0.5)
IMM GRANULOCYTES NFR BLD AUTO: 0.5 % (ref 0–0.5)
KETONES UR QL STRIP: NEGATIVE
LEUKOCYTE ESTERASE UR QL STRIP.AUTO: ABNORMAL
LIPASE SERPL-CCNC: 9 U/L (ref 13–60)
LYMPHOCYTES # BLD AUTO: 1.59 10*3/MM3 (ref 0.7–3.1)
LYMPHOCYTES # BLD AUTO: 2.08 10*3/MM3 (ref 0.7–3.1)
LYMPHOCYTES NFR BLD AUTO: 12.5 % (ref 19.6–45.3)
LYMPHOCYTES NFR BLD AUTO: 7.1 % (ref 19.6–45.3)
MAGNESIUM SERPL-MCNC: 1.9 MG/DL (ref 1.6–2.6)
MCH RBC QN AUTO: 26.6 PG (ref 26.6–33)
MCH RBC QN AUTO: 26.7 PG (ref 26.6–33)
MCHC RBC AUTO-ENTMCNC: 31.9 G/DL (ref 31.5–35.7)
MCHC RBC AUTO-ENTMCNC: 32.8 G/DL (ref 31.5–35.7)
MCV RBC AUTO: 81.1 FL (ref 79–97)
MCV RBC AUTO: 83.8 FL (ref 79–97)
METHADONE UR QL SCN: NEGATIVE
MONOCYTES # BLD AUTO: 1.29 10*3/MM3 (ref 0.1–0.9)
MONOCYTES # BLD AUTO: 1.48 10*3/MM3 (ref 0.1–0.9)
MONOCYTES NFR BLD AUTO: 6.6 % (ref 5–12)
MONOCYTES NFR BLD AUTO: 7.8 % (ref 5–12)
NEUTROPHILS NFR BLD AUTO: 13.08 10*3/MM3 (ref 1.7–7)
NEUTROPHILS NFR BLD AUTO: 19.3 10*3/MM3 (ref 1.7–7)
NEUTROPHILS NFR BLD AUTO: 78.6 % (ref 42.7–76)
NEUTROPHILS NFR BLD AUTO: 85.6 % (ref 42.7–76)
NITRITE UR QL STRIP: POSITIVE
NRBC BLD AUTO-RTO: 0 /100 WBC (ref 0–0.2)
NRBC BLD AUTO-RTO: 0 /100 WBC (ref 0–0.2)
OPIATES UR QL: NEGATIVE
OXYCODONE UR QL SCN: NEGATIVE
PCP UR QL SCN: NEGATIVE
PH UR STRIP.AUTO: 6 [PH] (ref 5–8)
PLATELET # BLD AUTO: 251 10*3/MM3 (ref 140–450)
PLATELET # BLD AUTO: 296 10*3/MM3 (ref 140–450)
PMV BLD AUTO: 10.2 FL (ref 6–12)
PMV BLD AUTO: 10.7 FL (ref 6–12)
POTASSIUM SERPL-SCNC: 3.2 MMOL/L (ref 3.5–5.2)
POTASSIUM SERPL-SCNC: 3.5 MMOL/L (ref 3.5–5.2)
POTASSIUM SERPL-SCNC: 4.6 MMOL/L (ref 3.5–5.2)
PROPOXYPH UR QL: NEGATIVE
PROT SERPL-MCNC: 6.5 G/DL (ref 6–8.5)
PROT SERPL-MCNC: 7.2 G/DL (ref 6–8.5)
PROT UR QL STRIP: ABNORMAL
QT INTERVAL: 494 MS
QT INTERVAL: 514 MS
QTC INTERVAL: 494 MS
QTC INTERVAL: 514 MS
RBC # BLD AUTO: 4.31 10*6/MM3 (ref 3.77–5.28)
RBC # BLD AUTO: 4.7 10*6/MM3 (ref 3.77–5.28)
RBC # UR STRIP: ABNORMAL /HPF
REF LAB TEST METHOD: ABNORMAL
SARS-COV-2 RNA PNL SPEC NAA+PROBE: NOT DETECTED
SODIUM SERPL-SCNC: 132 MMOL/L (ref 136–145)
SODIUM SERPL-SCNC: 134 MMOL/L (ref 136–145)
SP GR UR STRIP: 1.02 (ref 1–1.03)
SQUAMOUS #/AREA URNS HPF: ABNORMAL /HPF
TRICYCLICS UR QL SCN: NEGATIVE
TROPONIN T SERPL-MCNC: <0.01 NG/ML (ref 0–0.03)
UROBILINOGEN UR QL STRIP: ABNORMAL
WBC # UR STRIP: ABNORMAL /HPF
WBC NRBC COR # BLD: 16.62 10*3/MM3 (ref 3.4–10.8)
WBC NRBC COR # BLD: 22.53 10*3/MM3 (ref 3.4–10.8)
WHOLE BLOOD HOLD COAG: NORMAL
WHOLE BLOOD HOLD SPECIMEN: NORMAL

## 2022-05-11 PROCEDURE — 81001 URINALYSIS AUTO W/SCOPE: CPT | Performed by: PHYSICIAN ASSISTANT

## 2022-05-11 PROCEDURE — 86140 C-REACTIVE PROTEIN: CPT | Performed by: PHYSICIAN ASSISTANT

## 2022-05-11 PROCEDURE — 87086 URINE CULTURE/COLONY COUNT: CPT | Performed by: PHYSICIAN ASSISTANT

## 2022-05-11 PROCEDURE — 25010000002 KETOROLAC TROMETHAMINE PER 15 MG: Performed by: PHYSICIAN ASSISTANT

## 2022-05-11 PROCEDURE — 87636 SARSCOV2 & INF A&B AMP PRB: CPT | Performed by: PHYSICIAN ASSISTANT

## 2022-05-11 PROCEDURE — 84132 ASSAY OF SERUM POTASSIUM: CPT | Performed by: INTERNAL MEDICINE

## 2022-05-11 PROCEDURE — 93005 ELECTROCARDIOGRAM TRACING: CPT | Performed by: HOSPITALIST

## 2022-05-11 PROCEDURE — 25010000002 PROCHLORPERAZINE 10 MG/2ML SOLUTION: Performed by: PHYSICIAN ASSISTANT

## 2022-05-11 PROCEDURE — 74177 CT ABD & PELVIS W/CONTRAST: CPT

## 2022-05-11 PROCEDURE — 80053 COMPREHEN METABOLIC PANEL: CPT | Performed by: PHYSICIAN ASSISTANT

## 2022-05-11 PROCEDURE — 83735 ASSAY OF MAGNESIUM: CPT | Performed by: HOSPITALIST

## 2022-05-11 PROCEDURE — 99223 1ST HOSP IP/OBS HIGH 75: CPT | Performed by: HOSPITALIST

## 2022-05-11 PROCEDURE — 25010000002 CEFTRIAXONE PER 250 MG: Performed by: HOSPITALIST

## 2022-05-11 PROCEDURE — 84703 CHORIONIC GONADOTROPIN ASSAY: CPT | Performed by: PHYSICIAN ASSISTANT

## 2022-05-11 PROCEDURE — 87077 CULTURE AEROBIC IDENTIFY: CPT | Performed by: PHYSICIAN ASSISTANT

## 2022-05-11 PROCEDURE — 85025 COMPLETE CBC W/AUTO DIFF WBC: CPT | Performed by: PHYSICIAN ASSISTANT

## 2022-05-11 PROCEDURE — 81025 URINE PREGNANCY TEST: CPT | Performed by: PHYSICIAN ASSISTANT

## 2022-05-11 PROCEDURE — 87040 BLOOD CULTURE FOR BACTERIA: CPT | Performed by: PHYSICIAN ASSISTANT

## 2022-05-11 PROCEDURE — 80053 COMPREHEN METABOLIC PANEL: CPT | Performed by: HOSPITALIST

## 2022-05-11 PROCEDURE — 83605 ASSAY OF LACTIC ACID: CPT | Performed by: PHYSICIAN ASSISTANT

## 2022-05-11 PROCEDURE — 70450 CT HEAD/BRAIN W/O DYE: CPT

## 2022-05-11 PROCEDURE — 25010000002 MAGNESIUM SULFATE 2 GM/50ML SOLUTION: Performed by: HOSPITALIST

## 2022-05-11 PROCEDURE — 87186 SC STD MICRODIL/AGAR DIL: CPT | Performed by: PHYSICIAN ASSISTANT

## 2022-05-11 PROCEDURE — 83690 ASSAY OF LIPASE: CPT | Performed by: PHYSICIAN ASSISTANT

## 2022-05-11 PROCEDURE — 83036 HEMOGLOBIN GLYCOSYLATED A1C: CPT | Performed by: HOSPITALIST

## 2022-05-11 PROCEDURE — 86140 C-REACTIVE PROTEIN: CPT | Performed by: HOSPITALIST

## 2022-05-11 PROCEDURE — 84484 ASSAY OF TROPONIN QUANT: CPT | Performed by: HOSPITALIST

## 2022-05-11 PROCEDURE — 72125 CT NECK SPINE W/O DYE: CPT | Performed by: RADIOLOGY

## 2022-05-11 PROCEDURE — 25010000002 IOPAMIDOL 61 % SOLUTION: Performed by: EMERGENCY MEDICINE

## 2022-05-11 PROCEDURE — 85025 COMPLETE CBC W/AUTO DIFF WBC: CPT | Performed by: HOSPITALIST

## 2022-05-11 PROCEDURE — 25010000002 MORPHINE PER 10 MG: Performed by: HOSPITALIST

## 2022-05-11 PROCEDURE — 72125 CT NECK SPINE W/O DYE: CPT

## 2022-05-11 PROCEDURE — 25010000002 CEFTRIAXONE PER 250 MG: Performed by: PHYSICIAN ASSISTANT

## 2022-05-11 PROCEDURE — 80306 DRUG TEST PRSMV INSTRMNT: CPT | Performed by: PHYSICIAN ASSISTANT

## 2022-05-11 RX ORDER — POTASSIUM CHLORIDE 20 MEQ/1
40 TABLET, EXTENDED RELEASE ORAL EVERY 4 HOURS
Status: COMPLETED | OUTPATIENT
Start: 2022-05-11 | End: 2022-05-11

## 2022-05-11 RX ORDER — KETOROLAC TROMETHAMINE 30 MG/ML
30 INJECTION, SOLUTION INTRAMUSCULAR; INTRAVENOUS EVERY 6 HOURS PRN
Status: DISCONTINUED | OUTPATIENT
Start: 2022-05-11 | End: 2022-05-11

## 2022-05-11 RX ORDER — SODIUM CHLORIDE 0.9 % (FLUSH) 0.9 %
10 SYRINGE (ML) INJECTION AS NEEDED
Status: DISCONTINUED | OUTPATIENT
Start: 2022-05-11 | End: 2022-05-13 | Stop reason: HOSPADM

## 2022-05-11 RX ORDER — MORPHINE SULFATE 2 MG/ML
1 INJECTION, SOLUTION INTRAMUSCULAR; INTRAVENOUS EVERY 4 HOURS PRN
Status: DISCONTINUED | OUTPATIENT
Start: 2022-05-11 | End: 2022-05-13 | Stop reason: HOSPADM

## 2022-05-11 RX ORDER — SODIUM CHLORIDE 9 MG/ML
100 INJECTION, SOLUTION INTRAVENOUS CONTINUOUS
Status: DISCONTINUED | OUTPATIENT
Start: 2022-05-11 | End: 2022-05-12

## 2022-05-11 RX ORDER — MAGNESIUM SULFATE HEPTAHYDRATE 40 MG/ML
2 INJECTION, SOLUTION INTRAVENOUS AS NEEDED
Status: DISCONTINUED | OUTPATIENT
Start: 2022-05-11 | End: 2022-05-13 | Stop reason: HOSPADM

## 2022-05-11 RX ORDER — POTASSIUM CHLORIDE 7.45 MG/ML
10 INJECTION INTRAVENOUS
Status: DISCONTINUED | OUTPATIENT
Start: 2022-05-11 | End: 2022-05-13 | Stop reason: HOSPADM

## 2022-05-11 RX ORDER — PROMETHAZINE HYDROCHLORIDE 6.25 MG/5ML
6.25 SYRUP ORAL EVERY 6 HOURS PRN
Status: DISCONTINUED | OUTPATIENT
Start: 2022-05-11 | End: 2022-05-13 | Stop reason: HOSPADM

## 2022-05-11 RX ORDER — MAGNESIUM SULFATE 1 G/100ML
1 INJECTION INTRAVENOUS AS NEEDED
Status: DISCONTINUED | OUTPATIENT
Start: 2022-05-11 | End: 2022-05-13 | Stop reason: HOSPADM

## 2022-05-11 RX ORDER — PROCHLORPERAZINE EDISYLATE 5 MG/ML
10 INJECTION INTRAMUSCULAR; INTRAVENOUS ONCE
Status: COMPLETED | OUTPATIENT
Start: 2022-05-11 | End: 2022-05-11

## 2022-05-11 RX ORDER — PROMETHAZINE HYDROCHLORIDE 12.5 MG/1
6.25 TABLET ORAL EVERY 6 HOURS PRN
Status: DISCONTINUED | OUTPATIENT
Start: 2022-05-11 | End: 2022-05-13 | Stop reason: HOSPADM

## 2022-05-11 RX ORDER — SODIUM CHLORIDE 0.9 % (FLUSH) 0.9 %
10 SYRINGE (ML) INJECTION EVERY 12 HOURS SCHEDULED
Status: DISCONTINUED | OUTPATIENT
Start: 2022-05-11 | End: 2022-05-13 | Stop reason: HOSPADM

## 2022-05-11 RX ORDER — PROMETHAZINE HYDROCHLORIDE 12.5 MG/1
6.25 SUPPOSITORY RECTAL EVERY 6 HOURS PRN
Status: DISCONTINUED | OUTPATIENT
Start: 2022-05-11 | End: 2022-05-13 | Stop reason: HOSPADM

## 2022-05-11 RX ORDER — POTASSIUM CHLORIDE 1.5 G/1.77G
40 POWDER, FOR SOLUTION ORAL AS NEEDED
Status: DISCONTINUED | OUTPATIENT
Start: 2022-05-11 | End: 2022-05-13 | Stop reason: HOSPADM

## 2022-05-11 RX ORDER — POTASSIUM CHLORIDE 20 MEQ/1
40 TABLET, EXTENDED RELEASE ORAL AS NEEDED
Status: DISCONTINUED | OUTPATIENT
Start: 2022-05-11 | End: 2022-05-13 | Stop reason: HOSPADM

## 2022-05-11 RX ORDER — MAGNESIUM SULFATE HEPTAHYDRATE 40 MG/ML
4 INJECTION, SOLUTION INTRAVENOUS AS NEEDED
Status: DISCONTINUED | OUTPATIENT
Start: 2022-05-11 | End: 2022-05-13 | Stop reason: HOSPADM

## 2022-05-11 RX ORDER — MAGNESIUM SULFATE HEPTAHYDRATE 40 MG/ML
2 INJECTION, SOLUTION INTRAVENOUS ONCE
Status: COMPLETED | OUTPATIENT
Start: 2022-05-11 | End: 2022-05-11

## 2022-05-11 RX ADMIN — MORPHINE SULFATE 1 MG: 2 INJECTION, SOLUTION INTRAMUSCULAR; INTRAVENOUS at 17:20

## 2022-05-11 RX ADMIN — PROCHLORPERAZINE EDISYLATE 10 MG: 5 INJECTION INTRAMUSCULAR; INTRAVENOUS at 00:21

## 2022-05-11 RX ADMIN — CEFTRIAXONE 1 G: 1 INJECTION, POWDER, FOR SOLUTION INTRAMUSCULAR; INTRAVENOUS at 21:10

## 2022-05-11 RX ADMIN — MAGNESIUM SULFATE HEPTAHYDRATE 2 G: 40 INJECTION, SOLUTION INTRAVENOUS at 16:45

## 2022-05-11 RX ADMIN — POTASSIUM CHLORIDE 40 MEQ: 20 TABLET, EXTENDED RELEASE ORAL at 06:02

## 2022-05-11 RX ADMIN — SODIUM CHLORIDE 100 ML/HR: 9 INJECTION, SOLUTION INTRAVENOUS at 05:23

## 2022-05-11 RX ADMIN — KETOROLAC TROMETHAMINE 30 MG: 30 INJECTION, SOLUTION INTRAMUSCULAR at 00:59

## 2022-05-11 RX ADMIN — POTASSIUM CHLORIDE 40 MEQ: 20 TABLET, EXTENDED RELEASE ORAL at 09:19

## 2022-05-11 RX ADMIN — CEFTRIAXONE 2 G: 2 INJECTION, POWDER, FOR SOLUTION INTRAMUSCULAR; INTRAVENOUS at 00:59

## 2022-05-11 RX ADMIN — IOPAMIDOL 80 ML: 612 INJECTION, SOLUTION INTRAVENOUS at 01:36

## 2022-05-11 RX ADMIN — SODIUM CHLORIDE 1000 ML: 9 INJECTION, SOLUTION INTRAVENOUS at 00:15

## 2022-05-11 RX ADMIN — Medication 10 ML: at 20:08
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
## 2022-05-11 NOTE — CASE MANAGEMENT/SOCIAL WORK
Discharge Planning Assessment   [facility]     Patient Name: Kacy Woo  MRN: [id number]  Today's Date: 5/11/2022    Admit Date: 5/10/2022     Discharge Needs Assessment     Row Name 05/11/22 1509       Living Environment    People in Home parent(s);significant other;child(lizet), dependent    Name(s) of People in Home father, significant other, and 2 Y/O daughter    Primary Care Provided by self    Provides Primary Care For no one    Family Caregiver if Needed significant other;parent(s)    Quality of Family Relationships unable to assess    Able to Return to Prior Arrangements yes       Transition Planning    Patient/Family Anticipates Transition to home with family    Transportation Anticipated family or friend will provide       Discharge Needs Assessment    Equipment Currently Used at Home none    Discharge Facility/Level of Care Needs --  Pt does not utilize home health services.               Discharge Plan     Row Name 05/11/22 0671       Plan    Plan Pt lives with her father, significant other and 2 Y/O daughter and she plans to go home with her mother at discharge. Pt does not utilize home health services or DME. Pt does not have a POA or living will. PCP is Keily Galindo. SS to follow and assist as needed with discharge planning.    Patient/Family in Agreement with Plan yes               Demographic Summary     Row Name 05/11/22 8068       General Information    Referral Source nursing    Reason for Consult --  SS received consult for d/c planning; here with sepsis/pyelonephritis; UDS + for amphetamines/methamphetamines/suboxone. SS spoke to pt.               Substance Abuse     Row Name 05/11/22 9924       Substance Use    Substance Use Comment SS discussed positive UDS with pt and pt denies current substance abuse. Pt states she recently went to a wedding and can't recall if she took any substances.            NERY Andrew

## 2022-05-11 NOTE — PLAN OF CARE
Goal Outcome Evaluation:  Plan of Care Reviewed With: patient        Progress: declining  Outcome Evaluation: K+ 3.2 being replaced

## 2022-05-11 NOTE — PLAN OF CARE
Goal Outcome Evaluation:           Progress: no change  Outcome Evaluation: Patient has done well today, has slept most of the day but arouses easily.  Potassium replaced, awaiting redraw.  Will continue to monitor.

## 2022-05-11 NOTE — ED PROVIDER NOTES
Subjective     History provided by:  Patient  Flank Pain  Pain location:  R flank  Pain quality: gnawing and sharp    Pain radiates to:  Does not radiate  Pain severity:  Moderate  Onset quality:  Gradual  Duration:  5 days  Timing:  Constant  Progression:  Worsening  Chronicity:  Recurrent  Relieved by:  Nothing  Associated symptoms: dysuria    Associated symptoms: no chest pain and no fever        Review of Systems   Constitutional: Negative.  Negative for fever.   HENT: Negative.    Respiratory: Negative.    Cardiovascular: Negative.  Negative for chest pain.   Gastrointestinal: Negative.  Negative for abdominal pain.   Endocrine: Negative.    Genitourinary: Positive for dysuria, flank pain and frequency.   Skin: Negative.    Neurological: Negative.    Psychiatric/Behavioral: Negative.    All other systems reviewed and are negative.      Past Medical History:   Diagnosis Date   • Anxiety    • COVID-19    • Depression    • Hepatitis C    • Horseshoe kidney    • Hypertension    • PID (pelvic inflammatory disease)        No Known Allergies    Past Surgical History:   Procedure Laterality Date   •  SECTION     •  SECTION N/A 2020    Procedure:  SECTION REPEAT;  Surgeon: Sonya Barton DO;  Location: [facility] LABOR DELIVERY;  Service: Obstetrics/Gynecology;  Laterality: N/A;       Family History   Problem Relation Age of Onset   • Alcohol abuse Father    • Depression Father    • Hypertension Father    • Stroke Father    • COPD Mother    • Diabetes Mother    • Hypertension Mother    • Alcohol abuse Paternal Grandfather    • Kidney disease Paternal Grandfather    • Stroke Paternal Grandfather    • Kidney cancer Paternal Grandmother    • Coronary artery disease Paternal Grandmother    • Hypertension Paternal Grandmother    • Asthma Maternal Grandmother    • Kidney cancer Maternal Grandmother    • Depression Maternal Grandmother    • Diabetes Maternal Grandmother    • Hypertension  Maternal Grandmother    • Kidney disease Maternal Grandmother    • Alcohol abuse Maternal Grandfather    • Lung cancer Maternal Grandfather    • Breast cancer Neg Hx        Social History     Socioeconomic History   • Marital status:    Tobacco Use   • Smoking status: Current Every Day Smoker     Packs/day: 2.00   • Smokeless tobacco: Never Used   Vaping Use   • Vaping Use: Never used   Substance and Sexual Activity   • Alcohol use: No   • Drug use: Yes     Types: Methamphetamines, Oxycodone, Amphetamines, Marijuana, Benzodiazepines     Comment: suboxone, neurontin SOBER SINCE JUNE 2020   • Sexual activity: Yes           Objective   Physical Exam  Vitals and nursing note reviewed.   Constitutional:       General: She is not in acute distress.     Appearance: She is well-developed. She is ill-appearing. She is not diaphoretic.   HENT:      Head: Normocephalic and atraumatic.      Right Ear: External ear normal.      Left Ear: External ear normal.      Nose: Nose normal.   Eyes:      Conjunctiva/sclera: Conjunctivae normal.   Neck:      Vascular: No JVD.      Trachea: No tracheal deviation.   Cardiovascular:      Rate and Rhythm: Tachycardia present.      Heart sounds: No murmur heard.  Pulmonary:      Effort: Pulmonary effort is normal. No respiratory distress.      Breath sounds: No wheezing.   Abdominal:      General: Bowel sounds are normal.      Palpations: Abdomen is soft.      Tenderness: There is no abdominal tenderness. There is right CVA tenderness.   Musculoskeletal:         General: No deformity. Normal range of motion.      Cervical back: Normal range of motion and neck supple.   Skin:     General: Skin is warm and dry.      Coloration: Skin is not pale.      Findings: No erythema or rash.   Neurological:      Mental Status: She is alert and oriented to person, place, and time.      Cranial Nerves: No cranial nerve deficit.   Psychiatric:         Behavior: Behavior normal.         Thought Content:  Thought content normal.         Procedures           ED Course  ED Course as of 05/11/22 0245   Wed May 11, 2022   0152 CT abd pelvis rad interpreted:  1. Horseshoe kidney again noted with findings consistent with acute pyelonephritis, detailed above. No visible obstructing urinary tract stones or significant hydronephrosis.  2. Right basilar atelectasis/infiltrate. Pneumonia is not excluded in the appropriate clinical setting.  3. Normal appendix. [RB]   0243 Discussed with Dr. Linares.  Pt accepted for admission.  [RB]      ED Course User Index  [RB] Dick Monk II, PA                                                 MDM  Number of Diagnoses or Management Options  Pyelonephritis, acute: new and requires workup     Amount and/or Complexity of Data Reviewed  Clinical lab tests: ordered and reviewed  Tests in the radiology section of CPT®: ordered and reviewed  Decide to obtain previous medical records or to obtain history from someone other than the patient: yes  Discuss the patient with other providers: yes    Risk of Complications, Morbidity, and/or Mortality  Presenting problems: moderate  Diagnostic procedures: moderate  Management options: moderate    Patient Progress  Patient progress: stable      Final diagnoses:   Pyelonephritis, acute       ED Disposition  ED Disposition     ED Disposition   Decision to Admit    Condition   --    Comment   --             No follow-up provider specified.       Medication List      No changes were made to your prescriptions during this visit.          Dick Monk II, PA  05/11/22 0245

## 2022-05-12 ENCOUNTER — APPOINTMENT (OUTPATIENT)
Dept: CARDIOLOGY | Facility: HOSPITAL | Age: 37
End: 2022-05-12

## 2022-05-12 LAB
ANION GAP SERPL CALCULATED.3IONS-SCNC: 11.3 MMOL/L (ref 5–15)
BH CV ECHO MEAS - ACS: 1.4 CM
BH CV ECHO MEAS - AO MAX PG: 11.2 MMHG
BH CV ECHO MEAS - AO MEAN PG: 6 MMHG
BH CV ECHO MEAS - AO ROOT DIAM: 2.7 CM
BH CV ECHO MEAS - AO V2 MAX: 167 CM/SEC
BH CV ECHO MEAS - AO V2 VTI: 31.6 CM
BH CV ECHO MEAS - AVA(I,D): 2.25 CM2
BH CV ECHO MEAS - EDV(CUBED): 103.8 ML
BH CV ECHO MEAS - EDV(MOD-SP2): 88.7 ML
BH CV ECHO MEAS - EDV(MOD-SP4): 94.5 ML
BH CV ECHO MEAS - EF(MOD-BP): 56.4 %
BH CV ECHO MEAS - EF(MOD-SP2): 61.4 %
BH CV ECHO MEAS - EF(MOD-SP4): 52 %
BH CV ECHO MEAS - ESV(CUBED): 39.3 ML
BH CV ECHO MEAS - ESV(MOD-SP2): 34.2 ML
BH CV ECHO MEAS - ESV(MOD-SP4): 45.4 ML
BH CV ECHO MEAS - FS: 27.7 %
BH CV ECHO MEAS - IVS/LVPW: 1 CM
BH CV ECHO MEAS - IVSD: 0.7 CM
BH CV ECHO MEAS - LA DIMENSION: 3.4 CM
BH CV ECHO MEAS - LAT PEAK E' VEL: 10.8 CM/SEC
BH CV ECHO MEAS - LV DIASTOLIC VOL/BSA (35-75): 52.9 CM2
BH CV ECHO MEAS - LV MASS(C)D: 103.1 GRAMS
BH CV ECHO MEAS - LV MAX PG: 6.8 MMHG
BH CV ECHO MEAS - LV MEAN PG: 4 MMHG
BH CV ECHO MEAS - LV SYSTOLIC VOL/BSA (12-30): 25.4 CM2
BH CV ECHO MEAS - LV V1 MAX: 130 CM/SEC
BH CV ECHO MEAS - LV V1 VTI: 23.8 CM
BH CV ECHO MEAS - LVIDD: 4.7 CM
BH CV ECHO MEAS - LVIDS: 3.4 CM
BH CV ECHO MEAS - LVOT AREA: 3 CM2
BH CV ECHO MEAS - LVOT DIAM: 1.95 CM
BH CV ECHO MEAS - LVPWD: 0.7 CM
BH CV ECHO MEAS - MED PEAK E' VEL: 7.8 CM/SEC
BH CV ECHO MEAS - MR MAX PG: 67.9 MMHG
BH CV ECHO MEAS - MR MAX VEL: 412 CM/SEC
BH CV ECHO MEAS - MV A MAX VEL: 52.9 CM/SEC
BH CV ECHO MEAS - MV DEC SLOPE: 371 CM/SEC2
BH CV ECHO MEAS - MV DEC TIME: 0.24 MSEC
BH CV ECHO MEAS - MV E MAX VEL: 88.8 CM/SEC
BH CV ECHO MEAS - MV E/A: 1.68
BH CV ECHO MEAS - MV MAX PG: 3.5 MMHG
BH CV ECHO MEAS - MV MEAN PG: 1 MMHG
BH CV ECHO MEAS - MV V2 VTI: 23.9 CM
BH CV ECHO MEAS - MVA(VTI): 3 CM2
BH CV ECHO MEAS - PA ACC TIME: 0.11 SEC
BH CV ECHO MEAS - PA PR(ACCEL): 28.2 MMHG
BH CV ECHO MEAS - PA V2 MAX: 102 CM/SEC
BH CV ECHO MEAS - RAP SYSTOLE: 10 MMHG
BH CV ECHO MEAS - RVSP: 36.8 MMHG
BH CV ECHO MEAS - SI(MOD-SP2): 30.5 ML/M2
BH CV ECHO MEAS - SI(MOD-SP4): 27.5 ML/M2
BH CV ECHO MEAS - SV(LVOT): 71.1 ML
BH CV ECHO MEAS - SV(MOD-SP2): 54.5 ML
BH CV ECHO MEAS - SV(MOD-SP4): 49.1 ML
BH CV ECHO MEAS - TAPSE (>1.6): 1.91 CM
BH CV ECHO MEAS - TR MAX PG: 26.8 MMHG
BH CV ECHO MEAS - TR MAX VEL: 259 CM/SEC
BH CV ECHO MEASUREMENTS AVERAGE E/E' RATIO: 9.55
BUN SERPL-MCNC: 9 MG/DL (ref 6–20)
BUN/CREAT SERPL: 12.3 (ref 7–25)
CALCIUM SPEC-SCNC: 8.1 MG/DL (ref 8.6–10.5)
CHLORIDE SERPL-SCNC: 104 MMOL/L (ref 98–107)
CO2 SERPL-SCNC: 19.7 MMOL/L (ref 22–29)
CREAT SERPL-MCNC: 0.73 MG/DL (ref 0.57–1)
DEPRECATED RDW RBC AUTO: 48.2 FL (ref 37–54)
EGFRCR SERPLBLD CKD-EPI 2021: 110.1 ML/MIN/1.73
ERYTHROCYTE [DISTWIDTH] IN BLOOD BY AUTOMATED COUNT: 15.7 % (ref 12.3–15.4)
GLUCOSE SERPL-MCNC: 96 MG/DL (ref 65–99)
HCT VFR BLD AUTO: 32.7 % (ref 34–46.6)
HGB BLD-MCNC: 10.4 G/DL (ref 12–15.9)
LEFT ATRIUM VOLUME INDEX: 25.4 ML/M2
MAGNESIUM SERPL-MCNC: 2 MG/DL (ref 1.6–2.6)
MAXIMAL PREDICTED HEART RATE: 185 BPM
MCH RBC QN AUTO: 26.7 PG (ref 26.6–33)
MCHC RBC AUTO-ENTMCNC: 31.8 G/DL (ref 31.5–35.7)
MCV RBC AUTO: 83.8 FL (ref 79–97)
PLATELET # BLD AUTO: 264 10*3/MM3 (ref 140–450)
PMV BLD AUTO: 11.5 FL (ref 6–12)
POTASSIUM SERPL-SCNC: 4.3 MMOL/L (ref 3.5–5.2)
RBC # BLD AUTO: 3.9 10*6/MM3 (ref 3.77–5.28)
SODIUM SERPL-SCNC: 135 MMOL/L (ref 136–145)
STRESS TARGET HR: 157 BPM
WBC NRBC COR # BLD: 12.43 10*3/MM3 (ref 3.4–10.8)

## 2022-05-12 PROCEDURE — 93306 TTE W/DOPPLER COMPLETE: CPT | Performed by: INTERNAL MEDICINE

## 2022-05-12 PROCEDURE — 93306 TTE W/DOPPLER COMPLETE: CPT

## 2022-05-12 PROCEDURE — 85027 COMPLETE CBC AUTOMATED: CPT | Performed by: INTERNAL MEDICINE

## 2022-05-12 PROCEDURE — 83735 ASSAY OF MAGNESIUM: CPT | Performed by: INTERNAL MEDICINE

## 2022-05-12 PROCEDURE — 25010000002 MORPHINE PER 10 MG: Performed by: HOSPITALIST

## 2022-05-12 PROCEDURE — 80048 BASIC METABOLIC PNL TOTAL CA: CPT | Performed by: INTERNAL MEDICINE

## 2022-05-12 PROCEDURE — 99232 SBSQ HOSP IP/OBS MODERATE 35: CPT | Performed by: INTERNAL MEDICINE

## 2022-05-12 RX ORDER — ENOXAPARIN SODIUM 100 MG/ML
40 INJECTION SUBCUTANEOUS EVERY 24 HOURS
Status: DISCONTINUED | OUTPATIENT
Start: 2022-05-12 | End: 2022-05-13 | Stop reason: HOSPADM

## 2022-05-12 RX ORDER — GABAPENTIN 300 MG/1
300 CAPSULE ORAL EVERY 12 HOURS SCHEDULED
Status: DISCONTINUED | OUTPATIENT
Start: 2022-05-12 | End: 2022-05-13 | Stop reason: HOSPADM

## 2022-05-12 RX ORDER — IBUPROFEN 200 MG
200 TABLET ORAL EVERY 4 HOURS PRN
Status: DISCONTINUED | OUTPATIENT
Start: 2022-05-12 | End: 2022-05-13 | Stop reason: HOSPADM

## 2022-05-12 RX ORDER — HYDRALAZINE HYDROCHLORIDE 25 MG/1
25 TABLET, FILM COATED ORAL EVERY 8 HOURS PRN
Status: DISCONTINUED | OUTPATIENT
Start: 2022-05-12 | End: 2022-05-13 | Stop reason: HOSPADM

## 2022-05-12 RX ORDER — VALACYCLOVIR HYDROCHLORIDE 500 MG/1
2000 TABLET, FILM COATED ORAL EVERY 12 HOURS SCHEDULED
Status: DISCONTINUED | OUTPATIENT
Start: 2022-05-12 | End: 2022-05-13 | Stop reason: HOSPADM

## 2022-05-12 RX ADMIN — GABAPENTIN 300 MG: 300 CAPSULE ORAL at 09:42

## 2022-05-12 RX ADMIN — VALACYCLOVIR HYDROCHLORIDE 2000 MG: 500 TABLET, FILM COATED ORAL at 09:41

## 2022-05-12 RX ADMIN — IBUPROFEN 200 MG: 200 TABLET, FILM COATED ORAL at 18:40

## 2022-05-12 RX ADMIN — HYDRALAZINE HYDROCHLORIDE 25 MG: 25 TABLET, FILM COATED ORAL at 18:39

## 2022-05-12 RX ADMIN — MORPHINE SULFATE 1 MG: 2 INJECTION, SOLUTION INTRAMUSCULAR; INTRAVENOUS at 02:33

## 2022-05-12 RX ADMIN — VALACYCLOVIR HYDROCHLORIDE 2000 MG: 500 TABLET, FILM COATED ORAL at 21:20

## 2022-05-12 RX ADMIN — GABAPENTIN 300 MG: 300 CAPSULE ORAL at 21:20

## 2022-05-12 NOTE — NURSING NOTE
"Pt upset because she did not receive antibiotics this shift. I educated pt the antibiotic she is receiving is scheduled every 24 hours and she would receive it at 2200. Pt also upset because she was not receiving fluids I educated pt they were d/c'd by her MD. Pt upset because she had received no pain meds I educated pt that her pain meds were prn and until she asked for them I did not know that she needed them. Pt has also pulled out 2 ivs and says she doesn't want stuck because it \"triggers her.\" I have made Dr. Behbahani aware of these concerns.  "

## 2022-05-12 NOTE — NURSING NOTE
Transitional Care Note    Enrolled in [facility] Transitional Care Note    Enrolled in [facility] Transitional Care Services under theTCM Model to be followed for 6 weeks post discharge.  [facility] will assist with support and education at time of transition home from hospital.  Hospital  will follow throughout the stay at [facility] [place].  Home  will visit within 48 hours of discharge and follow with home vitals and telephone contact for 6 weeks.      Patient contacted at bedside.      Explained transition to home program and Patient is agreeable to home visits.
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
## 2022-05-12 NOTE — PLAN OF CARE
AM assessment complete. Gave am meds due see mar. A/O x 4. Gave prn pain med for pain level 7/10 located RLE. Pre PT/OT eval. See MAR/flowsheet. A/O x4. Bed locked/lowest position. Bed check. Call light within reach. Goal Outcome Evaluation:         Pt resting with no complaints at this time. New iv placed this shift.

## 2022-05-12 NOTE — PLAN OF CARE
Goal Outcome Evaluation:  Pt resting in bed. No signs or symptoms of distress noted. VSS. WCTM.                 CXR pending/nasogastric/placement confirmed by auscultation lumen(s) aspirated and flushed/sterile dressing applied ultrasound guidance

## 2022-05-13 VITALS
SYSTOLIC BLOOD PRESSURE: 147 MMHG | TEMPERATURE: 97.5 F | BODY MASS INDEX: 29.41 KG/M2 | DIASTOLIC BLOOD PRESSURE: 89 MMHG | HEART RATE: 68 BPM | HEIGHT: 63 IN | OXYGEN SATURATION: 97 % | WEIGHT: 166 LBS | RESPIRATION RATE: 18 BRPM

## 2022-05-13 PROCEDURE — 25010000002 CEFTRIAXONE PER 250 MG: Performed by: HOSPITALIST

## 2022-05-13 PROCEDURE — 99238 HOSP IP/OBS DSCHRG MGMT 30/<: CPT | Performed by: INTERNAL MEDICINE

## 2022-05-13 RX ORDER — ACETAMINOPHEN 325 MG/1
650 TABLET ORAL ONCE
Status: COMPLETED | OUTPATIENT
Start: 2022-05-13 | End: 2022-05-13

## 2022-05-13 RX ORDER — LISINOPRIL 10 MG/1
10 TABLET ORAL
Qty: 30 TABLET | Refills: 1 | Status: SHIPPED | OUTPATIENT
Start: 2022-05-14 | End: 2022-06-13

## 2022-05-13 RX ORDER — VALACYCLOVIR HYDROCHLORIDE 1 G/1
2000 TABLET, FILM COATED ORAL EVERY 12 HOURS SCHEDULED
Qty: 4 TABLET | Refills: 0 | Status: SHIPPED | OUTPATIENT
Start: 2022-05-13 | End: 2022-05-14

## 2022-05-13 RX ORDER — CEFDINIR 300 MG/1
300 CAPSULE ORAL 2 TIMES DAILY
Qty: 10 CAPSULE | Refills: 0 | Status: SHIPPED | OUTPATIENT
Start: 2022-05-13 | End: 2022-05-18

## 2022-05-13 RX ORDER — LISINOPRIL 10 MG/1
10 TABLET ORAL
Status: DISCONTINUED | OUTPATIENT
Start: 2022-05-13 | End: 2022-05-13 | Stop reason: HOSPADM

## 2022-05-13 RX ORDER — GABAPENTIN 300 MG/1
300 CAPSULE ORAL EVERY 12 HOURS SCHEDULED
Qty: 6 CAPSULE | Refills: 0 | Status: SHIPPED | OUTPATIENT
Start: 2022-05-13 | End: 2022-05-16

## 2022-05-13 RX ADMIN — LISINOPRIL 10 MG: 10 TABLET ORAL at 08:28

## 2022-05-13 RX ADMIN — CEFTRIAXONE 1 G: 1 INJECTION, POWDER, FOR SOLUTION INTRAMUSCULAR; INTRAVENOUS at 01:18

## 2022-05-13 RX ADMIN — IBUPROFEN 200 MG: 200 TABLET, FILM COATED ORAL at 00:04

## 2022-05-13 RX ADMIN — VALACYCLOVIR HYDROCHLORIDE 2000 MG: 500 TABLET, FILM COATED ORAL at 08:26

## 2022-05-13 RX ADMIN — ACETAMINOPHEN 650 MG: 325 TABLET ORAL at 01:19

## 2022-05-13 RX ADMIN — GABAPENTIN 300 MG: 300 CAPSULE ORAL at 08:26

## 2022-05-13 NOTE — PLAN OF CARE
Goal Outcome Evaluation:              Outcome Evaluation: New IV obtained this shift, IV antibiotics given, PRN medications given for pain, VSS, no other complaints or request at this time, Will continue to monitor.

## 2022-05-13 NOTE — DISCHARGE INSTR - APPOINTMENTS
Follow-up with Primary Care Provider, Keily LERMA on 5/19/22 at 10:00 am. Office number Is [phone]

## 2022-05-13 NOTE — CASE MANAGEMENT/SOCIAL WORK
Discharge Planning Assessment  ERIC Prince     Patient Name: Kacy Woo  MRN: [id number]  Today's Date: 5/13/2022    Admit Date: 5/10/2022     Discharge Plan     Row Name 05/13/22 1027       Plan    Final Discharge Disposition Code 01 - home or self-care    Final Note Pt is being discharged home today. No needs identified.              NERY Andrew
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
## 2022-05-14 LAB — BACTERIA SPEC AEROBE CULT: ABNORMAL

## 2022-05-14 NOTE — PAYOR COMM NOTE
"[facility]  PHONE  [phone]  FAX  [phone]  NPI:  [id number]    PATIENT D/C 5/13/2022    Abby Woo (36 y.o. Female)             Date of Birth   1985    Social Security Number       Address   [address] [place], [place] [address]    Home Phone   [phone]    MRN   [id number]       Nondenominational   Taoist    Marital Status                               Admission Date   5/10/22    Admission Type   Emergency    Admitting Provider   Mohan Linares MD    Attending Provider       Department, Room/Bed   [address], 3339/1P       Discharge Date   5/13/2022    Discharge Disposition   Home or Self Care    Discharge Destination                               Attending Provider: (none)   Allergies: No Known Allergies    Isolation: None   Infection: None   Code Status: Prior   Advance Care Planning Activity    Ht: 160 cm (63\")   Wt: 75.3 kg (166 lb)    Admission Cmt: None   Principal Problem: None                Active Insurance as of 5/10/2022     Primary Coverage     Payor Plan Insurance Group Employer/Plan Group    [employer] MEDICAID [employer] MEDICAID [id number]     Payor Plan Address Payor Plan Phone Number Payor Plan Fax Number Effective Dates    [address] [address] [phone]  1/1/2020 - None Entered    [facility] [id number]       Subscriber Name Subscriber Birth Date Member ID       ABBY WOO 1985 [id number]                 Emergency Contacts      (Rel.) Home Phone Work Phone Mobile Phone    ESTHER MORRISON (Mother) [phone] -- --              "

## 2022-05-16 LAB
BACTERIA SPEC AEROBE CULT: NORMAL
BACTERIA SPEC AEROBE CULT: NORMAL

## 2023-08-07 ENCOUNTER — HOSPITAL ENCOUNTER (EMERGENCY)
Facility: HOSPITAL | Age: 38
Discharge: HOME | End: 2023-08-07
Attending: EMERGENCY MEDICINE | Admitting: EMERGENCY MEDICINE
Payer: MEDICAID

## 2023-08-07 ENCOUNTER — APPOINTMENT (OUTPATIENT)
Dept: ULTRASOUND IMAGING | Facility: HOSPITAL | Age: 38
End: 2023-08-07
Payer: MEDICAID

## 2023-08-07 VITALS
OXYGEN SATURATION: 99 % | RESPIRATION RATE: 18 BRPM | SYSTOLIC BLOOD PRESSURE: 161 MMHG | DIASTOLIC BLOOD PRESSURE: 98 MMHG | WEIGHT: 166.6 LBS | HEIGHT: 63 IN | TEMPERATURE: 98.1 F | HEART RATE: 75 BPM | BODY MASS INDEX: 29.52 KG/M2

## 2023-08-07 DIAGNOSIS — B37.9 YEAST INFECTION: Primary | ICD-10-CM

## 2023-08-07 DIAGNOSIS — Z3A.10 10 WEEKS GESTATION OF PREGNANCY: ICD-10-CM

## 2023-08-07 LAB
ABO GROUP BLD: NORMAL
BACTERIA UR QL AUTO: ABNORMAL /HPF
BASOPHILS # BLD AUTO: 0.06 10*3/MM3 (ref 0–0.2)
BASOPHILS NFR BLD AUTO: 0.4 % (ref 0–1.5)
BILIRUB UR QL STRIP: NEGATIVE
BLD GP AB SCN SERPL QL: NEGATIVE
C TRACH RRNA CVX QL NAA+PROBE: NOT DETECTED
CLARITY UR: CLEAR
COLOR UR: YELLOW
DEPRECATED RDW RBC AUTO: 43.9 FL (ref 37–54)
EOSINOPHIL # BLD AUTO: 0.25 10*3/MM3 (ref 0–0.4)
EOSINOPHIL NFR BLD AUTO: 1.7 % (ref 0.3–6.2)
ERYTHROCYTE [DISTWIDTH] IN BLOOD BY AUTOMATED COUNT: 13.6 % (ref 12.3–15.4)
GLUCOSE UR STRIP-MCNC: NEGATIVE MG/DL
HCG INTACT+B SERPL-ACNC: NORMAL MIU/ML
HCT VFR BLD AUTO: 39.7 % (ref 34–46.6)
HGB BLD-MCNC: 13.1 G/DL (ref 12–15.9)
HGB UR QL STRIP.AUTO: NEGATIVE
HOLD SPECIMEN: NORMAL
HOLD SPECIMEN: NORMAL
HYALINE CASTS UR QL AUTO: ABNORMAL /LPF
IMM GRANULOCYTES # BLD AUTO: 0.09 10*3/MM3 (ref 0–0.05)
IMM GRANULOCYTES NFR BLD AUTO: 0.6 % (ref 0–0.5)
KETONES UR QL STRIP: NEGATIVE
LEUKOCYTE ESTERASE UR QL STRIP.AUTO: ABNORMAL
LYMPHOCYTES # BLD AUTO: 2.7 10*3/MM3 (ref 0.7–3.1)
LYMPHOCYTES NFR BLD AUTO: 18.2 % (ref 19.6–45.3)
MCH RBC QN AUTO: 28.9 PG (ref 26.6–33)
MCHC RBC AUTO-ENTMCNC: 33 G/DL (ref 31.5–35.7)
MCV RBC AUTO: 87.6 FL (ref 79–97)
MONOCYTES # BLD AUTO: 0.68 10*3/MM3 (ref 0.1–0.9)
MONOCYTES NFR BLD AUTO: 4.6 % (ref 5–12)
N GONORRHOEA RRNA SPEC QL NAA+PROBE: NOT DETECTED
NEUTROPHILS NFR BLD AUTO: 11.06 10*3/MM3 (ref 1.7–7)
NEUTROPHILS NFR BLD AUTO: 74.5 % (ref 42.7–76)
NITRITE UR QL STRIP: NEGATIVE
NRBC BLD AUTO-RTO: 0 /100 WBC (ref 0–0.2)
NUMBER OF DOSES: NORMAL
PH UR STRIP.AUTO: 6.5 [PH] (ref 5–8)
PLATELET # BLD AUTO: 326 10*3/MM3 (ref 140–450)
PMV BLD AUTO: 10.6 FL (ref 6–12)
PROT UR QL STRIP: NEGATIVE
RBC # BLD AUTO: 4.53 10*6/MM3 (ref 3.77–5.28)
RBC # UR STRIP: ABNORMAL /HPF
REF LAB TEST METHOD: ABNORMAL
RH BLD: POSITIVE
SP GR UR STRIP: 1.02 (ref 1–1.03)
SQUAMOUS #/AREA URNS HPF: ABNORMAL /HPF
UROBILINOGEN UR QL STRIP: ABNORMAL
WBC # UR STRIP: ABNORMAL /HPF
WBC NRBC COR # BLD: 14.84 10*3/MM3 (ref 3.4–10.8)
WHOLE BLOOD HOLD COAG: NORMAL
WHOLE BLOOD HOLD SPECIMEN: NORMAL

## 2023-08-07 PROCEDURE — 86850 RBC ANTIBODY SCREEN: CPT | Performed by: PHYSICIAN ASSISTANT

## 2023-08-07 PROCEDURE — 87491 CHLMYD TRACH DNA AMP PROBE: CPT | Performed by: NURSE PRACTITIONER

## 2023-08-07 PROCEDURE — 76801 OB US < 14 WKS SINGLE FETUS: CPT

## 2023-08-07 PROCEDURE — 87591 N.GONORRHOEAE DNA AMP PROB: CPT | Performed by: NURSE PRACTITIONER

## 2023-08-07 PROCEDURE — 86901 BLOOD TYPING SEROLOGIC RH(D): CPT | Performed by: PHYSICIAN ASSISTANT

## 2023-08-07 PROCEDURE — 76801 OB US < 14 WKS SINGLE FETUS: CPT | Performed by: RADIOLOGY

## 2023-08-07 PROCEDURE — 36415 COLL VENOUS BLD VENIPUNCTURE: CPT

## 2023-08-07 PROCEDURE — 25010000002 METRONIDAZOLE 500 MG/100ML SOLUTION: Performed by: NURSE PRACTITIONER

## 2023-08-07 PROCEDURE — 81001 URINALYSIS AUTO W/SCOPE: CPT | Performed by: EMERGENCY MEDICINE

## 2023-08-07 PROCEDURE — 99284 EMERGENCY DEPT VISIT MOD MDM: CPT

## 2023-08-07 PROCEDURE — 85025 COMPLETE CBC W/AUTO DIFF WBC: CPT | Performed by: PHYSICIAN ASSISTANT

## 2023-08-07 PROCEDURE — 96365 THER/PROPH/DIAG IV INF INIT: CPT

## 2023-08-07 PROCEDURE — 86900 BLOOD TYPING SEROLOGIC ABO: CPT | Performed by: PHYSICIAN ASSISTANT

## 2023-08-07 PROCEDURE — 84702 CHORIONIC GONADOTROPIN TEST: CPT | Performed by: PHYSICIAN ASSISTANT

## 2023-08-07 RX ORDER — SODIUM CHLORIDE 0.9 % (FLUSH) 0.9 %
10 SYRINGE (ML) INJECTION AS NEEDED
Status: DISCONTINUED | OUTPATIENT
Start: 2023-08-07 | End: 2023-08-07 | Stop reason: HOSPADM

## 2023-08-07 RX ORDER — METRONIDAZOLE 500 MG/100ML
500 INJECTION, SOLUTION INTRAVENOUS ONCE
Status: COMPLETED | OUTPATIENT
Start: 2023-08-07 | End: 2023-08-07

## 2023-08-07 RX ORDER — METRONIDAZOLE 500 MG/1
500 TABLET ORAL 3 TIMES DAILY
Qty: 21 TABLET | Refills: 0 | Status: SHIPPED | OUTPATIENT
Start: 2023-08-07 | End: 2023-08-14

## 2023-08-07 RX ADMIN — METRONIDAZOLE 500 MG: 500 INJECTION, SOLUTION INTRAVENOUS at 16:30

## 2023-08-07 RX ADMIN — SODIUM CHLORIDE, POTASSIUM CHLORIDE, SODIUM LACTATE AND CALCIUM CHLORIDE 1000 ML: 600; 310; 30; 20 INJECTION, SOLUTION INTRAVENOUS at 16:27

## 2023-08-07 NOTE — ED NOTES
MEDICAL SCREENING:    Reason for Visit: vaginal bleeding, pregnancy.     Patient initially seen in triage.  The patient was advised further evaluation and diagnostic testing will be needed, some of the treatment and testing will be initiated in the lobby in order to begin the process.  The patient will be returned to the waiting area for the time being and possibly be re-assessed by a subsequent ED provider.  The patient will be brought back to the treatment area in as timely manner as possible.      Arnoldo Nix PA-C  08/07/23 1418

## 2023-08-08 LAB
EXTERNAL HEPATITIS B SURFACE ANTIGEN: NEGATIVE
EXTERNAL RUBELLA QUALITATIVE: NORMAL
EXTERNAL SYPHILIS RPR SCREEN: NORMAL
HIV1 P24 AG SERPL QL IA: NORMAL

## 2023-08-08 NOTE — ED PROVIDER NOTES
Subjective   History of Present Illness  Patient is a 37-year-old female with significant past medical history positive for PID, hypertension, hepatitis C, anxiety presenting to the ER complaints of vaginal discharge and pelvic pain.  Patient reports she is also approximately 10 weeks pregnant.  Patient denies any dysuria, vaginal bleeding, cough, nausea, vomiting, shortness of air, dysuria or any additional symptoms today.  Patient reports that she was walking a lot and got in a hot tub in [place] and since he she has been having the symptoms.  Patient has no known STD exposure.  Patient reports that she just wants to make sure the baby is okay.  Patient denies any additional symptoms at this time.    History provided by:  Patient   used: No      Review of Systems   Constitutional: Negative.  Negative for fever.   HENT: Negative.     Respiratory: Negative.     Cardiovascular: Negative.  Negative for chest pain.   Gastrointestinal: Negative.  Negative for abdominal pain.   Endocrine: Negative.    Genitourinary:  Positive for pelvic pain and vaginal discharge. Negative for dysuria.   Skin: Negative.    Neurological: Negative.    Psychiatric/Behavioral: Negative.     All other systems reviewed and are negative.    Past Medical History:   Diagnosis Date    Anxiety     COVID-19     Depression     Hepatitis C     Horseshoe kidney     Hypertension     PID (pelvic inflammatory disease)        No Known Allergies    Past Surgical History:   Procedure Laterality Date     SECTION       SECTION N/A 2020    Procedure:  SECTION REPEAT;  Surgeon: Sonya Barton DO;  Location: [facility] LABOR DELIVERY;  Service: Obstetrics/Gynecology;  Laterality: N/A;       Family History   Problem Relation Age of Onset    Alcohol abuse Father     Depression Father     Hypertension Father     Stroke Father     COPD Mother     Diabetes Mother     Hypertension Mother     Alcohol abuse  Paternal Grandfather     Kidney disease Paternal Grandfather     Stroke Paternal Grandfather     Kidney cancer Paternal Grandmother     Coronary artery disease Paternal Grandmother     Hypertension Paternal Grandmother     Asthma Maternal Grandmother     Kidney cancer Maternal Grandmother     Depression Maternal Grandmother     Diabetes Maternal Grandmother     Hypertension Maternal Grandmother     Kidney disease Maternal Grandmother     Alcohol abuse Maternal Grandfather     Lung cancer Maternal Grandfather     Breast cancer Neg Hx        Social History     Socioeconomic History    Marital status:    Tobacco Use    Smoking status: Every Day     Packs/day: 0.50     Types: Cigarettes    Smokeless tobacco: Never   Vaping Use    Vaping Use: Never used   Substance and Sexual Activity    Alcohol use: No    Drug use: Yes     Types: Methamphetamines, Oxycodone, Amphetamines, Marijuana, Benzodiazepines     Comment: suboxone, neurontin SOBER SINCE JUNE 2020    Sexual activity: Yes           Objective   Physical Exam  Vitals and nursing note reviewed.   Constitutional:       General: She is not in acute distress.     Appearance: She is well-developed. She is not diaphoretic.   HENT:      Head: Normocephalic and atraumatic.      Right Ear: External ear normal.      Left Ear: External ear normal.      Nose: Nose normal.   Eyes:      Conjunctiva/sclera: Conjunctivae normal.      Pupils: Pupils are equal, round, and reactive to light.   Neck:      Vascular: No JVD.      Trachea: No tracheal deviation.   Cardiovascular:      Rate and Rhythm: Normal rate and regular rhythm.      Heart sounds: Normal heart sounds. No murmur heard.  Pulmonary:      Effort: Pulmonary effort is normal. No respiratory distress.      Breath sounds: Normal breath sounds. No wheezing.   Abdominal:      General: Bowel sounds are normal.      Palpations: Abdomen is soft.      Tenderness: There is no abdominal tenderness.   Musculoskeletal:          General: No deformity. Normal range of motion.      Cervical back: Normal range of motion and neck supple.   Skin:     General: Skin is warm and dry.      Coloration: Skin is not pale.      Findings: No erythema or rash.   Neurological:      Mental Status: She is alert and oriented to person, place, and time.      Cranial Nerves: No cranial nerve deficit.   Psychiatric:         Behavior: Behavior normal.         Thought Content: Thought content normal.       Procedures         Results for orders placed or performed during the hospital encounter of 08/07/23   Chlamydia trachomatis, Neisseria gonorrhoeae, PCR - Urine, Urine, Random Void    Specimen: Urine, Random Void   Result Value Ref Range    Chlamydia DNA by PCR Not Detected Not Detected    Neisseria gonorrhoeae by PCR Not Detected Not Detected   hCG, Quantitative, Pregnancy    Specimen: Arm, Left; Blood   Result Value Ref Range    HCG Quantitative 71,209.00 mIU/mL   CBC Auto Differential    Specimen: Arm, Left; Blood   Result Value Ref Range    WBC 14.84 (H) 3.40 - 10.80 10*3/mm3    RBC 4.53 3.77 - 5.28 10*6/mm3    Hemoglobin 13.1 12.0 - 15.9 g/dL    Hematocrit 39.7 34.0 - 46.6 %    MCV 87.6 79.0 - 97.0 fL    MCH 28.9 26.6 - 33.0 pg    MCHC 33.0 31.5 - 35.7 g/dL    RDW 13.6 12.3 - 15.4 %    RDW-SD 43.9 37.0 - 54.0 fl    MPV 10.6 6.0 - 12.0 fL    Platelets 326 140 - 450 10*3/mm3    Neutrophil % 74.5 42.7 - 76.0 %    Lymphocyte % 18.2 (L) 19.6 - 45.3 %    Monocyte % 4.6 (L) 5.0 - 12.0 %    Eosinophil % 1.7 0.3 - 6.2 %    Basophil % 0.4 0.0 - 1.5 %    Immature Grans % 0.6 (H) 0.0 - 0.5 %    Neutrophils, Absolute 11.06 (H) 1.70 - 7.00 10*3/mm3    Lymphocytes, Absolute 2.70 0.70 - 3.10 10*3/mm3    Monocytes, Absolute 0.68 0.10 - 0.90 10*3/mm3    Eosinophils, Absolute 0.25 0.00 - 0.40 10*3/mm3    Basophils, Absolute 0.06 0.00 - 0.20 10*3/mm3    Immature Grans, Absolute 0.09 (H) 0.00 - 0.05 10*3/mm3    nRBC 0.0 0.0 - 0.2 /100 WBC   Urinalysis With Culture If Indicated -  Urine, Clean Catch    Specimen: Urine, Clean Catch   Result Value Ref Range    Color, UA Yellow Yellow, Straw    Appearance, UA Clear Clear    pH, UA 6.5 5.0 - 8.0    Specific Gravity, UA 1.018 1.005 - 1.030    Glucose, UA Negative Negative    Ketones, UA Negative Negative    Bilirubin, UA Negative Negative    Blood, UA Negative Negative    Protein, UA Negative Negative    Leuk Esterase, UA Trace (A) Negative    Nitrite, UA Negative Negative    Urobilinogen, UA 0.2 E.U./dL 0.2 - 1.0 E.U./dL   Urinalysis, Microscopic Only - Urine, Clean Catch    Specimen: Urine, Clean Catch   Result Value Ref Range    RBC, UA 3-5 (A) None Seen, 0-2 /HPF    WBC, UA 0-2 None Seen, 0-2 /HPF    Bacteria, UA None Seen None Seen /HPF    Squamous Epithelial Cells, UA 0-2 None Seen, 0-2 /HPF    Hyaline Casts, UA None Seen None Seen /LPF    Methodology Automated Microscopy     RhIg Evaluation    Specimen: Arm, Left; Blood   Result Value Ref Range    ABO Type O     RH type Positive     Antibody Screen Negative    Doses of Rh Immune Globulin    Specimen: Arm, Left; Blood   Result Value Ref Range    Number of Doses  RhIg is not Indicated, RhIg is not indicated due to the patient's Rh status     RhIg is not indicated due to the patient's Rh status   Green Top (Gel)   Result Value Ref Range    Extra Tube Hold for add-ons.    Lavender Top   Result Value Ref Range    Extra Tube hold for add-on    Gold Top - SST   Result Value Ref Range    Extra Tube Hold for add-ons.    Light Blue Top   Result Value Ref Range    Extra Tube Hold for add-ons.       US Ob < 14 Weeks Single or First Gestation   Final Result   1.  Single living intrauterine gestation that is approximately 10 weeks,   0 days gestational age.   2.  Fetal heart rate: 174 bpm.   3.  Nonvisualization of the right ovary.   4.  Otherwise unremarkable exam.       This report was finalized on 2023 3:16 PM by Dr. Leonid Puente MD.             ED Course  ED Course as of 23 4890    Mon Aug 07, 2023   1533  Ob < 14 Weeks Single or First Gestation  IMPRESSION:  1.  Single living intrauterine gestation that is approximately 10 weeks,  0 days gestational age.  2.  Fetal heart rate: 174 bpm.  3.  Nonvisualization of the right ovary.  4.  Otherwise unremarkable exam.     This report was finalized on 8/7/2023 3:16 PM by Dr. Leonid Puente MD.   []   1540 HCG Quantitative: 71,209.00 []      ED Course User Index  [SM] Ria Manley, FLORENTIN                                           Medical Decision Making  Patient is a 37-year-old female with significant past medical history positive for PID, hypertension, hepatitis C, anxiety presenting to the ER complaints of vaginal discharge and pelvic pain.  Patient reports she is also approximately 10 weeks pregnant.  Patient denies any dysuria, vaginal bleeding, cough, nausea, vomiting, shortness of air, dysuria or any additional symptoms today.  Patient reports that she was walking a lot and got in a hot tub in [place] and since he she has been having the symptoms.  Patient has no known STD exposure.  Patient reports that she just wants to make sure the baby is okay.  Patient denies any additional symptoms at this time.    Advised patient to return to the ER with new or worsening symptoms.  Advised patient to follow-up with PCP.  Patient verbalized understanding and agrees.  Vital signs are stable at discharge.  Patient is in no acute distress.    Problems Addressed:  10 weeks gestation of pregnancy: complicated acute illness or injury  Yeast infection: complicated acute illness or injury    Amount and/or Complexity of Data Reviewed  Labs: ordered. Decision-making details documented in ED Course.  Radiology: ordered. Decision-making details documented in ED Course.    Risk  Prescription drug management.        Final diagnoses:   Yeast infection   10 weeks gestation of pregnancy       ED Disposition  ED Disposition       ED Disposition   Discharge     Condition   Stable    Comment   --               Keily Galindo, APRN  [address]  [facility] [address]  [phone]    Schedule an appointment as soon as possible for a visit in 2 days      [facility] [place] [id number] [place] [id number]  [phone]  Schedule an appointment as soon as possible for a visit in 2 days           Medication List        New Prescriptions      metroNIDAZOLE 500 MG tablet  Commonly known as: FLAGYL  Take 1 tablet by mouth 3 (Three) Times a Day for 7 days.               Where to Get Your Medications        These medications were sent to [place], [place] - [address] - [phone]  - [phone]   [address], [facility] [address]      Phone: [phone]   metroNIDAZOLE 500 MG tablet            Ria Manley, APRN  08/07/23 9580

## 2023-09-23 ENCOUNTER — HOSPITAL ENCOUNTER (EMERGENCY)
Facility: HOSPITAL | Age: 38
Discharge: HOME | End: 2023-09-23
Attending: STUDENT IN AN ORGANIZED HEALTH CARE EDUCATION/TRAINING PROGRAM
Payer: MEDICAID

## 2023-09-23 ENCOUNTER — APPOINTMENT (OUTPATIENT)
Dept: ULTRASOUND IMAGING | Facility: HOSPITAL | Age: 38
End: 2023-09-23
Payer: MEDICAID

## 2023-09-23 VITALS
WEIGHT: 172 LBS | HEART RATE: 78 BPM | TEMPERATURE: 98 F | SYSTOLIC BLOOD PRESSURE: 157 MMHG | BODY MASS INDEX: 30.48 KG/M2 | HEIGHT: 63 IN | RESPIRATION RATE: 18 BRPM | OXYGEN SATURATION: 98 % | DIASTOLIC BLOOD PRESSURE: 106 MMHG

## 2023-09-23 DIAGNOSIS — O16.2 HYPERTENSION AFFECTING PREGNANCY IN SECOND TRIMESTER: ICD-10-CM

## 2023-09-23 DIAGNOSIS — Z3A.18 18 WEEKS GESTATION OF PREGNANCY: Primary | ICD-10-CM

## 2023-09-23 LAB
ALBUMIN SERPL-MCNC: 3.6 G/DL (ref 3.5–5.2)
ALBUMIN/GLOB SERPL: 1 G/DL
ALP SERPL-CCNC: 73 U/L (ref 39–117)
ALT SERPL W P-5'-P-CCNC: 13 U/L (ref 1–33)
AMPHET+METHAMPHET UR QL: NEGATIVE
AMPHETAMINES UR QL: NEGATIVE
ANION GAP SERPL CALCULATED.3IONS-SCNC: 12.9 MMOL/L (ref 5–15)
AST SERPL-CCNC: 20 U/L (ref 1–32)
B-HCG UR QL: POSITIVE
BACTERIA UR QL AUTO: ABNORMAL /HPF
BARBITURATES UR QL SCN: NEGATIVE
BASOPHILS # BLD AUTO: 0.05 10*3/MM3 (ref 0–0.2)
BASOPHILS NFR BLD AUTO: 0.3 % (ref 0–1.5)
BENZODIAZ UR QL SCN: NEGATIVE
BILIRUB SERPL-MCNC: <0.2 MG/DL (ref 0–1.2)
BILIRUB UR QL STRIP: NEGATIVE
BUN SERPL-MCNC: 14 MG/DL (ref 6–20)
BUN/CREAT SERPL: 21.2 (ref 7–25)
BUPRENORPHINE SERPL-MCNC: NEGATIVE NG/ML
CALCIUM SPEC-SCNC: 9.2 MG/DL (ref 8.6–10.5)
CANNABINOIDS SERPL QL: NEGATIVE
CHLORIDE SERPL-SCNC: 105 MMOL/L (ref 98–107)
CLARITY UR: CLEAR
CO2 SERPL-SCNC: 19.1 MMOL/L (ref 22–29)
COCAINE UR QL: NEGATIVE
COLOR UR: YELLOW
CREAT SERPL-MCNC: 0.66 MG/DL (ref 0.57–1)
CRP SERPL-MCNC: <0.3 MG/DL (ref 0–0.5)
DEPRECATED RDW RBC AUTO: 43.3 FL (ref 37–54)
EGFRCR SERPLBLD CKD-EPI 2021: 116.8 ML/MIN/1.73
EOSINOPHIL # BLD AUTO: 0.21 10*3/MM3 (ref 0–0.4)
EOSINOPHIL NFR BLD AUTO: 1.2 % (ref 0.3–6.2)
ERYTHROCYTE [DISTWIDTH] IN BLOOD BY AUTOMATED COUNT: 13.3 % (ref 12.3–15.4)
ERYTHROCYTE [SEDIMENTATION RATE] IN BLOOD: 21 MM/HR (ref 0–20)
FENTANYL UR-MCNC: NEGATIVE NG/ML
GLOBULIN UR ELPH-MCNC: 3.5 GM/DL
GLUCOSE SERPL-MCNC: 100 MG/DL (ref 65–99)
GLUCOSE UR STRIP-MCNC: NEGATIVE MG/DL
HCT VFR BLD AUTO: 37.9 % (ref 34–46.6)
HGB BLD-MCNC: 12.7 G/DL (ref 12–15.9)
HGB UR QL STRIP.AUTO: NEGATIVE
HYALINE CASTS UR QL AUTO: ABNORMAL /LPF
IMM GRANULOCYTES # BLD AUTO: 0.13 10*3/MM3 (ref 0–0.05)
IMM GRANULOCYTES NFR BLD AUTO: 0.7 % (ref 0–0.5)
KETONES UR QL STRIP: NEGATIVE
LEUKOCYTE ESTERASE UR QL STRIP.AUTO: ABNORMAL
LYMPHOCYTES # BLD AUTO: 2.48 10*3/MM3 (ref 0.7–3.1)
LYMPHOCYTES NFR BLD AUTO: 13.9 % (ref 19.6–45.3)
MCH RBC QN AUTO: 29.8 PG (ref 26.6–33)
MCHC RBC AUTO-ENTMCNC: 33.5 G/DL (ref 31.5–35.7)
MCV RBC AUTO: 89 FL (ref 79–97)
METHADONE UR QL SCN: NEGATIVE
MONOCYTES # BLD AUTO: 0.91 10*3/MM3 (ref 0.1–0.9)
MONOCYTES NFR BLD AUTO: 5.1 % (ref 5–12)
NEUTROPHILS NFR BLD AUTO: 14.03 10*3/MM3 (ref 1.7–7)
NEUTROPHILS NFR BLD AUTO: 78.8 % (ref 42.7–76)
NITRITE UR QL STRIP: NEGATIVE
NRBC BLD AUTO-RTO: 0 /100 WBC (ref 0–0.2)
OPIATES UR QL: NEGATIVE
OXYCODONE UR QL SCN: NEGATIVE
PCP UR QL SCN: NEGATIVE
PH UR STRIP.AUTO: 7 [PH] (ref 5–8)
PLATELET # BLD AUTO: 293 10*3/MM3 (ref 140–450)
PMV BLD AUTO: 11 FL (ref 6–12)
POTASSIUM SERPL-SCNC: 4.4 MMOL/L (ref 3.5–5.2)
PROPOXYPH UR QL: NEGATIVE
PROT SERPL-MCNC: 7.1 G/DL (ref 6–8.5)
PROT UR QL STRIP: NEGATIVE
RBC # BLD AUTO: 4.26 10*6/MM3 (ref 3.77–5.28)
RBC # UR STRIP: ABNORMAL /HPF
REF LAB TEST METHOD: ABNORMAL
SODIUM SERPL-SCNC: 137 MMOL/L (ref 136–145)
SP GR UR STRIP: 1.03 (ref 1–1.03)
SQUAMOUS #/AREA URNS HPF: ABNORMAL /HPF
TRICYCLICS UR QL SCN: NEGATIVE
UROBILINOGEN UR QL STRIP: ABNORMAL
WBC # UR STRIP: ABNORMAL /HPF
WBC NRBC COR # BLD: 17.81 10*3/MM3 (ref 3.4–10.8)

## 2023-09-23 PROCEDURE — 96374 THER/PROPH/DIAG INJ IV PUSH: CPT

## 2023-09-23 PROCEDURE — 25010000002 LABETALOL 5 MG/ML SOLUTION: Performed by: STUDENT IN AN ORGANIZED HEALTH CARE EDUCATION/TRAINING PROGRAM

## 2023-09-23 PROCEDURE — 93010 ELECTROCARDIOGRAM REPORT: CPT | Performed by: INTERNAL MEDICINE

## 2023-09-23 PROCEDURE — 76805 OB US >/= 14 WKS SNGL FETUS: CPT | Performed by: RADIOLOGY

## 2023-09-23 PROCEDURE — 81025 URINE PREGNANCY TEST: CPT | Performed by: PHYSICIAN ASSISTANT

## 2023-09-23 PROCEDURE — 76805 OB US >/= 14 WKS SNGL FETUS: CPT

## 2023-09-23 PROCEDURE — 80053 COMPREHEN METABOLIC PANEL: CPT | Performed by: PHYSICIAN ASSISTANT

## 2023-09-23 PROCEDURE — 99284 EMERGENCY DEPT VISIT MOD MDM: CPT

## 2023-09-23 PROCEDURE — 81001 URINALYSIS AUTO W/SCOPE: CPT | Performed by: STUDENT IN AN ORGANIZED HEALTH CARE EDUCATION/TRAINING PROGRAM

## 2023-09-23 PROCEDURE — 80307 DRUG TEST PRSMV CHEM ANLYZR: CPT | Performed by: STUDENT IN AN ORGANIZED HEALTH CARE EDUCATION/TRAINING PROGRAM

## 2023-09-23 PROCEDURE — 86140 C-REACTIVE PROTEIN: CPT | Performed by: PHYSICIAN ASSISTANT

## 2023-09-23 PROCEDURE — 85025 COMPLETE CBC W/AUTO DIFF WBC: CPT | Performed by: PHYSICIAN ASSISTANT

## 2023-09-23 PROCEDURE — 85652 RBC SED RATE AUTOMATED: CPT | Performed by: PHYSICIAN ASSISTANT

## 2023-09-23 PROCEDURE — 93005 ELECTROCARDIOGRAM TRACING: CPT | Performed by: STUDENT IN AN ORGANIZED HEALTH CARE EDUCATION/TRAINING PROGRAM

## 2023-09-23 RX ORDER — FLUCONAZOLE 100 MG/1
200 TABLET ORAL ONCE
Status: COMPLETED | OUTPATIENT
Start: 2023-09-23 | End: 2023-09-23

## 2023-09-23 RX ORDER — LABETALOL HYDROCHLORIDE 5 MG/ML
10 INJECTION, SOLUTION INTRAVENOUS ONCE
Status: COMPLETED | OUTPATIENT
Start: 2023-09-23 | End: 2023-09-23

## 2023-09-23 RX ORDER — HYDRALAZINE HYDROCHLORIDE 25 MG/1
25 TABLET, FILM COATED ORAL 2 TIMES DAILY
Qty: 60 TABLET | Refills: 0 | Status: ON HOLD | OUTPATIENT
Start: 2023-09-23

## 2023-09-23 RX ORDER — SODIUM CHLORIDE 0.9 % (FLUSH) 0.9 %
10 SYRINGE (ML) INJECTION AS NEEDED
Status: DISCONTINUED | OUTPATIENT
Start: 2023-09-23 | End: 2023-09-23 | Stop reason: HOSPADM

## 2023-09-23 RX ORDER — CLONIDINE HYDROCHLORIDE 0.1 MG/1
0.1 TABLET ORAL ONCE
Status: DISCONTINUED | OUTPATIENT
Start: 2023-09-23 | End: 2023-09-23

## 2023-09-23 RX ORDER — HYDRALAZINE HYDROCHLORIDE 10 MG/1
20 TABLET, FILM COATED ORAL ONCE
Status: COMPLETED | OUTPATIENT
Start: 2023-09-23 | End: 2023-09-23

## 2023-09-23 RX ADMIN — HYDRALAZINE HYDROCHLORIDE 20 MG: 10 TABLET, FILM COATED ORAL at 20:55

## 2023-09-23 RX ADMIN — SODIUM CHLORIDE 1000 ML: 9 INJECTION, SOLUTION INTRAVENOUS at 18:10

## 2023-09-23 RX ADMIN — LABETALOL HYDROCHLORIDE 10 MG: 5 INJECTION, SOLUTION INTRAVENOUS at 19:32

## 2023-09-23 RX ADMIN — FLUCONAZOLE 200 MG: 100 TABLET ORAL at 21:03

## 2023-09-23 NOTE — ED PROVIDER NOTES
Subjective   History of Present Illness  37-year-old female who presents to the emergency department with chief complaint right lower quadrant abdominal pain.  Patient states that she is approximately 16 weeks pregnant.  Patient has history of hepatitis C, depression, anxiety, COVID-19.  Patient denies any associated nausea, vomiting.  Patient is followed by [facility].    History provided by:  Patient   used: No    Abdominal Pain  Pain location:  RLQ  Pain quality: sharp and stabbing    Pain radiates to:  Does not radiate  Pain severity:  Moderate  Onset quality:  Gradual  Duration:  2 days  Timing:  Intermittent  Progression:  Worsening  Chronicity:  New  Context: not awakening from sleep, not diet changes, not eating, not previous surgeries, not recent sexual activity, not sick contacts and not suspicious food intake    Relieved by:  Nothing  Worsened by:  Nothing  Ineffective treatments:  None tried  Associated symptoms: chills    Associated symptoms: no cough, no fatigue, no fever, no flatus, no nausea and no shortness of breath    Risk factors: no alcohol abuse, no aspirin use, not elderly, has not had multiple surgeries, no NSAID use and not pregnant      Review of Systems   Constitutional:  Positive for chills. Negative for fatigue and fever.   Eyes: Negative.  Negative for pain, redness and itching.   Respiratory: Negative.  Negative for cough, choking, chest tightness and shortness of breath.    Gastrointestinal:  Positive for abdominal pain. Negative for flatus and nausea.   Endocrine: Negative for heat intolerance, polydipsia and polyphagia.   Musculoskeletal: Negative.  Negative for back pain and gait problem.   Skin: Negative.  Negative for pallor and rash.   Neurological: Negative.  Negative for seizures, speech difficulty, numbness and headaches.   Hematological: Negative.    Psychiatric/Behavioral: Negative.  Negative for behavioral problems, confusion, decreased  concentration and hallucinations.    All other systems reviewed and are negative.    Past Medical History:   Diagnosis Date    Anxiety     COVID-19     Depression     Hepatitis C     Horseshoe kidney     Hypertension     PID (pelvic inflammatory disease)        No Known Allergies    Past Surgical History:   Procedure Laterality Date     SECTION       SECTION N/A 2020    Procedure:  SECTION REPEAT;  Surgeon: Sonya Barton DO;  Location: [facility] LABOR DELIVERY;  Service: Obstetrics/Gynecology;  Laterality: N/A;       Family History   Problem Relation Age of Onset    Alcohol abuse Father     Depression Father     Hypertension Father     Stroke Father     COPD Mother     Diabetes Mother     Hypertension Mother     Alcohol abuse Paternal Grandfather     Kidney disease Paternal Grandfather     Stroke Paternal Grandfather     Kidney cancer Paternal Grandmother     Coronary artery disease Paternal Grandmother     Hypertension Paternal Grandmother     Asthma Maternal Grandmother     Kidney cancer Maternal Grandmother     Depression Maternal Grandmother     Diabetes Maternal Grandmother     Hypertension Maternal Grandmother     Kidney disease Maternal Grandmother     Alcohol abuse Maternal Grandfather     Lung cancer Maternal Grandfather     Breast cancer Neg Hx        Social History     Socioeconomic History    Marital status:    Tobacco Use    Smoking status: Every Day     Packs/day: 0.50     Types: Cigarettes    Smokeless tobacco: Never   Vaping Use    Vaping Use: Never used   Substance and Sexual Activity    Alcohol use: No    Drug use: Yes     Types: Methamphetamines, Oxycodone, Amphetamines, Marijuana, Benzodiazepines     Comment: suboxone, neurontin SOBER SINCE 2020    Sexual activity: Yes           Objective   Physical Exam  Vitals and nursing note reviewed.   Constitutional:       General: She is not in acute distress.     Appearance: Normal appearance. She is  well-developed and normal weight. She is not ill-appearing, toxic-appearing or diaphoretic.   HENT:      Head: Normocephalic and atraumatic.      Right Ear: Tympanic membrane, ear canal and external ear normal. There is no impacted cerumen.      Left Ear: Tympanic membrane, ear canal and external ear normal.      Nose: Nose normal. No congestion or rhinorrhea.      Mouth/Throat:      Mouth: Mucous membranes are moist.      Pharynx: Oropharynx is clear.   Eyes:      General: No scleral icterus.        Right eye: No discharge.         Left eye: No discharge.      Conjunctiva/sclera: Conjunctivae normal.      Pupils: Pupils are equal, round, and reactive to light.   Cardiovascular:      Rate and Rhythm: Normal rate and regular rhythm.      Heart sounds: Normal heart sounds. No murmur heard.    No gallop.   Pulmonary:      Effort: Pulmonary effort is normal. No respiratory distress.      Breath sounds: Rhonchi present. No wheezing.   Abdominal:      General: Bowel sounds are normal.      Palpations: Abdomen is soft.      Tenderness: There is generalized abdominal tenderness. There is no rebound.      Hernia: No hernia is present.   Musculoskeletal:         General: No swelling, tenderness, deformity or signs of injury. Normal range of motion.      Cervical back: Normal range of motion and neck supple. No rigidity.      Right lower leg: No edema.   Lymphadenopathy:      Cervical: No cervical adenopathy.   Skin:     General: Skin is warm and dry.      Coloration: Skin is not jaundiced or pale.      Findings: No bruising, erythema or lesion.   Neurological:      General: No focal deficit present.      Mental Status: She is alert and oriented to person, place, and time.      Cranial Nerves: No cranial nerve deficit.      Sensory: No sensory deficit.      Motor: No weakness.      Coordination: Coordination normal.   Psychiatric:         Behavior: Behavior normal.         Thought Content: Thought content normal.          Judgment: Judgment normal.       Procedures           ED Course  ED Course as of 09/24/23 2138   Sat Sep 23, 2023   2041 ECG 12 Lead Other; bp  Normal sinus rhythm rate 77  QRS 76 QTc 497.  Electronically signed by Nori Mccoy DO, 09/23/23, 8:42 PM EDT.   [LK]      ED Course User Index  [LK] Nori Mccoy DO                                           Medical Decision Making  Problems Addressed:  18 weeks gestation of pregnancy: complicated acute illness or injury  Hypertension affecting pregnancy in second trimester: complicated acute illness or injury    Amount and/or Complexity of Data Reviewed  Labs: ordered.  Radiology: ordered.    Risk  Prescription drug management.        Final diagnoses:   18 weeks gestation of pregnancy   Hypertension affecting pregnancy in second trimester       ED Disposition  ED Disposition       ED Disposition   Discharge    Condition   Stable    Comment   --               Marlo Mueller DO  [address]  [address]  [facility] [address] [phone]    Schedule an appointment as soon as possible for a visit in 2 days           Medication List        New Prescriptions      hydrALAZINE 25 MG tablet  Commonly known as: APRESOLINE  Take 1 tablet by mouth 2 (Two) Times a Day.               Where to Get Your Medications        These medications were sent to [place], [place] - [address] - [phone]  - [phone]   [address] [facility] [facility] [address] [facility] [address]      Phone: [phone]   hydrALAZINE 25 MG tablet            Arnoldo Nix PA-C  09/24/23 2137       Arnoldo Nix PA-C  09/24/23 2138

## 2023-09-23 NOTE — ED NOTES
pt reports  she takes labetalol 100mg po daily but ran out at home, BP remains elevated, TED Rueda aware, orders noted

## 2023-09-24 LAB
QT INTERVAL: 440 MS
QTC INTERVAL: 497 MS

## 2023-09-25 ENCOUNTER — HOSPITAL ENCOUNTER (OUTPATIENT)
Facility: HOSPITAL | Age: 38
Discharge: HOME | End: 2023-09-26
Attending: OBSTETRICS & GYNECOLOGY | Admitting: OBSTETRICS & GYNECOLOGY
Payer: MEDICAID

## 2023-09-25 PROBLEM — O16.9 ELEVATED BLOOD PRESSURE AFFECTING PREGNANCY, ANTEPARTUM: Status: ACTIVE | Noted: 2023-09-25

## 2023-09-25 PROBLEM — O16.2 HYPERTENSION AFFECTING PREGNANCY IN SECOND TRIMESTER: Status: ACTIVE | Noted: 2023-09-25

## 2023-09-25 PROCEDURE — G0463 HOSPITAL OUTPT CLINIC VISIT: HCPCS

## 2023-09-25 RX ORDER — LABETALOL 300 MG/1
300 TABLET, FILM COATED ORAL 3 TIMES DAILY
Qty: 90 TABLET | Refills: 3 | Status: SHIPPED | OUTPATIENT
Start: 2023-09-25 | End: 2023-09-26 | Stop reason: HOSPADM

## 2023-09-25 RX ORDER — NIFEDIPINE 30 MG/1
30 TABLET, EXTENDED RELEASE ORAL DAILY
Qty: 30 TABLET | Refills: 3 | Status: SHIPPED | OUTPATIENT
Start: 2023-09-25 | End: 2023-09-26 | Stop reason: HOSPADM

## 2023-09-25 RX ORDER — NIFEDIPINE 30 MG/1
30 TABLET, FILM COATED, EXTENDED RELEASE ORAL
Status: DISCONTINUED | OUTPATIENT
Start: 2023-09-25 | End: 2023-09-26

## 2023-09-25 RX ORDER — HYDROXYZINE HYDROCHLORIDE 25 MG/1
50 TABLET, FILM COATED ORAL NIGHTLY PRN
Status: DISCONTINUED | OUTPATIENT
Start: 2023-09-25 | End: 2023-09-26 | Stop reason: HOSPADM

## 2023-09-25 RX ADMIN — LABETALOL HYDROCHLORIDE 300 MG: 100 TABLET, FILM COATED ORAL at 18:25

## 2023-09-25 RX ADMIN — NIFEDIPINE 30 MG: 30 TABLET, EXTENDED RELEASE ORAL at 16:59

## 2023-09-25 RX ADMIN — HYDROXYZINE HYDROCHLORIDE 50 MG: 25 TABLET, FILM COATED ORAL at 23:38

## 2023-09-25 NOTE — LETTER
September 26, 2023     Patient: Kacy Woo   YOB: 1985   Date of Visit: 9/25/2023       To Whom It May Concern:    Kacy Woo was a patient from 9/25/23 - 9/26/23           Sincerely,    Ivon Farah RN,   Dr. Mueller

## 2023-09-26 VITALS
RESPIRATION RATE: 16 BRPM | BODY MASS INDEX: 30 KG/M2 | HEIGHT: 63 IN | DIASTOLIC BLOOD PRESSURE: 69 MMHG | WEIGHT: 169.3 LBS | HEART RATE: 78 BPM | SYSTOLIC BLOOD PRESSURE: 119 MMHG | TEMPERATURE: 97.7 F

## 2023-09-26 PROCEDURE — G0378 HOSPITAL OBSERVATION PER HR: HCPCS

## 2023-09-26 RX ORDER — LABETALOL 200 MG/1
200 TABLET, FILM COATED ORAL EVERY 12 HOURS SCHEDULED
Status: DISCONTINUED | OUTPATIENT
Start: 2023-09-26 | End: 2023-09-26 | Stop reason: HOSPADM

## 2023-09-26 RX ORDER — LABETALOL 200 MG/1
200 TABLET, FILM COATED ORAL EVERY 12 HOURS SCHEDULED
Qty: 60 TABLET | Refills: 6 | Status: SHIPPED | OUTPATIENT
Start: 2023-09-26

## 2023-09-26 RX ORDER — ACETAMINOPHEN 325 MG/1
650 TABLET ORAL EVERY 6 HOURS PRN
Status: DISCONTINUED | OUTPATIENT
Start: 2023-09-26 | End: 2023-09-26 | Stop reason: HOSPADM

## 2023-09-26 RX ADMIN — LABETALOL HYDROCHLORIDE 300 MG: 100 TABLET, FILM COATED ORAL at 05:02

## 2023-09-26 RX ADMIN — ACETAMINOPHEN 650 MG: 325 TABLET ORAL at 09:57

## 2023-09-26 NOTE — H&P
Patient Care Team:  Keily Galindo APRN as PCP - General (Family Medicine)    Chief complaint elevated blood pressure    Subjective     Patient is a 37 y.o. female    4 para 3 at 17 weeks and 4 days who presented from the office yesterday secondary to severely elevated blood pressure.  She has been out of her blood pressure medication for 3 to 4 days and has not taken any since then.  On admission her blood pressures were in the severe range.  She was started on labetalol 300 mg 3 times daily and Procardia XL 30 mg.  Her blood pressure has since been in the 110s over 60s.  She feels weak.  No other complaints.  Review of Systems   Pertinent items are noted in HPI    History  Past Medical History:   Diagnosis Date    Anemia 1986    Anxiety     Anxiety 2002    COVID-19     Depression     Hepatitis C     Horseshoe kidney     HPV (human papilloma virus) infection     Hypertension     Neuropathy     PID (pelvic inflammatory disease)     Scheuermann's disease      Past Surgical History:   Procedure Laterality Date     SECTION  2020     SECTION N/A 2020    Procedure:  SECTION REPEAT;  Surgeon: Sonya Barton DO;  Location: [facility] LABOR DELIVERY;  Service: Obstetrics/Gynecology;  Laterality: N/A;     SECTION  2010     Family History   Problem Relation Age of Onset    Alcohol abuse Father     Depression Father     Hypertension Father     Stroke Father     COPD Mother     Diabetes Mother     Hypertension Mother     Alcohol abuse Paternal Grandfather     Kidney disease Paternal Grandfather     Stroke Paternal Grandfather     Kidney cancer Paternal Grandmother     Coronary artery disease Paternal Grandmother     Hypertension Paternal Grandmother     Asthma Maternal Grandmother     Kidney cancer Maternal Grandmother     Depression Maternal Grandmother     Diabetes Maternal Grandmother     Hypertension Maternal Grandmother     Kidney  disease Maternal Grandmother     Alcohol abuse Maternal Grandfather     Lung cancer Maternal Grandfather     Breast cancer Neg Hx      Social History     Tobacco Use    Smoking status: Every Day     Packs/day: 0.50     Types: Cigarettes    Smokeless tobacco: Never   Vaping Use    Vaping Use: Never used   Substance Use Topics    Alcohol use: No    Drug use: Yes     Types: Methamphetamines, Oxycodone, Amphetamines, Marijuana, Benzodiazepines     Comment: suboxone, neurontin SOBER SINCE JUNE 2020     E-cigarette/Vaping    E-cigarette/Vaping Use Never User      E-cigarette/Vaping Substances     Medications Prior to Admission   Medication Sig Dispense Refill Last Dose    gabapentin (NEURONTIN) 300 MG capsule Take 1 capsule by mouth Every 12 (Twelve) Hours for 3 days. 6 capsule 0     hydrALAZINE (APRESOLINE) 25 MG tablet Take 1 tablet by mouth 2 (Two) Times a Day. 60 tablet 0     lisinopril (PRINIVIL,ZESTRIL) 10 MG tablet Take 1 tablet by mouth Daily for 30 days. 30 tablet 1      Allergies:  Patient has no known allergies.    Objective     Vital Signs  Temp:  [97.7 °F (36.5 °C)-98 °F (36.7 °C)] 97.7 °F (36.5 °C)  Heart Rate:  [71-93] 71  Resp:  [16] 16  BP: (106-200)/() 113/69    Physical Exam:      General Appearance:    Alert, cooperative, in no acute distress   Head:    Normocephalic, without obvious abnormality, atraumatic   Eyes:            Lids and lashes normal, conjunctivae and sclerae normal, no   icterus, no pallor, corneas clear, PERRLA   Ears:    Ears appear intact with no abnormalities noted   Throat:   No oral lesions, no thrush, oral mucosa moist   Neck:   No adenopathy, supple, trachea midline, no thyromegaly, no     carotid bruit, no JVD   Back:     No kyphosis present, no scoliosis present, no skin lesions,       erythema or scars, no tenderness to percussion or                   palpation,   range of motion normal   Lungs:     Clear to auscultation,respirations regular, even and                    unlabored    Heart:    Regular rhythm and normal rate, normal S1 and S2, no            murmur, no gallop, no rub, no click   Breast Exam:    Deferred   Abdomen:     Normal bowel sounds, no masses, no organomegaly, soft        non-tender, non-distended, no guarding, no rebound                 tenderness   Genitalia:    Deferred   Extremities:   Moves all extremities well, no edema, no cyanosis, no              redness   Pulses:   Pulses palpable and equal bilaterally   Skin:   No bleeding, bruising or rash   Lymph nodes:   No palpable adenopathy   Neurologic:   Cranial nerves 2 - 12 grossly intact, sensation intact, DTR        present and equal bilaterally       Results Review:    I reviewed the patient's new clinical results.    Assessment & Plan       Elevated blood pressure affecting pregnancy, antepartum    Hypertension affecting pregnancy in second trimester    This patient has chronic hypertension and has been out of her medication.  Prior to her admission she was taking labetalol 100 mg daily and was having blood pressure in the range of 140s over 100s.  We discussed that labetalol is a twice a day medication and I do think she probably needs to go up.  We will start her on labetalol 200 mg twice daily and see how she does from there and follow-up in the office in 1 week to adjust it if needed.    I discussed the patient's findings and my recommendations with patient.     Marlo Mueller DO  09/26/23  08:31 EDT

## 2023-09-26 NOTE — PLAN OF CARE
Goal Outcome Evaluation:  Plan of Care Reviewed With: patient        Progress: improving  Outcome Evaluation: BP STABLE. PT STATES SHE IS FEELING MUCH BETTER, DENIES HA, CP, OR SOB.

## 2023-09-26 NOTE — DISCHARGE SUMMARY
Date of Discharge: 09/26/23     Discharge Diagnosis:   Chronic hypertension  17-week intrauterine pregnancy    Problem List:    Elevated blood pressure affecting pregnancy, antepartum    Hypertension affecting pregnancy in second trimester      Presenting Problem/History of Present Illness  Elevated blood pressure affecting pregnancy, antepartum [O16.9]  Hypertension affecting pregnancy in second trimester [O16.2]      Hospital Course  Patient is a 37 y.o. female presented with severely elevated blood pressures after running out of her medication.  Her blood pressures here were in the severe range.  She was started on labetalol 300 mg 3 times a day and Procardia XL 30 mg.  Her blood pressure got down to the 110s over 60s and she felt weak.  She had been out of her medication for 3 days prior to this.  I am going to start her on labetalol 200 mg twice daily.  She is to start her first dose tonight.  She will follow-up with us in the office in 1 week.  Procedures Performed         Consults:   Consults       No orders found for last 30 day(s).            Pertinent Test Results:    Condition on Discharge: Stable  Vital Signs  Temp:  [97.7 °F (36.5 °C)-98 °F (36.7 °C)] 97.7 °F (36.5 °C)  Heart Rate:  [71-93] 71  Resp:  [16] 16  BP: (106-200)/() 113/69    Discharge Disposition  Home or Self Care    Discharge Medications     Discharge Medications        New Medications        Instructions Start Date   labetalol 200 MG tablet  Commonly known as: NORMODYNE   200 mg, Oral, Every 12 Hours Scheduled             Stop These Medications      gabapentin 300 MG capsule  Commonly known as: NEURONTIN     hydrALAZINE 25 MG tablet  Commonly known as: APRESOLINE     lisinopril 10 MG tablet  Commonly known as: PRINIVIL,ZESTRIL              Discharge Diet       Activity at Discharge      Follow-up Appointments  No future appointments.        Time: Discharge 15 min

## 2023-10-02 ENCOUNTER — TRANSCRIBE ORDERS (OUTPATIENT)
Dept: OBSTETRICS AND GYNECOLOGY | Facility: HOSPITAL | Age: 38
End: 2023-10-02
Payer: MEDICAID

## 2023-10-02 DIAGNOSIS — Z98.891 HISTORY OF 3 CESAREAN SECTIONS: ICD-10-CM

## 2023-10-02 DIAGNOSIS — O10.919 HTN IN PREGNANCY, CHRONIC: Primary | ICD-10-CM

## 2023-10-02 DIAGNOSIS — O09.899 HISTORY OF PRETERM DELIVERY, CURRENTLY PREGNANT: ICD-10-CM

## 2023-10-02 DIAGNOSIS — O09.529 ANTEPARTUM MULTIGRAVIDA OF ADVANCED MATERNAL AGE: ICD-10-CM

## 2023-10-06 LAB
EXTERNAL CHLAMYDIA SCREEN: NEGATIVE
EXTERNAL GONORRHEA SCREEN: NEGATIVE

## 2023-10-18 ENCOUNTER — OFFICE VISIT (OUTPATIENT)
Dept: OBSTETRICS AND GYNECOLOGY | Facility: HOSPITAL | Age: 38
End: 2023-10-18
Payer: MEDICAID

## 2023-10-18 ENCOUNTER — LAB (OUTPATIENT)
Dept: LAB | Facility: HOSPITAL | Age: 38
End: 2023-10-18
Payer: MEDICAID

## 2023-10-18 ENCOUNTER — HOSPITAL ENCOUNTER (OUTPATIENT)
Dept: WOMENS IMAGING | Facility: HOSPITAL | Age: 38
Discharge: HOME | End: 2023-10-18
Payer: MEDICAID

## 2023-10-18 VITALS — WEIGHT: 180 LBS | BODY MASS INDEX: 31.89 KG/M2 | DIASTOLIC BLOOD PRESSURE: 92 MMHG | SYSTOLIC BLOOD PRESSURE: 140 MMHG

## 2023-10-18 DIAGNOSIS — O10.919 HTN IN PREGNANCY, CHRONIC: ICD-10-CM

## 2023-10-18 DIAGNOSIS — O34.219 PREVIOUS CESAREAN DELIVERY AFFECTING PREGNANCY: ICD-10-CM

## 2023-10-18 DIAGNOSIS — O09.899 HISTORY OF PRETERM DELIVERY, CURRENTLY PREGNANT: ICD-10-CM

## 2023-10-18 DIAGNOSIS — Z34.90 PREGNANCY, UNSPECIFIED GESTATIONAL AGE: ICD-10-CM

## 2023-10-18 DIAGNOSIS — O16.9 ELEVATED BLOOD PRESSURE AFFECTING PREGNANCY, ANTEPARTUM: ICD-10-CM

## 2023-10-18 DIAGNOSIS — O09.529 ANTEPARTUM MULTIGRAVIDA OF ADVANCED MATERNAL AGE: ICD-10-CM

## 2023-10-18 DIAGNOSIS — O09.529 ANTEPARTUM MULTIGRAVIDA OF ADVANCED MATERNAL AGE: Primary | ICD-10-CM

## 2023-10-18 DIAGNOSIS — Z98.891 HISTORY OF 3 CESAREAN SECTIONS: ICD-10-CM

## 2023-10-18 PROCEDURE — 36415 COLL VENOUS BLD VENIPUNCTURE: CPT | Performed by: OBSTETRICS & GYNECOLOGY

## 2023-10-18 PROCEDURE — 76811 OB US DETAILED SNGL FETUS: CPT

## 2023-10-18 RX ORDER — HYDROXYZINE HYDROCHLORIDE 25 MG/1
25 TABLET, FILM COATED ORAL DAILY
COMMUNITY

## 2023-10-18 RX ORDER — PRENATAL VIT/IRON FUM/FOLIC AC 27MG-0.8MG
TABLET ORAL DAILY
COMMUNITY

## 2023-10-18 RX ORDER — NITROFURANTOIN 25; 75 MG/1; MG/1
100 CAPSULE ORAL DAILY
COMMUNITY

## 2023-10-18 RX ORDER — FAMOTIDINE 20 MG/1
20 TABLET, FILM COATED ORAL 2 TIMES DAILY
COMMUNITY

## 2023-10-18 RX ORDER — NIFEDIPINE 30 MG/1
30 TABLET, EXTENDED RELEASE ORAL DAILY
COMMUNITY

## 2023-10-18 NOTE — ASSESSMENT & PLAN NOTE
The patient will be 38 years old at the time of delivery.  She was quoted the following age-related risks at term:  Risk for Down syndrome 1 in 225, risk for any chromosomal abnormality 1 in 125.  Options in genetic testing and genetic screening were discussed with the patient.  I noted an option of genetic testing in the 2nd trimester of transabdominal amniocentesis for the determination of fetal chromosomal complement as well as amniotic fluid alpha-fetoprotein for the evaluation of a fetal open neural tube defect.  A procedure-related fetal loss rate of 1 in 500 would be quoted with midtrimester amniocentesis.  I also discussed the option of analysis of cell-free fetal DNA in maternal blood.  I noted this directed analysis measures the relative proportion of chromosomes with the detection rate of fetal Down syndrome quoted as 99% with a false positive rate of less than .1%.  Screening for other fetal aneuploidies is also possible but the detection rate is lower with cell-free fetal DNA technology.  I then discussed the clinical utility of ultrasound and/or biochemical screening for the detection of fetal aneuploidy as well as fetal open neural tube defects.  Further, I have reviewed her self-reported family history and made suggestions as appropriate based on my review.      After careful consideration, patient elects for cfDNA screen.

## 2023-10-18 NOTE — ASSESSMENT & PLAN NOTE
The frequency of chronic hypertension in pregnancy is estimated at 1% to 5%.  It is more common in older obese women and in women of -American descent.  Preexisting hypertension is a recognized risk factor for preeclampsia.  Superimposed preeclampsia develops in 13-40% of women with chronic hypertension, depending on diagnostic criteria, etiology (essential versus secondary), duration, and the severity of hypertension. A major reason for this wide range in incidence is that the definition of superimposed preeclampsia is used liberally in some studies.  Pregnancies complicated by chronic hypertension are also at an increased risk for abruption placentae with the reported rate in women with mild chronic hypertension ranging from 0.7% to 2.7% and in those with severe hypertension may be as high as 5% to 10%.  Both complications may result in significant maternal, fetal, and  morbidity and mortality.     Women with chronic hypertension who develop superimposed preeclampsia have higher rates of adverse maternal and fetal outcomes, but the independent risks associated with uncomplicated chronic hypertension are less clear.  An analysis of 1,807 deliveries in women with chronic hypertension found that uncomplicated chronic hypertension was still associated with greater risk of  delivery (odds ratio [OR], 2.7) and postpartum hemorrhage (OR, 2.2) compared with women without hypertension (Francoise 2004). Other adverse maternal outcomes in women with chronic hypertension include accelerated hypertension with resultant target organ damage (eg, to the heart, brain, and kidneys), although in the absence of preeclampsia, this is extremely uncommon. Women with higher prepregnancy blood pressure or those with secondary hypertension are at greater risk for development of severe hypertension during pregnancy. Chronic hypertension is associated with an increased risk of gestational diabetes (OR, 1.8).  This may  reflect similar risk factors for both conditions (obesity) as well as similar pathogenetic mechanisms (insulin resistance). The risk of placental abruption is increased threefold in women with chronic hypertension, although most of the increased risk is associated with superimposed preeclampsia (Sibai 1998).       mortality is higher in pregnancies associated with chronic hypertension, most of this increased attributable risk the result of superimposed preeclampsia (Sibai 1998). The relative risk of  death is reported to be approximately 3.6 in women with superimposed preeclampsia compared with those with uncomplicated chronic hypertension (Alan 1994).  death is also higher in women with uncomplicated hypertension compared with normotensive controls (relative risk 2.3) (Alan 1994).     In nonpregnant individuals, long-term blood pressure control can lead to significant reductions in the rates of stroke and cardiovascular morbidity and mortality.  Based on the available data, there is no compelling evidence that short-term antihypertensive therapy is beneficial for the mother or the fetus in the setting of low-risk hypertension except for a reduction in the rate of exacerbation of hypertension. Antihypertensive therapy, however, is necessary in women with severe hypertension to reduce the acute risk for stroke, congestive heart failure, and renal failure.  In addition, control of severe hypertension may permit pregnancy prolongation and thereby improve  outcome.  There is no available evidence, however, that control of severe hypertension reduces the rate of either superimposed preeclampsia or abruptio placentae.  (Hypertension in Pregnancy Task Force.  Obstet Gynecol 2013.)      The use of atenolol during the first and second trimesters has been associated with significantly reduced fetal growth along with decreased placental growth and weight.  Combination alpha- and beta-blocking  agents such as labetalol have been associated with reduced fetal growth but to a lesser extent than that noted with pure beta-blockers such as atenolol.  The available evidence suggests that the use of calcium channel-blockers, particularly nifedipine, in the first trimester has not been associated with increased rates of major birth defects or fetal growth restriction.  Antihypertensive therapy with either nifedipine or labetalol can be initiated if the patient develops severe hypertension before term.  Fetal evaluation for patients requiring antihypertensive therapy should include an ultrasound examination at 18-20 weeks' gestation and repeated at 28 weeks' gestation then monthly thereafter until term.  The development of severe hypertension, preeclampsia or abnormal fetal growth requires immediate fetal testing with NST or BPP. Women who develop severe hypertension and those with documented fetal growth restriction by ultrasound examination require hospitalization and consideration of delivery.  If superimposed preeclampsia is diagnosed at or beyond 37 weeks' gestation, delivery is undertaken.

## 2023-10-18 NOTE — PROGRESS NOTES
"Documentation of the ultrasound findings, images, and interpretations will be available in the patient's Viewpoint report which is located in the imaging tab in chart review.    Maternal/Fetal Medicine Consult Note     Name: Kacy Woo    : 1985     MRN: [id number]     Referring Provider: FLORENTIN Choi    Chief Complaint  No chief complaint on file.    Subjective     History of Present Illness:  Kacy Woo is a 38 y.o.  20w5d who presents today for ama    VIOLA: Estimated Date of Delivery: 3/1/24     ROS:   As noted in HPI.     Past Medical History:   Diagnosis Date    Anemia     Anxiety 2002    COVID-19     Depression     Hepatitis C     Horseshoe kidney     HPV (human papilloma virus) infection     Hypertension     Neuropathy     PID (pelvic inflammatory disease)     Scheuermann's disease       Past Surgical History:   Procedure Laterality Date     SECTION  2020     SECTION N/A 2020    Procedure:  SECTION REPEAT;  Surgeon: Sonya Barton DO;  Location: [facility] LABOR DELIVERY;  Service: Obstetrics/Gynecology;  Laterality: N/A;     SECTION  2010      OB History          6    Para   3    Term   2       1    AB   2    Living   3         SAB   2    IAB   0    Ectopic   0    Molar   0    Multiple   0    Live Births   3          Obstetric Comments   FOB #1 Pregnancy #1 SAB at 8 weeks  FOB #1 Pregnancy #2 P C/s at 40 weeks FTP, male  FOB #1 Pregnancy #3 R C/S at 40 weeks, female  FOB #1 Pregnancy #4 SAB at 6 weeks  FOB #1 Pregnancy #5 R C/S at 35 1/7 weeks, female  FOB #2 Pregnancy #6  current               Objective     Vital Signs  /92   Wt 81.6 kg (180 lb)   LMP 2023   Estimated body mass index is 31.89 kg/m² as calculated from the following:    Height as of 23: 160 cm (63\").    Weight as of this encounter: 81.6 kg (180 lb).    Physical Exam    Ultrasound Impression:   See " Viewpoint    Assessment and Plan     Kacy Woo is a 38 y.o.  20w5d who presents today for AMA    Diagnoses and all orders for this visit:    1. Antepartum multigravida of advanced maternal age (Primary)  Assessment & Plan:  The patient will be 38 years old at the time of delivery.  She was quoted the following age-related risks at term:  Risk for Down syndrome 1 in 225, risk for any chromosomal abnormality 1 in 125.  Options in genetic testing and genetic screening were discussed with the patient.  I noted an option of genetic testing in the 2nd trimester of transabdominal amniocentesis for the determination of fetal chromosomal complement as well as amniotic fluid alpha-fetoprotein for the evaluation of a fetal open neural tube defect.  A procedure-related fetal loss rate of 1 in 500 would be quoted with midtrimester amniocentesis.  I also discussed the option of analysis of cell-free fetal DNA in maternal blood.  I noted this directed analysis measures the relative proportion of chromosomes with the detection rate of fetal Down syndrome quoted as 99% with a false positive rate of less than .1%.  Screening for other fetal aneuploidies is also possible but the detection rate is lower with cell-free fetal DNA technology.  I then discussed the clinical utility of ultrasound and/or biochemical screening for the detection of fetal aneuploidy as well as fetal open neural tube defects.  Further, I have reviewed her self-reported family history and made suggestions as appropriate based on my review.      After careful consideration, patient elects for cfDNA screen.    Orders:  -     [id number] PLUS Core+SCA - Blood,  -     [facility]; Future    2. Elevated blood pressure affecting pregnancy, antepartum  -     [id number] PLUS Core+SCA - Blood,  -     [facility]; Future    3. Pregnancy, unspecified gestational age  -     [id number] PLUS Core+SCA - Blood,  -     [facility]; Future    4. Previous  delivery affecting pregnancy  -     [id number] PLUS Core+SCA - Blood,  -     [facility]; Future         Follow Up  Return in about 8 weeks (around 2023).    I spent 30 minutes caring for the patient on the day of service. This included: obtaining or reviewing a separately obtained medical history, reviewing patient records, performing a medically appropriate exam and/or evaluation, counseling or educating the patient/family/caregiver, ordering medications, labs, and/or procedures and documenting such in the medical record. This does not include time spent on review and interpretation of other tests such as fetal ultrasound or the performance of other procedures such as amniocentesis or CVS.      Douglas A. Milligan, MD  Maternal Fetal Medicine, [facility] [place]     10/18/2023

## 2023-10-18 NOTE — LETTER
2023     Carly Shearer DO  [address]  [address]  [place] [address]    Patient: Kacy Woo   YOB: 1985   Date of Visit: 10/18/2023     Dear Carly Shearer DO:       Thank you for referring Kacy Woo to me for evaluation. Below are the relevant portions of my assessment and plan of care.    If you have questions, please do not hesitate to call me. I look forward to following Kacy along with you.         Sincerely,        Douglas A. Milligan, MD        CC: No Recipients    Milligan, Douglas A, MD  10/18/23 1522  Sign when Signing Visit  Documentation of the ultrasound findings, images, and interpretations will be available in the patient's Viewpoint report which is located in the imaging tab in chart review.    Maternal/Fetal Medicine Consult Note     Name: Kacy Woo    : 1985     MRN: [id number]     Referring Provider: FLORENTIN Choi    Chief Complaint  No chief complaint on file.    Subjective     History of Present Illness:  Kacy Woo is a 38 y.o.  20w5d who presents today for ama    VIOLA: Estimated Date of Delivery: 3/1/24     ROS:   As noted in HPI.     Past Medical History:   Diagnosis Date   • Anemia    • Anxiety    • COVID-19    • Depression    • Hepatitis C    • Horseshoe kidney    • HPV (human papilloma virus) infection    • Hypertension    • Neuropathy    • PID (pelvic inflammatory disease)    • Scheuermann's disease       Past Surgical History:   Procedure Laterality Date   •  SECTION  2020   •  SECTION N/A 2020    Procedure:  SECTION REPEAT;  Surgeon: Sonya Barton DO;  Location:  [facility] LABOR DELIVERY;  Service: Obstetrics/Gynecology;  Laterality: N/A;   •  SECTION  2010      OB History          6    Para   3    Term   2       1    AB   2    Living   3         SAB   2    IAB   0    Ectopic   0    Molar   0    Multiple  "  0    Live Births   3          Obstetric Comments   FOB #1 Pregnancy #1 SAB at 8 weeks  FOB #1 Pregnancy #2 P C/s at 40 weeks FTP, male  FOB #1 Pregnancy #3 R C/S at 40 weeks, female  FOB #1 Pregnancy #4 SAB at 6 weeks  FOB #1 Pregnancy #5 R C/S at 35 1/7 weeks, female  FOB #2 Pregnancy #6  current               Objective     Vital Signs  /92   Wt 81.6 kg (180 lb)   LMP 2023   Estimated body mass index is 31.89 kg/m² as calculated from the following:    Height as of 23: 160 cm (63\").    Weight as of this encounter: 81.6 kg (180 lb).    Physical Exam    Ultrasound Impression:   See Viewpoint    Assessment and Plan     Kacy Woo is a 38 y.o.  20w5d who presents today for AMA    Diagnoses and all orders for this visit:    1. Antepartum multigravida of advanced maternal age (Primary)  Assessment & Plan:  The patient will be 38 years old at the time of delivery.  She was quoted the following age-related risks at term:  Risk for Down syndrome 1 in 225, risk for any chromosomal abnormality 1 in 125.  Options in genetic testing and genetic screening were discussed with the patient.  I noted an option of genetic testing in the 2nd trimester of transabdominal amniocentesis for the determination of fetal chromosomal complement as well as amniotic fluid alpha-fetoprotein for the evaluation of a fetal open neural tube defect.  A procedure-related fetal loss rate of 1 in 500 would be quoted with midtrimester amniocentesis.  I also discussed the option of analysis of cell-free fetal DNA in maternal blood.  I noted this directed analysis measures the relative proportion of chromosomes with the detection rate of fetal Down syndrome quoted as 99% with a false positive rate of less than .1%.  Screening for other fetal aneuploidies is also possible but the detection rate is lower with cell-free fetal DNA technology.  I then discussed the clinical utility of ultrasound and/or biochemical screening for the " detection of fetal aneuploidy as well as fetal open neural tube defects.  Further, I have reviewed her self-reported family history and made suggestions as appropriate based on my review.      After careful consideration, patient elects for cfDNA screen.    Orders:  -     [id number] PLUS Core+SCA - Blood,  -     [facility]; Future    2. Elevated blood pressure affecting pregnancy, antepartum  -     [id number] PLUS Core+SCA - Blood,  -     [facility]; Future    3. Pregnancy, unspecified gestational age  -     [id number] PLUS Core+SCA - Blood,  -     [facility]; Future    4. Previous  delivery affecting pregnancy  -     [id number] PLUS Core+SCA - Blood,  -     [facility]; Future         Follow Up  Return in about 8 weeks (around 2023).    I spent 30 minutes caring for the patient on the day of service. This included: obtaining or reviewing a separately obtained medical history, reviewing patient records, performing a medically appropriate exam and/or evaluation, counseling or educating the patient/family/caregiver, ordering medications, labs, and/or procedures and documenting such in the medical record. This does not include time spent on review and interpretation of other tests such as fetal ultrasound or the performance of other procedures such as amniocentesis or CVS.      Douglas A. Milligan, MD  Maternal Fetal Medicine, [facility] [place]     10/18/2023

## 2023-10-18 NOTE — PROGRESS NOTES
Patient denies bleeding, leaking fluid but does complain of some cramping when lifting her other child  NIPT declined  Patients next follow up with Dr. Shearer office is 11/13/2023

## 2023-10-26 ENCOUNTER — TELEPHONE (OUTPATIENT)
Dept: OBSTETRICS AND GYNECOLOGY | Facility: HOSPITAL | Age: 38
End: 2023-10-26
Payer: MEDICAID

## 2023-10-26 DIAGNOSIS — O28.5 ABNORMAL GENETIC TEST IN PREGNANCY: Primary | ICD-10-CM

## 2023-10-26 LAB — REF LAB TEST METHOD: NORMAL

## 2023-10-26 NOTE — TELEPHONE ENCOUNTER
Patient returned call.  She was informed of the test results for her cell free DNA screen.  She understands that this fetus has a approximately 85% chance of having trisomy 21.  We discussed options including pregnancy termination the patient plans to continue the pregnancy.  She does we discussed definitive testing with an amniocentesis and the patient declines amniocentesis at this time.  We discussed in general some of the challenges with Down syndrome people face and plan to give her the Down syndrome Society packet at her next visit.  She already has an appointment set up for 3 weeks and we will perform a fetal echocardiogram at that time.  Patient requested referral to genetic counselor for further counseling.  A referral was made to genetic counseling.

## 2023-12-05 ENCOUNTER — HOSPITAL ENCOUNTER (OUTPATIENT)
Facility: HOSPITAL | Age: 38
Discharge: OTHER INSTITUTION | End: 2023-12-06
Attending: OBSTETRICS & GYNECOLOGY | Admitting: OBSTETRICS & GYNECOLOGY
Payer: MEDICAID

## 2023-12-05 DIAGNOSIS — B02.8 HERPES ZOSTER WITH COMPLICATION: Primary | ICD-10-CM

## 2023-12-05 LAB
ALBUMIN SERPL-MCNC: 3.1 G/DL (ref 3.5–5.2)
ALBUMIN/GLOB SERPL: 0.9 G/DL
ALP SERPL-CCNC: 79 U/L (ref 39–117)
ALT SERPL W P-5'-P-CCNC: 25 U/L (ref 1–33)
AMPHET+METHAMPHET UR QL: NEGATIVE
AMPHETAMINES UR QL: POSITIVE
ANION GAP SERPL CALCULATED.3IONS-SCNC: 12.1 MMOL/L (ref 5–15)
AST SERPL-CCNC: 21 U/L (ref 1–32)
BARBITURATES UR QL SCN: NEGATIVE
BASOPHILS # BLD AUTO: 0.04 10*3/MM3 (ref 0–0.2)
BASOPHILS NFR BLD AUTO: 0.3 % (ref 0–1.5)
BENZODIAZ UR QL SCN: NEGATIVE
BILIRUB SERPL-MCNC: 0.2 MG/DL (ref 0–1.2)
BUN SERPL-MCNC: 10 MG/DL (ref 6–20)
BUN/CREAT SERPL: 14.9 (ref 7–25)
BUPRENORPHINE SERPL-MCNC: NEGATIVE NG/ML
CALCIUM SPEC-SCNC: 8.8 MG/DL (ref 8.6–10.5)
CANNABINOIDS SERPL QL: NEGATIVE
CHLORIDE SERPL-SCNC: 105 MMOL/L (ref 98–107)
CO2 SERPL-SCNC: 19.9 MMOL/L (ref 22–29)
COCAINE UR QL: NEGATIVE
CREAT SERPL-MCNC: 0.67 MG/DL (ref 0.57–1)
DEPRECATED RDW RBC AUTO: 42.9 FL (ref 37–54)
EGFRCR SERPLBLD CKD-EPI 2021: 115.6 ML/MIN/1.73
EOSINOPHIL # BLD AUTO: 0.31 10*3/MM3 (ref 0–0.4)
EOSINOPHIL NFR BLD AUTO: 2.2 % (ref 0.3–6.2)
ERYTHROCYTE [DISTWIDTH] IN BLOOD BY AUTOMATED COUNT: 13 % (ref 12.3–15.4)
FENTANYL UR-MCNC: NEGATIVE NG/ML
GLOBULIN UR ELPH-MCNC: 3.3 GM/DL
GLUCOSE SERPL-MCNC: 108 MG/DL (ref 65–99)
HCT VFR BLD AUTO: 32.7 % (ref 34–46.6)
HGB BLD-MCNC: 10.8 G/DL (ref 12–15.9)
IMM GRANULOCYTES # BLD AUTO: 0.09 10*3/MM3 (ref 0–0.05)
IMM GRANULOCYTES NFR BLD AUTO: 0.6 % (ref 0–0.5)
LDH SERPL-CCNC: 119 U/L (ref 135–214)
LYMPHOCYTES # BLD AUTO: 1.8 10*3/MM3 (ref 0.7–3.1)
LYMPHOCYTES NFR BLD AUTO: 12.9 % (ref 19.6–45.3)
MCH RBC QN AUTO: 30 PG (ref 26.6–33)
MCHC RBC AUTO-ENTMCNC: 33 G/DL (ref 31.5–35.7)
MCV RBC AUTO: 90.8 FL (ref 79–97)
METHADONE UR QL SCN: NEGATIVE
MONOCYTES # BLD AUTO: 1.05 10*3/MM3 (ref 0.1–0.9)
MONOCYTES NFR BLD AUTO: 7.5 % (ref 5–12)
NEUTROPHILS NFR BLD AUTO: 10.69 10*3/MM3 (ref 1.7–7)
NEUTROPHILS NFR BLD AUTO: 76.5 % (ref 42.7–76)
NRBC BLD AUTO-RTO: 0 /100 WBC (ref 0–0.2)
OPIATES UR QL: NEGATIVE
OXYCODONE UR QL SCN: NEGATIVE
PCP UR QL SCN: NEGATIVE
PLATELET # BLD AUTO: 310 10*3/MM3 (ref 140–450)
PMV BLD AUTO: 10.1 FL (ref 6–12)
POTASSIUM SERPL-SCNC: 3.9 MMOL/L (ref 3.5–5.2)
PROT SERPL-MCNC: 6.4 G/DL (ref 6–8.5)
RBC # BLD AUTO: 3.6 10*6/MM3 (ref 3.77–5.28)
SODIUM SERPL-SCNC: 137 MMOL/L (ref 136–145)
TRICYCLICS UR QL SCN: NEGATIVE
URATE SERPL-MCNC: 4.3 MG/DL (ref 2.4–5.7)
WBC NRBC COR # BLD AUTO: 13.98 10*3/MM3 (ref 3.4–10.8)

## 2023-12-05 PROCEDURE — 59025 FETAL NON-STRESS TEST: CPT

## 2023-12-05 PROCEDURE — 83615 LACTATE (LD) (LDH) ENZYME: CPT | Performed by: OBSTETRICS & GYNECOLOGY

## 2023-12-05 PROCEDURE — 36415 COLL VENOUS BLD VENIPUNCTURE: CPT | Performed by: OBSTETRICS & GYNECOLOGY

## 2023-12-05 PROCEDURE — 80053 COMPREHEN METABOLIC PANEL: CPT | Performed by: OBSTETRICS & GYNECOLOGY

## 2023-12-05 PROCEDURE — 80307 DRUG TEST PRSMV CHEM ANLYZR: CPT | Performed by: OBSTETRICS & GYNECOLOGY

## 2023-12-05 PROCEDURE — G0463 HOSPITAL OUTPT CLINIC VISIT: HCPCS

## 2023-12-05 PROCEDURE — 25010000002 LABETALOL 5 MG/ML SOLUTION: Performed by: OBSTETRICS & GYNECOLOGY

## 2023-12-05 PROCEDURE — 85025 COMPLETE CBC W/AUTO DIFF WBC: CPT | Performed by: OBSTETRICS & GYNECOLOGY

## 2023-12-05 PROCEDURE — 84156 ASSAY OF PROTEIN URINE: CPT | Performed by: OBSTETRICS & GYNECOLOGY

## 2023-12-05 PROCEDURE — 96374 THER/PROPH/DIAG INJ IV PUSH: CPT

## 2023-12-05 PROCEDURE — 84550 ASSAY OF BLOOD/URIC ACID: CPT | Performed by: OBSTETRICS & GYNECOLOGY

## 2023-12-05 PROCEDURE — 82570 ASSAY OF URINE CREATININE: CPT | Performed by: OBSTETRICS & GYNECOLOGY

## 2023-12-05 RX ORDER — NIFEDIPINE 30 MG/1
30 TABLET, EXTENDED RELEASE ORAL DAILY
Status: CANCELLED | OUTPATIENT
Start: 2023-12-06

## 2023-12-05 RX ORDER — SODIUM CHLORIDE 0.9 % (FLUSH) 0.9 %
10 SYRINGE (ML) INJECTION EVERY 12 HOURS SCHEDULED
Status: DISCONTINUED | OUTPATIENT
Start: 2023-12-05 | End: 2023-12-06 | Stop reason: HOSPADM

## 2023-12-05 RX ORDER — LABETALOL HYDROCHLORIDE 5 MG/ML
20-80 INJECTION, SOLUTION INTRAVENOUS
Status: DISCONTINUED | OUTPATIENT
Start: 2023-12-05 | End: 2023-12-06 | Stop reason: HOSPADM

## 2023-12-05 RX ORDER — ASPIRIN 81 MG/1
81 TABLET, CHEWABLE ORAL DAILY
COMMUNITY

## 2023-12-05 RX ORDER — LIDOCAINE HYDROCHLORIDE 10 MG/ML
0.5 INJECTION, SOLUTION INFILTRATION; PERINEURAL ONCE AS NEEDED
Status: DISCONTINUED | OUTPATIENT
Start: 2023-12-05 | End: 2023-12-06 | Stop reason: HOSPADM

## 2023-12-05 RX ORDER — LIDOCAINE 40 MG/G
1 CREAM TOPICAL AS NEEDED
Status: DISCONTINUED | OUTPATIENT
Start: 2023-12-05 | End: 2023-12-06 | Stop reason: HOSPADM

## 2023-12-05 RX ORDER — SODIUM CHLORIDE 0.9 % (FLUSH) 0.9 %
10 SYRINGE (ML) INJECTION AS NEEDED
Status: DISCONTINUED | OUTPATIENT
Start: 2023-12-05 | End: 2023-12-06 | Stop reason: HOSPADM

## 2023-12-05 RX ORDER — ACETAMINOPHEN 325 MG/1
650 TABLET ORAL EVERY 6 HOURS PRN
COMMUNITY

## 2023-12-05 RX ORDER — GABAPENTIN 100 MG/1
100 CAPSULE ORAL EVERY 8 HOURS SCHEDULED
Status: DISCONTINUED | OUTPATIENT
Start: 2023-12-05 | End: 2023-12-06 | Stop reason: HOSPADM

## 2023-12-05 RX ORDER — LABETALOL 300 MG/1
300 TABLET, FILM COATED ORAL 3 TIMES DAILY
COMMUNITY

## 2023-12-05 RX ORDER — SODIUM CHLORIDE 9 MG/ML
40 INJECTION, SOLUTION INTRAVENOUS AS NEEDED
Status: DISCONTINUED | OUTPATIENT
Start: 2023-12-05 | End: 2023-12-06 | Stop reason: HOSPADM

## 2023-12-05 RX ORDER — NIFEDIPINE 30 MG/1
30 TABLET, FILM COATED, EXTENDED RELEASE ORAL EVERY 24 HOURS
Status: DISCONTINUED | OUTPATIENT
Start: 2023-12-05 | End: 2023-12-06 | Stop reason: HOSPADM

## 2023-12-05 RX ORDER — VALACYCLOVIR HYDROCHLORIDE 1 G/1
1000 TABLET, FILM COATED ORAL 3 TIMES DAILY
Qty: 21 TABLET | Refills: 0 | Status: SHIPPED | OUTPATIENT
Start: 2023-12-05

## 2023-12-05 RX ORDER — AMOXICILLIN 875 MG/1
875 TABLET, COATED ORAL 2 TIMES DAILY
COMMUNITY
Start: 2023-11-29 | End: 2023-12-08

## 2023-12-05 RX ORDER — VALACYCLOVIR HYDROCHLORIDE 500 MG/1
1000 TABLET, FILM COATED ORAL EVERY 8 HOURS SCHEDULED
Status: DISCONTINUED | OUTPATIENT
Start: 2023-12-05 | End: 2023-12-06 | Stop reason: HOSPADM

## 2023-12-05 RX ORDER — HYDROXYZINE PAMOATE 25 MG/1
25 CAPSULE ORAL NIGHTLY PRN
COMMUNITY

## 2023-12-05 RX ORDER — DOXYLAMINE SUCCINATE AND PYRIDOXINE HYDROCHLORIDE, DELAYED RELEASE TABLETS 10 MG/10 MG 10; 10 MG/1; MG/1
2 TABLET, DELAYED RELEASE ORAL NIGHTLY PRN
COMMUNITY
End: 2023-12-06 | Stop reason: HOSPADM

## 2023-12-05 RX ORDER — HYDRALAZINE HYDROCHLORIDE 20 MG/ML
5-10 INJECTION INTRAMUSCULAR; INTRAVENOUS
Status: DISCONTINUED | OUTPATIENT
Start: 2023-12-05 | End: 2023-12-06 | Stop reason: HOSPADM

## 2023-12-05 RX ORDER — GABAPENTIN 100 MG/1
100 CAPSULE ORAL 3 TIMES DAILY
Qty: 9 CAPSULE | Refills: 0 | Status: SHIPPED | OUTPATIENT
Start: 2023-12-05

## 2023-12-05 RX ORDER — NIFEDIPINE 10 MG/1
10-20 CAPSULE ORAL
Status: DISCONTINUED | OUTPATIENT
Start: 2023-12-05 | End: 2023-12-06 | Stop reason: HOSPADM

## 2023-12-05 RX ADMIN — LABETALOL HYDROCHLORIDE 300 MG: 100 TABLET, FILM COATED ORAL at 19:18

## 2023-12-05 RX ADMIN — LABETALOL HYDROCHLORIDE 40 MG: 5 INJECTION, SOLUTION INTRAVENOUS at 20:16

## 2023-12-05 RX ADMIN — GABAPENTIN 100 MG: 100 CAPSULE ORAL at 21:10

## 2023-12-05 RX ADMIN — NIFEDIPINE 30 MG: 30 TABLET, EXTENDED RELEASE ORAL at 19:18

## 2023-12-05 RX ADMIN — LIDOCAINE 4% 1 APPLICATION: 4 CREAM TOPICAL at 22:50

## 2023-12-05 RX ADMIN — VALACYCLOVIR HYDROCHLORIDE 1000 MG: 500 TABLET, FILM COATED ORAL at 22:50

## 2023-12-05 RX ADMIN — LABETALOL HYDROCHLORIDE 20 MG: 5 INJECTION, SOLUTION INTRAVENOUS at 19:53

## 2023-12-06 VITALS
WEIGHT: 188.5 LBS | BODY MASS INDEX: 34.69 KG/M2 | DIASTOLIC BLOOD PRESSURE: 83 MMHG | OXYGEN SATURATION: 98 % | SYSTOLIC BLOOD PRESSURE: 140 MMHG | TEMPERATURE: 97.5 F | HEART RATE: 85 BPM | RESPIRATION RATE: 16 BRPM | HEIGHT: 62 IN

## 2023-12-06 PROBLEM — Z3A.27 27 WEEKS GESTATION OF PREGNANCY: Status: ACTIVE | Noted: 2023-12-06

## 2023-12-06 PROBLEM — O10.913 MATERNAL CHRONIC HYPERTENSION IN THIRD TRIMESTER: Status: ACTIVE | Noted: 2023-12-06

## 2023-12-06 PROBLEM — O14.13 SEVERE PRE-ECLAMPSIA IN THIRD TRIMESTER: Status: ACTIVE | Noted: 2023-12-06

## 2023-12-06 PROBLEM — B02.8 HERPES ZOSTER WITH COMPLICATION: Status: ACTIVE | Noted: 2023-12-06

## 2023-12-06 PROBLEM — O11.3: Status: ACTIVE | Noted: 2023-12-06

## 2023-12-06 LAB — MAGNESIUM SERPL-MCNC: 4.4 MG/DL (ref 1.6–2.6)

## 2023-12-06 PROCEDURE — 25010000002 MAGNESIUM SULFATE 20 GM/500ML SOLUTION: Performed by: OBSTETRICS & GYNECOLOGY

## 2023-12-06 PROCEDURE — 25010000002 HYDRALAZINE PER 20 MG: Performed by: OBSTETRICS & GYNECOLOGY

## 2023-12-06 PROCEDURE — 25010000002 LABETALOL 5 MG/ML SOLUTION: Performed by: OBSTETRICS & GYNECOLOGY

## 2023-12-06 PROCEDURE — 96376 TX/PRO/DX INJ SAME DRUG ADON: CPT

## 2023-12-06 PROCEDURE — 25010000002 ONDANSETRON PER 1 MG: Performed by: OBSTETRICS & GYNECOLOGY

## 2023-12-06 PROCEDURE — 25810000003 LACTATED RINGERS PER 1000 ML: Performed by: OBSTETRICS & GYNECOLOGY

## 2023-12-06 PROCEDURE — 83735 ASSAY OF MAGNESIUM: CPT | Performed by: OBSTETRICS & GYNECOLOGY

## 2023-12-06 PROCEDURE — 96375 TX/PRO/DX INJ NEW DRUG ADDON: CPT

## 2023-12-06 RX ORDER — ONDANSETRON 2 MG/ML
4 INJECTION INTRAMUSCULAR; INTRAVENOUS EVERY 6 HOURS PRN
Status: DISCONTINUED | OUTPATIENT
Start: 2023-12-06 | End: 2023-12-06 | Stop reason: HOSPADM

## 2023-12-06 RX ORDER — SODIUM CHLORIDE, SODIUM LACTATE, POTASSIUM CHLORIDE, CALCIUM CHLORIDE 600; 310; 30; 20 MG/100ML; MG/100ML; MG/100ML; MG/100ML
75 INJECTION, SOLUTION INTRAVENOUS CONTINUOUS
Status: DISCONTINUED | OUTPATIENT
Start: 2023-12-06 | End: 2023-12-06 | Stop reason: HOSPADM

## 2023-12-06 RX ORDER — DOXYLAMINE SUCCINATE AND PYRIDOXINE HYDROCHLORIDE, DELAYED RELEASE TABLETS 10 MG/10 MG 10; 10 MG/1; MG/1
2 TABLET, DELAYED RELEASE ORAL NIGHTLY PRN
Status: DISCONTINUED | OUTPATIENT
Start: 2023-12-06 | End: 2023-12-06 | Stop reason: HOSPADM

## 2023-12-06 RX ORDER — MAGNESIUM SULFATE HEPTAHYDRATE 40 MG/ML
2 INJECTION, SOLUTION INTRAVENOUS CONTINUOUS
Status: DISCONTINUED | OUTPATIENT
Start: 2023-12-06 | End: 2023-12-06 | Stop reason: HOSPADM

## 2023-12-06 RX ORDER — CALCIUM CARBONATE 500 MG/1
2 TABLET, CHEWABLE ORAL 3 TIMES DAILY PRN
Status: DISCONTINUED | OUTPATIENT
Start: 2023-12-06 | End: 2023-12-06

## 2023-12-06 RX ORDER — NITROFURANTOIN 25; 75 MG/1; MG/1
100 CAPSULE ORAL DAILY
Status: DISCONTINUED | OUTPATIENT
Start: 2023-12-06 | End: 2023-12-06 | Stop reason: HOSPADM

## 2023-12-06 RX ORDER — PRENATAL VIT/IRON FUM/FOLIC AC 27MG-0.8MG
1 TABLET ORAL DAILY
Status: DISCONTINUED | OUTPATIENT
Start: 2023-12-06 | End: 2023-12-06 | Stop reason: HOSPADM

## 2023-12-06 RX ORDER — HYDROXYZINE HYDROCHLORIDE 25 MG/1
25 TABLET, FILM COATED ORAL NIGHTLY
Status: DISCONTINUED | OUTPATIENT
Start: 2023-12-06 | End: 2023-12-06 | Stop reason: HOSPADM

## 2023-12-06 RX ORDER — FAMOTIDINE 20 MG/1
20 TABLET, FILM COATED ORAL 2 TIMES DAILY
Status: DISCONTINUED | OUTPATIENT
Start: 2023-12-06 | End: 2023-12-06 | Stop reason: HOSPADM

## 2023-12-06 RX ORDER — ASPIRIN 81 MG/1
81 TABLET, CHEWABLE ORAL DAILY
Status: DISCONTINUED | OUTPATIENT
Start: 2023-12-06 | End: 2023-12-06 | Stop reason: HOSPADM

## 2023-12-06 RX ORDER — ACETAMINOPHEN 325 MG/1
650 TABLET ORAL EVERY 6 HOURS PRN
Status: DISCONTINUED | OUTPATIENT
Start: 2023-12-06 | End: 2023-12-06 | Stop reason: HOSPADM

## 2023-12-06 RX ADMIN — LABETALOL HYDROCHLORIDE 300 MG: 100 TABLET, FILM COATED ORAL at 02:54

## 2023-12-06 RX ADMIN — HYDROXYZINE HYDROCHLORIDE 25 MG: 25 TABLET, FILM COATED ORAL at 02:55

## 2023-12-06 RX ADMIN — LABETALOL HYDROCHLORIDE 20 MG: 5 INJECTION, SOLUTION INTRAVENOUS at 14:20

## 2023-12-06 RX ADMIN — PRENATAL VIT W/ FE FUMARATE-FA TAB 27-0.8 MG 1 TABLET: 27-0.8 TAB at 09:23

## 2023-12-06 RX ADMIN — SODIUM CHLORIDE, POTASSIUM CHLORIDE, SODIUM LACTATE AND CALCIUM CHLORIDE 75 ML/HR: 600; 310; 30; 20 INJECTION, SOLUTION INTRAVENOUS at 17:30

## 2023-12-06 RX ADMIN — GABAPENTIN 100 MG: 100 CAPSULE ORAL at 05:10

## 2023-12-06 RX ADMIN — ACETAMINOPHEN 650 MG: 325 TABLET ORAL at 12:23

## 2023-12-06 RX ADMIN — LIDOCAINE 4% 1 APPLICATION: 4 CREAM TOPICAL at 13:13

## 2023-12-06 RX ADMIN — MAGNESIUM SULFATE HEPTAHYDRATE 2 G/HR: 40 INJECTION, SOLUTION INTRAVENOUS at 17:30

## 2023-12-06 RX ADMIN — VALACYCLOVIR HYDROCHLORIDE 1000 MG: 500 TABLET, FILM COATED ORAL at 05:10

## 2023-12-06 RX ADMIN — VALACYCLOVIR HYDROCHLORIDE 1000 MG: 500 TABLET, FILM COATED ORAL at 13:13

## 2023-12-06 RX ADMIN — NITROFURANTOIN MONOHYDRATE/MACROCRYSTALLINE 100 MG: 25; 75 CAPSULE ORAL at 09:26

## 2023-12-06 RX ADMIN — FAMOTIDINE 20 MG: 20 TABLET, FILM COATED ORAL at 09:26

## 2023-12-06 RX ADMIN — GABAPENTIN 100 MG: 100 CAPSULE ORAL at 13:14

## 2023-12-06 RX ADMIN — LABETALOL HYDROCHLORIDE 40 MG: 5 INJECTION, SOLUTION INTRAVENOUS at 15:08

## 2023-12-06 RX ADMIN — ACETAMINOPHEN 650 MG: 325 TABLET ORAL at 05:11

## 2023-12-06 RX ADMIN — ONDANSETRON 4 MG: 2 INJECTION INTRAMUSCULAR; INTRAVENOUS at 17:30

## 2023-12-06 RX ADMIN — ASPIRIN 81 MG: 81 TABLET, CHEWABLE ORAL at 09:22

## 2023-12-06 RX ADMIN — HYDRALAZINE HYDROCHLORIDE 5 MG: 20 INJECTION INTRAMUSCULAR; INTRAVENOUS at 16:32

## 2023-12-06 RX ADMIN — LABETALOL HYDROCHLORIDE 300 MG: 100 TABLET, FILM COATED ORAL at 10:17

## 2023-12-06 NOTE — DISCHARGE SUMMARY
Date of Discharge: 12/06/23     Discharge Diagnosis:       Problem List:    Severe pre-eclampsia in third trimester    Pregnancy    Maternal chronic hypertension in third trimester    27 weeks gestation of pregnancy    Pre-existing hypertension with pre-eclampsia, third trimester    Herpes zoster with complication  Maternity 21 test is positive for Trisomy 21 was done with [facility] specialist on 10/18/2023, she had declined amniocentesis    Presenting Problem/History of Present Illness  Pregnancy [Z34.90]      Hospital Course  Patient is a 38 y.o. female was admitted last night with pain, blistering rashes and swelling of the left upper extremity.   She has chronic hypertension managed with labetalol 300 mg every 8 hours and procardia XL. She has been treated with Valtrex 1000 mg every 8 hours and received IV antihypertensive medications 3 times the past 12 hours. She has been started on magnesium sulphate the 6 gm loading dose and 2 gm every 1 hour for neuro protection and preeclampsia prophylaxis. She has received one dose of Betamethasone this afternoon. I called the maternal and fetal medicine specialist at  and requested transfer for care because of severe range Bps. Dr Courtney Khan, accepted care of patient. I discussed the need for transfer with the patient and she asked questions. She has given informed consent for transfer to .   Procedures Performed         Consults:   Consults       No orders found for last 30 day(s).            Pertinent Test Results:    Condition on Discharge: Stable  Vital Signs  Temp:  [97.9 °F (36.6 °C)-98.3 °F (36.8 °C)] 97.9 °F (36.6 °C)  Heart Rate:  [72-91] 86  Resp:  [16-18] 16  BP: (0-195)/(0-114) 143/84    Physical Exam:     Constitutional:  Well-developed and well-nourished.  No respiratory distress.      HENT:  Head:  Normocephalic and atraumatic.  Mouth:  Moist mucous membranes.    Eyes:  Conjunctivae and EOM are normal.  No scleral icterus.    Neck:  Neck supple.  No JVD  present.    Cardiovascular:  Normal rate, regular rhythm and normal heart sounds with no murmur. No edema.  Pulmonary/Chest:  No respiratory distress, no wheezes, no crackles, with normal breath sounds and good air movement.  Abdominal:  Soft.  Bowel sounds are normal.  No distension and no tenderness.   Musculoskeletal:  No edema, no tenderness, and no deformity.  No red or swollen joints anywhere.    Neurological:  Alert and oriented to person, place, and time. Exacerbated sensation on the left shoulder and upper extremity.    Skin:  Skin is warm and dry. Blistering eruptions on the left hand and upper extremity.   Pelvic Exam: Discharge Disposition  Transfer to Another Facility    Discharge Medications     Discharge Medications        New Medications        Instructions Start Date   gabapentin 100 MG capsule  Commonly known as: NEURONTIN   100 mg, Oral, 3 Times Daily      valACYclovir 1000 MG tablet  Commonly known as: Valtrex   1,000 mg, Oral, 3 Times Daily             Continue These Medications        Instructions Start Date   acetaminophen 325 MG tablet  Commonly known as: TYLENOL   650 mg, Oral, Every 6 Hours PRN      amoxicillin 875 MG tablet  Commonly known as: AMOXIL   875 mg, Oral, 2 Times Daily      aspirin 81 MG chewable tablet   81 mg, Oral, Daily      famotidine 20 MG tablet  Commonly known as: PEPCID   20 mg, Oral, 2 Times Daily      hydrOXYzine pamoate 25 MG capsule  Commonly known as: VISTARIL   25 mg, Oral, Nightly PRN      labetalol 300 MG tablet  Commonly known as: NORMODYNE   300 mg, Oral, 3 times daily      NIFEdipine XL 30 MG 24 hr tablet  Commonly known as: PROCARDIA XL   30 mg, Oral, Daily      prenatal vitamin 27-0.8 27-0.8 MG tablet tablet   Oral, Daily             Stop These Medications      doxylamine-pyridoxine 10-10 MG tablet delayed-release EC tablet  Commonly known as: DICLEGIS     nitrofurantoin (macrocrystal-monohydrate) 100 MG capsule  Commonly known as: MACROBID            She  is transferred with magnesium sulphate infusion and a odom catheter.     Discharge Diet   IV fluid.    Activity at Discharge  Transfer to     Follow-up Appointments  Future Appointments   Date Time Provider Department Center   12/13/2023  9:00 AM Selma Bustillos  [facility]   12/13/2023  2:45 PM [facility]  [facility]   12/13/2023  2:45 PM  [facility] FOLLOW UP [facility] None           Time: Discharge 60 min

## 2023-12-06 NOTE — H&P
She has a rash on her left arm that appears to be herpes zoster.  She was seen in the [place] ER and they concurred with this diagnosis.  She is having significant pain with that.  I think this is contributing to her blood pressure elevation.  We are going to get her started on Valtrex and try some gabapentin as well as topical lidocaine.    She did not take her home medication for her blood pressure and so organ to get her started on that and we have given her some medication with the IV hypertensive protocol.  We repeat did labs and they are normal.  She does have chronic hypertension think this is likely due to her not taking her medication.  We will go ahead and do another 24-hour urine as well.

## 2023-12-06 NOTE — PROGRESS NOTES
PROGRESS NOTE         Patient Identification:  Name:  Kacy Woo  Age:  38 y.o.  Sex:  female  :  1985  MRN:  [id number]  Visit Number:  [id number]  Primary Care Provider:  Keily Galindo APRN      ----------------------------------------------------------------------------------------------------------------------  Subjective       Chief Complaints:    Elevated Blood Pressure (Patient states she has chronic HTN and missed her medicine at 1400 today, has had some irregular contractions, denies any lof, or vb and has +fm)        Interval History:    Patient was admitted last night with pain of the left shoulder and upper extremity.  She was diagnosed with shingles and is being managed with Valtrex 1000 mg every 8 hours.  She has chronic hypertension managed with oral labetalol 800 mg every 8 hours and Procardia XL 30 mg daily.  Her blood pressures have been in severe range today.  She complains of swelling and blistering eruptions with pain of the left hand and upper extremity.  Review of Systems:    Constitutional: no fever, chills and night sweats. No appetite change or unexpected weight change. No fatigue.  Eyes: no eye drainage, itching or redness.  HEENT: no mouth sores, dysphagia or nose bleed.  Respiratory: no for shortness of breath, cough or production of sputum.  Cardiovascular: no chest pain, no palpitations, no orthopnea.  Gastrointestinal: no nausea, vomiting or diarrhea. No abdominal pain, hematemesis or rectal bleeding.  Genitourinary: no dysuria or polyuria.  Hematologic/lymphatic: no lymph node abnormalities, no easy bruising or easy bleeding.  Musculoskeletal: no muscle or joint pain.  Skin: Blisters and pain left hand and upper extremity.  Neurological: Pain left upper extremity. Conscious, alert and orientated in TPP.  Behavioral/Psych: no depression or suicidal ideation.  Endocrine: no hot flashes.  Immunologic:  negative.  ----------------------------------------------------------------------------------------------------------------------      Objective       [facility] Meds:  aspirin, 81 mg, Oral, Daily  famotidine, 20 mg, Oral, BID  gabapentin, 100 mg, Oral, Q8H  hydrOXYzine, 25 mg, Oral, Nightly  labetalol, 300 mg, Oral, Q8H  NIFEdipine CC, 30 mg, Oral, Q24H  nitrofurantoin (macrocrystal-monohydrate), 100 mg, Oral, Daily  prenatal vitamin 27-0.8, 1 tablet, Oral, Daily  sodium chloride, 10 mL, Intravenous, Q12H  sodium chloride, 10 mL, Intravenous, Q12H  valACYclovir, 1,000 mg, Oral, Q8H      magnesium sulfate, 2 g/hr      ----------------------------------------------------------------------------------------------------------------------    Vital Signs:  Temp:  [97.9 °F (36.6 °C)-98.3 °F (36.8 °C)] 97.9 °F (36.6 °C)  Heart Rate:  [72-91] 83  Resp:  [18] 18  BP: (0-195)/(0-114) 146/87  No data found.  SpO2 Percentage    12/06/23 1635 12/06/23 1655 12/06/23 1700   SpO2: 92% 93% 90%     SpO2:  [90 %-98 %] 90 %  on   ;   Device (Oxygen Therapy): room air    Body mass index is 34.48 kg/m².  Wt Readings from Last 3 Encounters:   12/05/23 85.5 kg (188 lb 8 oz)   10/18/23 81.6 kg (180 lb)   09/25/23 76.8 kg (169 lb 4.8 oz)      No intake or output data in the 24 hours ending 12/06/23 2151  Diet: Regular/House Diet; Fluid Consistency: Thin (IDDSI 0)  ----------------------------------------------------------------------------------------------------------------------    Physical exam:    Constitutional:  Well-developed and well-nourished.  No respiratory distress.      HENT:  Head:  Normocephalic and atraumatic.  Mouth:  Moist mucous membranes.    Eyes:  Conjunctivae and EOM are normal.  No scleral icterus.    Neck:  Neck supple.  No JVD present.    Cardiovascular:  Normal rate, regular rhythm and normal heart sounds with no murmur. No edema.  Pulmonary/Chest:  No respiratory distress, no wheezes, no crackles, with normal  "breath sounds and good air movement.  Abdominal:  Soft.  Bowel sounds are normal.  No distension and no tenderness.   Musculoskeletal:  No edema, no tenderness, and no deformity.  No red or swollen joints anywhere.    Neurological:  Alert and oriented to person, place, and time. No facial droop.  No slurred speech.   Skin:  blisters, rash and swelling of the left hand forearm and arm.   Pelvic Exam: deferred.   ----------------------------------------------------------------------------------------------------------------------  I have personally reviewed the EKG/Telemetry strips   ----------------------------------------------------------------------------------------------------------------------            Results from last 7 days   Lab Units 12/05/23 1913   WBC 10*3/mm3 13.98*   HEMOGLOBIN g/dL 10.8*   HEMATOCRIT % 32.7*   MCV fL 90.8   MCHC g/dL 33.0   PLATELETS 10*3/mm3 310     Results from last 7 days   Lab Units 12/05/23 1913   SODIUM mmol/L 137   POTASSIUM mmol/L 3.9   CHLORIDE mmol/L 105   CO2 mmol/L 19.9*   BUN mg/dL 10   CREATININE mg/dL 0.67   CALCIUM mg/dL 8.8   GLUCOSE mg/dL 108*   ALBUMIN g/dL 3.1*   BILIRUBIN mg/dL 0.2   ALK PHOS U/L 79   AST (SGOT) U/L 21   ALT (SGPT) U/L 25   Estimated Creatinine Clearance: 116.7 mL/min (by C-G formula based on SCr of 0.67 mg/dL).  No results found for: \"AMMONIA\"    No results found for: \"HGBA1C\", \"POCGLU\"  Lab Results   Component Value Date    HGBA1C 5.10 05/11/2022     Lab Results   Component Value Date    TSH 0.485 06/05/2021       No results found for: \"BLOODCX\"  No results found for: \"URINECX\"  No results found for: \"WOUNDCX\"  No results found for: \"STOOLCX\"  No results found for: \"RESPCX\"  Pain Management Panel  More data exists         Latest Ref Rng & Units 12/5/2023 9/23/2023   Pain Management Panel   Amphetamine, Urine Qual Negative Negative  Negative    Barbiturates Screen, Urine Negative Negative  Negative    Benzodiazepine Screen, Urine Negative " Negative  Negative    Buprenorphine, Screen, Urine Negative Negative  Negative    Cocaine Screen, Urine Negative Negative  Negative    Fentanyl, Urine Negative Negative  Negative    Methadone Screen , Urine Negative Negative  Negative    Methamphetamine, Ur Negative Positive  Negative        I have personally reviewed the above laboratory results.   ----------------------------------------------------------------------------------------------------------------------  Imaging Results (Last 24 Hours)       ** No results found for the last 24 hours. **          I have personally reviewed the above radiology results.   ----------------------------------------------------------------------------------------------------------------------    Assessment/Plan       [unfilled]    ASSESSMENT:    Chronic hypertension   Severe range BP  Shingles, Herpes Zoster infection, outbreak, left upper extremity.     PLAN:    Transfer to  for severe range blood pressure, with chronic hypertension possible superimposed preeclampsia.  I called the transfer line and spoke with Dr. Courtney Green, and she accepted the transfer.  Patient has been counseled appropriately and understands the risk associated with transfer and has agreed with the plan of care.  She has been started on IV magnesium sulfate and received 1 dose of betamethasone.  Code Status:   Code Status and Medical Interventions:   Ordered at: 12/05/23 1944     Level Of Support Discussed With:    Patient     Code Status (Patient has no pulse and is not breathing):    CPR (Attempt to Resuscitate)     Medical Interventions (Patient has pulse or is breathing):    Full Support       Rodri Mason MD  12/06/23  17:29 EST

## 2023-12-06 NOTE — NON STRESS TEST
Kacy Woo, a  at 27w5d with an VIOLA of 3/1/2024, by Ultrasound, was seen at [facility] LABOR DELIVERY for a nonstress test.    Chief Complaint   Patient presents with    Elevated Blood Pressure     Patient states she has chronic HTN and missed her medicine at 1400 today, has had some irregular contractions, denies any lof, or vb and has +fm       Patient Active Problem List   Diagnosis    Pyelonephritis    Clinical diagnosis of severe acute respiratory syndrome coronavirus 2 (SARS-CoV-2) disease    Elevated blood pressure affecting pregnancy, antepartum    Hypertension affecting pregnancy in second trimester    Antepartum multigravida of advanced maternal age    Previous  delivery affecting pregnancy    Pregnancy       Start Time:   Stop Time:     10X10 ACCELERATIONS    Interpretation A  Nonstress Test Interpretation A: Reactive  Comments A: VERIFIED BY QUAN MARIO RN

## 2023-12-06 NOTE — PHARMACY PATIENT ASSISTANCE
Pharmacy checked on coverage for nifedipine 30mg daily.  According to her insurance, she will have a $0 copay.    Thank You;  Karol Braun, PharmD  12/06/23  16:14 EST

## 2023-12-06 NOTE — PLAN OF CARE
Goal Outcome Evaluation:              Outcome Evaluation: BP STABLE. SHINGLES PAIN BEING MANAGED WITH MEDS. FETAL TRACING REACTIVE. POC EXPLAINED. PT VERBALIZED UNDERSTANDING.

## 2023-12-07 LAB
CREAT UR-MCNC: 82.3 MG/DL
PROT ?TM UR-MCNC: 18.5 MG/DL
PROT/CREAT UR: 224.8 MG/G CREA (ref 0–200)

## 2023-12-08 ENCOUNTER — TELEPHONE (OUTPATIENT)
Dept: GENETICS | Facility: HOSPITAL | Age: 38
End: 2023-12-08
Payer: MEDICAID

## 2023-12-08 NOTE — TELEPHONE ENCOUNTER
Called pt to remind her of in person genetic counseling appt scheduled for Wednesday 12/13. Pt confirmed appt. She stated she was currently admitted to  and was planning on doing amnio later today. Offered to reschedule pt if she wanted in light of her being admitted for the last few days. Pt declined rescheduling and stated she would be discharged prior to appt. Informed pt to call if she needed to r/s and we would work her in around her schedule. Pt was agreeable.

## 2024-01-19 ENCOUNTER — APPOINTMENT (OUTPATIENT)
Dept: ULTRASOUND IMAGING | Facility: HOSPITAL | Age: 39
End: 2024-01-19
Payer: MEDICAID

## 2024-01-19 ENCOUNTER — HOSPITAL ENCOUNTER (OUTPATIENT)
Facility: HOSPITAL | Age: 39
Discharge: HOME | End: 2024-01-20
Attending: OBSTETRICS & GYNECOLOGY | Admitting: OBSTETRICS & GYNECOLOGY
Payer: MEDICAID

## 2024-01-19 LAB
ALBUMIN SERPL-MCNC: 3.3 G/DL (ref 3.5–5.2)
ALBUMIN/GLOB SERPL: 0.8 G/DL
ALP SERPL-CCNC: 121 U/L (ref 39–117)
ALT SERPL W P-5'-P-CCNC: 12 U/L (ref 1–33)
ANION GAP SERPL CALCULATED.3IONS-SCNC: 12.5 MMOL/L (ref 5–15)
AST SERPL-CCNC: 18 U/L (ref 1–32)
BILIRUB SERPL-MCNC: <0.2 MG/DL (ref 0–1.2)
BILIRUB UR QL STRIP: NEGATIVE
BUN SERPL-MCNC: 18 MG/DL (ref 6–20)
BUN/CREAT SERPL: 19.8 (ref 7–25)
CALCIUM SPEC-SCNC: 9.8 MG/DL (ref 8.6–10.5)
CHLORIDE SERPL-SCNC: 102 MMOL/L (ref 98–107)
CLARITY UR: CLEAR
CO2 SERPL-SCNC: 19.5 MMOL/L (ref 22–29)
COLOR UR: YELLOW
CREAT SERPL-MCNC: 0.91 MG/DL (ref 0.57–1)
DEPRECATED RDW RBC AUTO: 43.3 FL (ref 37–54)
EGFRCR SERPLBLD CKD-EPI 2021: 83.5 ML/MIN/1.73
ERYTHROCYTE [DISTWIDTH] IN BLOOD BY AUTOMATED COUNT: 13.2 % (ref 12.3–15.4)
FIBRINOGEN PPP-MCNC: 542 MG/DL (ref 173–524)
GLOBULIN UR ELPH-MCNC: 3.9 GM/DL
GLUCOSE SERPL-MCNC: 124 MG/DL (ref 65–99)
GLUCOSE UR STRIP-MCNC: NEGATIVE MG/DL
HCT VFR BLD AUTO: 37.5 % (ref 34–46.6)
HGB BLD-MCNC: 12.7 G/DL (ref 12–15.9)
HGB UR QL STRIP.AUTO: NEGATIVE
KETONES UR QL STRIP: NEGATIVE
LDH SERPL-CCNC: 132 U/L (ref 135–214)
LEUKOCYTE ESTERASE UR QL STRIP.AUTO: NEGATIVE
MCH RBC QN AUTO: 30.4 PG (ref 26.6–33)
MCHC RBC AUTO-ENTMCNC: 33.9 G/DL (ref 31.5–35.7)
MCV RBC AUTO: 89.7 FL (ref 79–97)
NITRITE UR QL STRIP: NEGATIVE
PH UR STRIP.AUTO: 7 [PH] (ref 5–8)
PLATELET # BLD AUTO: 320 10*3/MM3 (ref 140–450)
PMV BLD AUTO: 10.6 FL (ref 6–12)
POTASSIUM SERPL-SCNC: 4.2 MMOL/L (ref 3.5–5.2)
PROT SERPL-MCNC: 7.2 G/DL (ref 6–8.5)
PROT UR QL STRIP: NEGATIVE
RBC # BLD AUTO: 4.18 10*6/MM3 (ref 3.77–5.28)
SODIUM SERPL-SCNC: 134 MMOL/L (ref 136–145)
SP GR UR STRIP: 1.01 (ref 1–1.03)
URATE SERPL-MCNC: 4.4 MG/DL (ref 2.4–5.7)
UROBILINOGEN UR QL STRIP: NORMAL
WBC NRBC COR # BLD AUTO: 21.89 10*3/MM3 (ref 3.4–10.8)

## 2024-01-19 PROCEDURE — 83615 LACTATE (LD) (LDH) ENZYME: CPT | Performed by: OBSTETRICS & GYNECOLOGY

## 2024-01-19 PROCEDURE — 25810000003 LACTATED RINGERS PER 1000 ML: Performed by: OBSTETRICS & GYNECOLOGY

## 2024-01-19 PROCEDURE — 87086 URINE CULTURE/COLONY COUNT: CPT | Performed by: OBSTETRICS & GYNECOLOGY

## 2024-01-19 PROCEDURE — 76819 FETAL BIOPHYS PROFIL W/O NST: CPT

## 2024-01-19 PROCEDURE — 85384 FIBRINOGEN ACTIVITY: CPT | Performed by: OBSTETRICS & GYNECOLOGY

## 2024-01-19 PROCEDURE — 81003 URINALYSIS AUTO W/O SCOPE: CPT | Performed by: OBSTETRICS & GYNECOLOGY

## 2024-01-19 PROCEDURE — G0463 HOSPITAL OUTPT CLINIC VISIT: HCPCS

## 2024-01-19 PROCEDURE — P9612 CATHETERIZE FOR URINE SPEC: HCPCS

## 2024-01-19 PROCEDURE — 85027 COMPLETE CBC AUTOMATED: CPT | Performed by: OBSTETRICS & GYNECOLOGY

## 2024-01-19 PROCEDURE — 84550 ASSAY OF BLOOD/URIC ACID: CPT | Performed by: OBSTETRICS & GYNECOLOGY

## 2024-01-19 PROCEDURE — 36415 COLL VENOUS BLD VENIPUNCTURE: CPT | Performed by: OBSTETRICS & GYNECOLOGY

## 2024-01-19 PROCEDURE — 84156 ASSAY OF PROTEIN URINE: CPT | Performed by: OBSTETRICS & GYNECOLOGY

## 2024-01-19 PROCEDURE — 80053 COMPREHEN METABOLIC PANEL: CPT | Performed by: OBSTETRICS & GYNECOLOGY

## 2024-01-19 PROCEDURE — 82570 ASSAY OF URINE CREATININE: CPT | Performed by: OBSTETRICS & GYNECOLOGY

## 2024-01-19 PROCEDURE — 76819 FETAL BIOPHYS PROFIL W/O NST: CPT | Performed by: RADIOLOGY

## 2024-01-19 RX ORDER — SODIUM CHLORIDE 0.9 % (FLUSH) 0.9 %
10 SYRINGE (ML) INJECTION EVERY 12 HOURS SCHEDULED
Status: DISCONTINUED | OUTPATIENT
Start: 2024-01-19 | End: 2024-01-20 | Stop reason: HOSPADM

## 2024-01-19 RX ORDER — SODIUM CHLORIDE 0.9 % (FLUSH) 0.9 %
10 SYRINGE (ML) INJECTION AS NEEDED
Status: DISCONTINUED | OUTPATIENT
Start: 2024-01-19 | End: 2024-01-20 | Stop reason: HOSPADM

## 2024-01-19 RX ORDER — NIFEDIPINE 30 MG
30 TABLET, EXTENDED RELEASE ORAL ONCE
Status: COMPLETED | OUTPATIENT
Start: 2024-01-19 | End: 2024-01-19

## 2024-01-19 RX ORDER — NIFEDIPINE 30 MG
60 TABLET, EXTENDED RELEASE ORAL ONCE
Status: DISCONTINUED | OUTPATIENT
Start: 2024-01-19 | End: 2024-01-19

## 2024-01-19 RX ORDER — SODIUM CHLORIDE, SODIUM LACTATE, POTASSIUM CHLORIDE, CALCIUM CHLORIDE 600; 310; 30; 20 MG/100ML; MG/100ML; MG/100ML; MG/100ML
125 INJECTION, SOLUTION INTRAVENOUS CONTINUOUS
Status: DISCONTINUED | OUTPATIENT
Start: 2024-01-19 | End: 2024-01-20 | Stop reason: HOSPADM

## 2024-01-19 RX ADMIN — SODIUM CHLORIDE, POTASSIUM CHLORIDE, SODIUM LACTATE AND CALCIUM CHLORIDE 125 ML/HR: 600; 310; 30; 20 INJECTION, SOLUTION INTRAVENOUS at 21:39

## 2024-01-19 RX ADMIN — LABETALOL HYDROCHLORIDE 300 MG: 200 TABLET, FILM COATED ORAL at 22:34

## 2024-01-19 RX ADMIN — NIFEDIPINE 30 MG: 30 TABLET, EXTENDED RELEASE ORAL at 22:34

## 2024-01-20 VITALS — SYSTOLIC BLOOD PRESSURE: 147 MMHG | HEART RATE: 88 BPM | DIASTOLIC BLOOD PRESSURE: 92 MMHG

## 2024-01-20 LAB
CREAT UR-MCNC: 33.5 MG/DL
PROT ?TM UR-MCNC: 6.2 MG/DL
PROT/CREAT UR: 185.1 MG/G CREA (ref 0–200)

## 2024-01-20 PROCEDURE — G0378 HOSPITAL OBSERVATION PER HR: HCPCS

## 2024-01-20 RX ORDER — LABETALOL 200 MG/1
400 TABLET, FILM COATED ORAL 2 TIMES DAILY
COMMUNITY

## 2024-01-20 RX ORDER — HYDROXYZINE HYDROCHLORIDE 25 MG/1
50 TABLET, FILM COATED ORAL NIGHTLY PRN
Status: DISCONTINUED | OUTPATIENT
Start: 2024-01-20 | End: 2024-01-20 | Stop reason: HOSPADM

## 2024-01-20 RX ORDER — NIFEDIPINE 60 MG/1
60 TABLET, EXTENDED RELEASE ORAL DAILY
COMMUNITY

## 2024-01-20 NOTE — NURSING NOTE
DISCHARGE INSTRUCTIONS, S/S OF LABOR, AND EMERGENCY WARNINGS REVIEWED WITH PT. ADVS. PT. TO BE SURE TO TAKE MEDICATIONS AS PRESCRIBED AND TO KEEP ALL FOLLOW-UP APPOINTMENTS, UNDERSTANDING VERBALIZED.

## 2024-01-21 LAB — BACTERIA SPEC AEROBE CULT: NO GROWTH

## 2024-02-01 ENCOUNTER — APPOINTMENT (OUTPATIENT)
Dept: ULTRASOUND IMAGING | Facility: HOSPITAL | Age: 39
End: 2024-02-01
Payer: MEDICAID

## 2024-02-01 ENCOUNTER — HOSPITAL ENCOUNTER (INPATIENT)
Facility: HOSPITAL | Age: 39
LOS: 1 days | Discharge: OTHER INSTITUTION | End: 2024-02-02
Attending: OBSTETRICS & GYNECOLOGY | Admitting: OBSTETRICS & GYNECOLOGY
Payer: MEDICAID

## 2024-02-01 LAB
A1 MICROGLOB PLACENTAL VAG QL: NEGATIVE
ALBUMIN SERPL-MCNC: 3.2 G/DL (ref 3.5–5.2)
ALBUMIN/GLOB SERPL: 0.8 G/DL
ALP SERPL-CCNC: 125 U/L (ref 39–117)
ALT SERPL W P-5'-P-CCNC: 21 U/L (ref 1–33)
AMPHET+METHAMPHET UR QL: NEGATIVE
AMPHETAMINES UR QL: NEGATIVE
ANION GAP SERPL CALCULATED.3IONS-SCNC: 12 MMOL/L (ref 5–15)
AST SERPL-CCNC: 18 U/L (ref 1–32)
BARBITURATES UR QL SCN: NEGATIVE
BENZODIAZ UR QL SCN: NEGATIVE
BILIRUB SERPL-MCNC: <0.2 MG/DL (ref 0–1.2)
BILIRUB UR QL STRIP: NEGATIVE
BUN SERPL-MCNC: 12 MG/DL (ref 6–20)
BUN/CREAT SERPL: 16 (ref 7–25)
BUPRENORPHINE SERPL-MCNC: NEGATIVE NG/ML
CALCIUM SPEC-SCNC: 9.5 MG/DL (ref 8.6–10.5)
CANNABINOIDS SERPL QL: NEGATIVE
CHLORIDE SERPL-SCNC: 102 MMOL/L (ref 98–107)
CLARITY UR: CLEAR
CO2 SERPL-SCNC: 19 MMOL/L (ref 22–29)
COCAINE UR QL: NEGATIVE
COLOR UR: YELLOW
CREAT SERPL-MCNC: 0.75 MG/DL (ref 0.57–1)
DEPRECATED RDW RBC AUTO: 45.2 FL (ref 37–54)
EGFRCR SERPLBLD CKD-EPI 2021: 105.3 ML/MIN/1.73
ERYTHROCYTE [DISTWIDTH] IN BLOOD BY AUTOMATED COUNT: 13.4 % (ref 12.3–15.4)
FENTANYL UR-MCNC: NEGATIVE NG/ML
GLOBULIN UR ELPH-MCNC: 3.9 GM/DL
GLUCOSE SERPL-MCNC: 86 MG/DL (ref 65–99)
GLUCOSE UR STRIP-MCNC: NEGATIVE MG/DL
HCT VFR BLD AUTO: 36.8 % (ref 34–46.6)
HGB BLD-MCNC: 11.9 G/DL (ref 12–15.9)
HGB UR QL STRIP.AUTO: NEGATIVE
KETONES UR QL STRIP: NEGATIVE
LDH SERPL-CCNC: 142 U/L (ref 135–214)
LEUKOCYTE ESTERASE UR QL STRIP.AUTO: NEGATIVE
MCH RBC QN AUTO: 29.8 PG (ref 26.6–33)
MCHC RBC AUTO-ENTMCNC: 32.3 G/DL (ref 31.5–35.7)
MCV RBC AUTO: 92.2 FL (ref 79–97)
METHADONE UR QL SCN: NEGATIVE
NITRITE UR QL STRIP: NEGATIVE
OPIATES UR QL: NEGATIVE
OXYCODONE UR QL SCN: NEGATIVE
PCP UR QL SCN: NEGATIVE
PH UR STRIP.AUTO: 7.5 [PH] (ref 5–8)
PLATELET # BLD AUTO: 353 10*3/MM3 (ref 140–450)
PMV BLD AUTO: 11 FL (ref 6–12)
POTASSIUM SERPL-SCNC: 4 MMOL/L (ref 3.5–5.2)
PROT SERPL-MCNC: 7.1 G/DL (ref 6–8.5)
PROT UR QL STRIP: NEGATIVE
RBC # BLD AUTO: 3.99 10*6/MM3 (ref 3.77–5.28)
SODIUM SERPL-SCNC: 133 MMOL/L (ref 136–145)
SP GR UR STRIP: 1.01 (ref 1–1.03)
TRICYCLICS UR QL SCN: NEGATIVE
URATE SERPL-MCNC: 4.4 MG/DL (ref 2.4–5.7)
UROBILINOGEN UR QL STRIP: NORMAL
WBC NRBC COR # BLD AUTO: 17.47 10*3/MM3 (ref 3.4–10.8)

## 2024-02-01 PROCEDURE — 81003 URINALYSIS AUTO W/O SCOPE: CPT | Performed by: OBSTETRICS & GYNECOLOGY

## 2024-02-01 PROCEDURE — 87086 URINE CULTURE/COLONY COUNT: CPT | Performed by: OBSTETRICS & GYNECOLOGY

## 2024-02-01 PROCEDURE — G0463 HOSPITAL OUTPT CLINIC VISIT: HCPCS

## 2024-02-01 PROCEDURE — 84550 ASSAY OF BLOOD/URIC ACID: CPT | Performed by: OBSTETRICS & GYNECOLOGY

## 2024-02-01 PROCEDURE — 80307 DRUG TEST PRSMV CHEM ANLYZR: CPT | Performed by: OBSTETRICS & GYNECOLOGY

## 2024-02-01 PROCEDURE — 84112 EVAL AMNIOTIC FLUID PROTEIN: CPT | Performed by: OBSTETRICS & GYNECOLOGY

## 2024-02-01 PROCEDURE — 25010000002 BETAMETHASONE ACET & SOD PHOS PER 4 MG: Performed by: OBSTETRICS & GYNECOLOGY

## 2024-02-01 PROCEDURE — 80053 COMPREHEN METABOLIC PANEL: CPT | Performed by: OBSTETRICS & GYNECOLOGY

## 2024-02-01 PROCEDURE — 76819 FETAL BIOPHYS PROFIL W/O NST: CPT

## 2024-02-01 PROCEDURE — 36415 COLL VENOUS BLD VENIPUNCTURE: CPT | Performed by: OBSTETRICS & GYNECOLOGY

## 2024-02-01 PROCEDURE — 76819 FETAL BIOPHYS PROFIL W/O NST: CPT | Performed by: RADIOLOGY

## 2024-02-01 PROCEDURE — 85027 COMPLETE CBC AUTOMATED: CPT | Performed by: OBSTETRICS & GYNECOLOGY

## 2024-02-01 PROCEDURE — 83615 LACTATE (LD) (LDH) ENZYME: CPT | Performed by: OBSTETRICS & GYNECOLOGY

## 2024-02-01 RX ORDER — DOXYLAMINE SUCCINATE AND PYRIDOXINE HYDROCHLORIDE, DELAYED RELEASE TABLETS 10 MG/10 MG 10; 10 MG/1; MG/1
2 TABLET, DELAYED RELEASE ORAL NIGHTLY PRN
COMMUNITY
End: 2024-02-02 | Stop reason: HOSPADM

## 2024-02-01 RX ORDER — SODIUM CHLORIDE, SODIUM LACTATE, POTASSIUM CHLORIDE, CALCIUM CHLORIDE 600; 310; 30; 20 MG/100ML; MG/100ML; MG/100ML; MG/100ML
125 INJECTION, SOLUTION INTRAVENOUS CONTINUOUS
Status: DISCONTINUED | OUTPATIENT
Start: 2024-02-01 | End: 2024-02-02 | Stop reason: HOSPADM

## 2024-02-01 RX ORDER — HYDRALAZINE HYDROCHLORIDE 20 MG/ML
5-10 INJECTION INTRAMUSCULAR; INTRAVENOUS
Status: DISCONTINUED | OUTPATIENT
Start: 2024-02-01 | End: 2024-02-02 | Stop reason: HOSPADM

## 2024-02-01 RX ORDER — BETAMETHASONE SODIUM PHOSPHATE AND BETAMETHASONE ACETATE 3; 3 MG/ML; MG/ML
12 INJECTION, SUSPENSION INTRA-ARTICULAR; INTRALESIONAL; INTRAMUSCULAR; SOFT TISSUE ONCE
Status: COMPLETED | OUTPATIENT
Start: 2024-02-01 | End: 2024-02-01

## 2024-02-01 RX ORDER — NIFEDIPINE 10 MG/1
10-20 CAPSULE ORAL
Status: DISCONTINUED | OUTPATIENT
Start: 2024-02-01 | End: 2024-02-02 | Stop reason: HOSPADM

## 2024-02-01 RX ORDER — NIFEDIPINE 30 MG
30 TABLET, EXTENDED RELEASE ORAL 2 TIMES DAILY
Status: DISCONTINUED | OUTPATIENT
Start: 2024-02-01 | End: 2024-02-02 | Stop reason: HOSPADM

## 2024-02-01 RX ORDER — NALOXONE HYDROCHLORIDE 4 MG/.1ML
1 SPRAY NASAL AS NEEDED
Status: ON HOLD | COMMUNITY
End: 2024-02-02

## 2024-02-01 RX ORDER — LABETALOL HYDROCHLORIDE 5 MG/ML
20-80 INJECTION, SOLUTION INTRAVENOUS
Status: DISCONTINUED | OUTPATIENT
Start: 2024-02-01 | End: 2024-02-02 | Stop reason: HOSPADM

## 2024-02-01 RX ORDER — LIDOCAINE 40 MG/G
1 CREAM TOPICAL AS NEEDED
COMMUNITY
End: 2024-02-02 | Stop reason: HOSPADM

## 2024-02-01 RX ORDER — METRONIDAZOLE 7.5 MG/G
1 GEL VAGINAL 2 TIMES DAILY
COMMUNITY
End: 2024-02-02 | Stop reason: HOSPADM

## 2024-02-01 RX ADMIN — NIFEDIPINE 30 MG: 30 TABLET, EXTENDED RELEASE ORAL at 21:49

## 2024-02-01 RX ADMIN — BETAMETHASONE SODIUM PHOSPHATE AND BETAMETHASONE ACETATE 12 MG: 3; 3 INJECTION, SUSPENSION INTRA-ARTICULAR; INTRALESIONAL; INTRAMUSCULAR at 21:49

## 2024-02-01 RX ADMIN — LABETALOL HYDROCHLORIDE 300 MG: 100 TABLET, FILM COATED ORAL at 20:41

## 2024-02-02 ENCOUNTER — APPOINTMENT (OUTPATIENT)
Dept: ULTRASOUND IMAGING | Facility: HOSPITAL | Age: 39
End: 2024-02-02
Payer: MEDICAID

## 2024-02-02 VITALS
TEMPERATURE: 98 F | HEIGHT: 63 IN | DIASTOLIC BLOOD PRESSURE: 81 MMHG | WEIGHT: 193.8 LBS | BODY MASS INDEX: 34.34 KG/M2 | HEART RATE: 90 BPM | SYSTOLIC BLOOD PRESSURE: 127 MMHG | RESPIRATION RATE: 16 BRPM

## 2024-02-02 PROBLEM — O14.13 SEVERE PRE-ECLAMPSIA IN THIRD TRIMESTER: Status: RESOLVED | Noted: 2023-12-06 | Resolved: 2024-02-02

## 2024-02-02 PROBLEM — O36.5990 IUGR (INTRAUTERINE GROWTH RESTRICTION) AFFECTING CARE OF MOTHER: Status: ACTIVE | Noted: 2024-02-02

## 2024-02-02 PROBLEM — N12 PYELONEPHRITIS: Status: RESOLVED | Noted: 2021-06-05 | Resolved: 2024-02-02

## 2024-02-02 PROBLEM — O11.3: Status: RESOLVED | Noted: 2023-12-06 | Resolved: 2024-02-02

## 2024-02-02 LAB
ABO GROUP BLD: NORMAL
BACTERIA SPEC AEROBE CULT: NORMAL
BLD GP AB SCN SERPL QL: NEGATIVE
RH BLD: POSITIVE
T&S EXPIRATION DATE: NORMAL

## 2024-02-02 PROCEDURE — 25010000002 ONDANSETRON PER 1 MG: Performed by: OBSTETRICS & GYNECOLOGY

## 2024-02-02 PROCEDURE — 59025 FETAL NON-STRESS TEST: CPT

## 2024-02-02 PROCEDURE — 86850 RBC ANTIBODY SCREEN: CPT | Performed by: OBSTETRICS & GYNECOLOGY

## 2024-02-02 PROCEDURE — 86901 BLOOD TYPING SEROLOGIC RH(D): CPT | Performed by: OBSTETRICS & GYNECOLOGY

## 2024-02-02 PROCEDURE — 76805 OB US >/= 14 WKS SNGL FETUS: CPT | Performed by: RADIOLOGY

## 2024-02-02 PROCEDURE — 76805 OB US >/= 14 WKS SNGL FETUS: CPT

## 2024-02-02 PROCEDURE — 86900 BLOOD TYPING SEROLOGIC ABO: CPT | Performed by: OBSTETRICS & GYNECOLOGY

## 2024-02-02 PROCEDURE — 25810000003 LACTATED RINGERS PER 1000 ML: Performed by: OBSTETRICS & GYNECOLOGY

## 2024-02-02 RX ORDER — ONDANSETRON 2 MG/ML
4 INJECTION INTRAMUSCULAR; INTRAVENOUS EVERY 6 HOURS PRN
Status: DISCONTINUED | OUTPATIENT
Start: 2024-02-02 | End: 2024-02-02 | Stop reason: HOSPADM

## 2024-02-02 RX ORDER — LABETALOL 200 MG/1
400 TABLET, FILM COATED ORAL 3 TIMES DAILY
Status: DISCONTINUED | OUTPATIENT
Start: 2024-02-02 | End: 2024-02-02 | Stop reason: HOSPADM

## 2024-02-02 RX ORDER — HYDROXYZINE HYDROCHLORIDE 25 MG/1
50 TABLET, FILM COATED ORAL NIGHTLY PRN
Status: DISCONTINUED | OUTPATIENT
Start: 2024-02-02 | End: 2024-02-02 | Stop reason: HOSPADM

## 2024-02-02 RX ADMIN — LABETALOL HYDROCHLORIDE 300 MG: 100 TABLET, FILM COATED ORAL at 05:48

## 2024-02-02 RX ADMIN — SODIUM CHLORIDE, POTASSIUM CHLORIDE, SODIUM LACTATE AND CALCIUM CHLORIDE 125 ML/HR: 600; 310; 30; 20 INJECTION, SOLUTION INTRAVENOUS at 13:59

## 2024-02-02 RX ADMIN — ONDANSETRON 4 MG: 2 INJECTION INTRAMUSCULAR; INTRAVENOUS at 12:03

## 2024-02-02 RX ADMIN — HYDROXYZINE HYDROCHLORIDE 50 MG: 25 TABLET ORAL at 00:33

## 2024-02-02 RX ADMIN — NIFEDIPINE 30 MG: 30 TABLET, EXTENDED RELEASE ORAL at 08:05

## 2024-02-02 RX ADMIN — HYDROXYZINE HYDROCHLORIDE 50 MG: 25 TABLET ORAL at 14:11

## 2024-02-02 RX ADMIN — LABETALOL HYDROCHLORIDE 400 MG: 200 TABLET, FILM COATED ORAL at 13:58

## 2024-02-02 RX ADMIN — SODIUM CHLORIDE, POTASSIUM CHLORIDE, SODIUM LACTATE AND CALCIUM CHLORIDE 125 ML/HR: 600; 310; 30; 20 INJECTION, SOLUTION INTRAVENOUS at 00:32

## 2024-02-02 NOTE — PAYOR COMM NOTE
"CONTACT: GUERDA OSMAN RN  UTILIZATION MANAGEMENT DEPT.  [facility]  [address] [place] [address]  PHONE: [phone]  FAX: [phone]      INPATIENT AUTH REQUEST    Abby Woo (38 y.o. Female)       Date of Birth   1985    Social Security Number       Address   [address] [place] [address]    Home Phone   [phone]    MRN   [id number]       Jain   None    Marital Status                               Admission Date   24    Admission Type   Elective    Admitting Provider   Rodri Mason MD    Attending Provider   Rodri Mason MD    Department, Room/Bed   [facility] LABOR DELIVERY, L227       Discharge Date       Discharge Disposition   Transfer to Another Facility    Discharge Destination                                 Attending Provider: Rodri Mason MD    Allergies: No Known Allergies    Isolation: None   Infection: None   Code Status: Prior    Ht: 160 cm (63\")   Wt: 87.9 kg (193 lb 12.8 oz)    Admission Cmt: None   Principal Problem: Pregnancy [Z34.90]                   Active Insurance as of 2024       Primary Coverage       Payor Plan Insurance Group Employer/Plan Group    ANTHEM MEDICAID ANTHEM MEDICAID [id number]       Payor Plan Address Payor Plan Phone Number Payor Plan Fax Number Effective Dates    [address] [address] [phone]  2020 - None Entered    [facility] [id number]         Subscriber Name Subscriber Birth Date Member ID       ABBY WOO 1985 [id number]                     Emergency Contacts        (Rel.) Home Phone Work Phone Mobile Phone    ESTHER MORRISON (Mother) [phone] -- [phone]                 History & Physical        Carly Shearer DO at 24 1241          [facility]  Obstetric History and Physical    Chief Complaint   Patient presents with    Decreased Fetal Movement       Subjective    Patient is a 38 y.o. female  currently at 36w0d, who presents with " "decreased fetal movement last night and limited prenatal care. She has h/o CHTN and is currently taking Labetalol 400mg tid and Procardia 30XL bid although here she has been receiving Labetalol 300mg tid and procardia XL 30mg bid. BP here is normal to mild range and she is w/o symptoms.  Fetus is Trisomy 21 by amniocentesis with nml fetal echo.  U/S here ERNESTINA 4.8 and EFW 3% with large swelling by the fetal stomach possibly representing \"double bubble.\"  She is previous c/s x 3 for a repeat and does desire sterilization with valid consent available.  She has a h/o IVDU remotely and denies current use but was positive for buprenorphine and methamphetamine during her last  admission.  She also has h/o of alcohol use during this gestation.  She has a h/o abnormal cardiac echo but normal this pregnancy.  She is a daily tobacco user.     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    The following portions of the patients history were reviewed and updated as appropriate: current medications, allergies, past medical history, past surgical history, past family history, past social history, and problem list .       Prenatal Information:   Maternal Prenatal Labs  Blood Type ABO Type   Date Value Ref Range Status   2024 O  Final      Rh Status RH type   Date Value Ref Range Status   2024 Positive  Final      Antibody Screen Antibody Screen   Date Value Ref Range Status   2024 Negative  Final      Rapid Urine Drug Screen Barbiturates Screen, Urine   Date Value Ref Range Status   2024 Negative Negative Final     Benzodiazepine Screen, Urine   Date Value Ref Range Status   2024 Negative " "Negative Final     Methadone Screen, Urine   Date Value Ref Range Status   02/01/2024 Negative Negative Final     Opiate Screen   Date Value Ref Range Status   02/01/2024 Negative Negative Final     THC, Screen, Urine   Date Value Ref Range Status   02/01/2024 Negative Negative Final     Cocaine Screen, Urine   Date Value Ref Range Status   02/01/2024 Negative Negative Final     Amphetamine Screen, Urine   Date Value Ref Range Status   02/01/2024 Negative Negative Final     Buprenorphine, Screen, Urine   Date Value Ref Range Status   02/01/2024 Negative Negative Final     Methamphetamine, Ur   Date Value Ref Range Status   02/01/2024 Negative Negative Final     Oxycodone Screen, Urine   Date Value Ref Range Status   02/01/2024 Negative Negative Final     Tricyclic Antidepressants Screen   Date Value Ref Range Status   02/01/2024 Negative Negative Final      Group B Strep Culture No results found for: \"GBSANTIGEN\"           External Prenatal Results       Pregnancy Outside Results - Transcribed From Office Records - See Scanned Records For Details       Test Value Date Time    ABO  O  02/02/24 0949    Rh  Positive  02/02/24 0949    Antibody Screen  Negative  02/02/24 0949       Negative  08/07/23 1433    Varicella IgG       Rubella ^ Immune  08/08/23     Hgb  11.9 g/dL 02/01/24 1948       12.7 g/dL 01/19/24 2121      ^ 10.4 g/dL 12/08/23 1036      ^ 11.9 g/dL 12/07/23 0525      ^ 11.4 g/dL 12/06/23 2114       10.8 g/dL 12/05/23 1913       12.7 g/dL 09/23/23 1810       13.1 g/dL 08/07/23 1433    Hct  36.8 % 02/01/24 1948       37.5 % 01/19/24 2121      ^ 31.7 % 12/08/23 1036      ^ 35.6 % 12/07/23 0525      ^ 34.1 % 12/06/23 2114       32.7 % 12/05/23 1913       37.9 % 09/23/23 1810       39.7 % 08/07/23 1433    Glucose Fasting GTT       Glucose Tolerance Test 1 hour       Glucose Tolerance Test 3 hour       Gonorrhea (discrete) ^ Negative  10/06/23        Not Detected  08/07/23 1552    Chlamydia (discrete) ^ Negative "  10/06/23        Not Detected  23 1552    RPR ^ Non-Reactive  23     VDRL       Syphilis Antibody       HBsAg ^ Negative  23     Herpes Simplex Virus PCR       Herpes Simplex VIrus Culture       HIV ^ Non-Reactive  23     Hep C RNA Quant PCR ^ <1.08 log10 IU/mL 23      ^ <12 IU/mL 23    Hep C Antibody       AFP       Group B Strep       GBS Susceptibility to Clindamycin       GBS Susceptibility to Erythromycin       Fetal Fibronectin       Genetic Testing, Maternal Blood                 Drug Screening       Test Value Date Time    Urine Drug Screen       Amphetamine Screen  Negative  24 1936       Negative  23 1922       Negative  23 1811    Barbiturate Screen  Negative  24 1936       Negative  23 1922       Negative  23 1811    Benzodiazepine Screen  Negative  24 1936       Negative  23 1922       Negative  23 1811    Methadone Screen  Negative  24 1936       Negative  23 1922       Negative  23 1811    Phencyclidine Screen  Negative  24 1936       Negative  23 1922       Negative  23 1811    Opiates Screen  Negative  24 1936       Negative  23 1922       Negative  23 1811    THC Screen  Negative  24 1936       Negative  23 1922       Negative  23 1811    Cocaine Screen       Propoxyphene Screen  Negative  23 1811    Buprenorphine Screen  Negative  24 1936       Negative  23 1922       Negative  23 1811    Methamphetamine Screen       Oxycodone Screen  Negative  24 1936       Negative  23 1922       Negative  23 1811    Tricyclic Antidepressants Screen  Negative  24 1936       Negative  23 1922       Negative  23 1811              Legend    ^: Historical                              Past OB History:     OB History    Para Term  AB Living   6 3 2 1 2 3   SAB IAB Ectopic  Molar Multiple Live Births   2 0 0 0 0 3      # Outcome Date GA Lbr Jarret/2nd Weight Sex Delivery Anes PTL Lv   6 Current            5  20 35w1d  2460 g (5 lb 6.8 oz) F CS-LTranv Spinal Y BOB      Name: MILY POLANCO      Apgar1: 8  Apgar5: 9   4 SAB 10/2015 6w0d    SAB      3 Term 08/03/10 40w0d  2920 g (6 lb 7 oz) F CS-LTranv EPI N BOB      Complications: Oligohydramnios   2 Term 09 40w0d  3487 g (7 lb 11 oz) M CS-LTranv EPI N BOB      Complications: Failure to progress in labor   1 2004 8w0d    SAB         Obstetric Comments   FOB #1 Pregnancy #1 SAB at 8 weeks   FOB #1 Pregnancy #2 P C/s at 40 weeks FTP, male   FOB #1 Pregnancy #3 R C/S at 40 weeks, female   FOB #1 Pregnancy #4 SAB at 6 weeks   FOB #1 Pregnancy #5 R C/S at 35 1/7 weeks, female   FOB #2 Pregnancy #6  current       Past Medical History: Past Medical History:   Diagnosis Date    Anemia 1986    Anxiety 2002    COVID-19     Depression     Hepatitis C 2004    Horseshoe kidney     HPV (human papilloma virus) infection     Hypertension 2009    Neuropathy     PID (pelvic inflammatory disease)     Scheuermann's disease       Past Surgical History Past Surgical History:   Procedure Laterality Date     SECTION  2020     SECTION N/A 2020    Procedure:  SECTION REPEAT;  Surgeon: Sonya Barton DO;  Location: [facility] LABOR DELIVERY;  Service: Obstetrics/Gynecology;  Laterality: N/A;     SECTION  2010      Family History: Family History   Problem Relation Age of Onset    Alcohol abuse Father     Depression Father     Hypertension Father     Stroke Father     COPD Mother     Diabetes Mother     Hypertension Mother     Alcohol abuse Paternal Grandfather     Kidney disease Paternal Grandfather     Stroke Paternal Grandfather     Kidney cancer Paternal Grandmother     Coronary artery disease Paternal Grandmother     Hypertension Paternal Grandmother     Asthma  Maternal Grandmother     Kidney cancer Maternal Grandmother     Depression Maternal Grandmother     Diabetes Maternal Grandmother     Hypertension Maternal Grandmother     Kidney disease Maternal Grandmother     Alcohol abuse Maternal Grandfather     Lung cancer Maternal Grandfather     Breast cancer Neg Hx       Social History:  reports that she has been smoking cigarettes. She has been smoking an average of .25 packs per day. She has never used smokeless tobacco.   reports no history of alcohol use.   reports that she does not currently use drugs after having used the following drugs: Methamphetamines, Oxycodone, Amphetamines, Marijuana, and Benzodiazepines.        Review of Systems                  Review of Systems   Pertinent items are noted in HPI, all other systems reviewed and negative      Objective    Vital Signs Range for the last 24 hours  Temperature: Temp:  [97.5 °F (36.4 °C)-98 °F (36.7 °C)] 98 °F (36.7 °C)   Temp Source: Temp src: Oral   BP: BP: (128-163)/(66-98) 140/66   Pulse: Heart Rate:  [63-86] 86   Respirations: Resp:  [16-18] 16   Weight: Weight:  [87.9 kg (193 lb 12.8 oz)] 87.9 kg (193 lb 12.8 oz)     Physical Examination: General appearance - alert, well appearing, and in no distress  Chest - clear to auscultation, no wheezes, rales or rhonchi, symmetric air entry  Heart - normal rate, regular rhythm, normal S1, S2, no murmurs, rubs, clicks or gallops  Abdomen - soft, nontender, nondistended, no masses or organomegaly  Extremities - peripheral pulses normal, no pedal edema, no clubbing or cyanosis    Presentation: cephalic                         Assessment & Plan      Pregnancy      Assessment & Plan    Assessment/Plan:  1.  Intrauterine pregnancy at 36w0d weeks gestation with reactive fetal status.    2.  Oligohydramnios and Severe IUGR- will need fetal monitoring and MCA dopplers.    3. CHTN- mild range BP- will increase her Labetalol to her 400mg tid dose and continue procardia and  current 24 hour urine collection.    4. Previous c/s x 3 for repeat with BTL- consent signed.    5. 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  I have discussed with our in house Neonatologist today the possiblitiy for delivery given all of the above and she recommends delivery at  for proximity to NICU there as  will possibly need pediatric surgery services and supportive care.   6. H/O IVDU/Substance abuse , alcohol abuse and current tobacco dependency.  - neg UDS here.    7. H/O shingles this gestation- resolved.       Carly Shearer DO  2024  12:41 EST    Electronically signed by Carly Shearer DO at 24 1300       Facility-Administered Medications as of 2024   Medication Dose Route Frequency Provider Last Rate Last Admin    [COMPLETED] betamethasone acetate-betamethasone sodium phosphate (CELESTONE SOLUSPAN) injection 12 mg  12 mg Intramuscular Once Rodri Mason MD   12 mg at 24 2149    hydrALAZINE (APRESOLINE) injection 5-10 mg  5-10 mg Intravenous Q20 Min PRN Rodri Mason MD        Or    labetalol (NORMODYNE,TRANDATE) injection 20-80 mg  20-80 mg Intravenous Q10 Min PRN Rodri Mason MD        Or    NIFEdipine (PROCARDIA) capsule 10-20 mg  10-20 mg Oral Q20 Min PRN Rodri Mason MD        hydrOXYzine (ATARAX) tablet 50 mg  50 mg Oral Nightly PRN Rodri Mason MD   50 mg at 24 1411    labetalol (NORMODYNE) tablet 400 mg  400 mg Oral TID Carly Shearer DO   400 mg at 24 1358    lactated ringers infusion  125 mL/hr Intravenous Continuous Rodri Mason  mL/hr at 24 1359 125 mL/hr at 24 1359    NIFEdipine CC (ADALAT CC) 24 hr tablet 30 mg  30 mg Oral BID Rodri Mason MD   30 mg at 24 0805    ondansetron (ZOFRAN) injection 4 mg  4 mg Intravenous Q6H PRN Carly Shearer DO   4 mg at 24 1203     Orders (all)        Start     Ordered    24 1400  labetalol (NORMODYNE) tablet 400 mg  3 Times Daily         24 1301     02/02/24 1314  Discharge patient  Once         02/02/24 1313    02/02/24 1150  ondansetron (ZOFRAN) injection 4 mg  Every 6 Hours PRN         02/02/24 1151    02/02/24 0848  US Ob 14 + Weeks Single or First Gestation  1 Time Imaging        Comments: Growth and ERNESTINA    02/02/24 0847    02/02/24 0848  Type & Screen  STAT         02/02/24 0848    02/02/24 0021  hydrOXYzine (ATARAX) tablet 50 mg  Nightly PRN         02/02/24 0021    02/01/24 2300  lactated ringers infusion  Continuous         02/01/24 2211 02/01/24 2200  NIFEdipine CC (ADALAT CC) 24 hr tablet 30 mg  2 Times Daily         02/01/24 2027 02/01/24 2115  betamethasone acetate-betamethasone sodium phosphate (CELESTONE SOLUSPAN) injection 12 mg  Once         02/01/24 2027 02/01/24 2115  labetalol (NORMODYNE) tablet 300 mg  3 Times Daily,   Status:  Discontinued         02/01/24 2027 02/01/24 2032  Fetal Nonstress Test  Once        Comments: Patient presents with:  Decreased Fetal Movement      02/01/24 2031 02/01/24 2028  Diet: Regular/House Diet; Texture: Regular Texture (IDDSI 7); Fluid Consistency: Thin (IDDSI 0)  Diet Effective Now         02/01/24 2027 02/01/24 2027  Admit To Obstetrics Inpatient  Once         02/01/24 2027 02/01/24 2026  hydrALAZINE (APRESOLINE) injection 5-10 mg  Every 20 Minutes PRN        See Hyperspace for full Linked Orders Report.    02/01/24 2027 02/01/24 2026  labetalol (NORMODYNE,TRANDATE) injection 20-80 mg  Every 10 Minutes PRN        See Hyperspace for full Linked Orders Report.    02/01/24 2027 02/01/24 2026  NIFEdipine (PROCARDIA) capsule 10-20 mg  Every 20 Minutes PRN        See Hyperspace for full Linked Orders Report.    02/01/24 2027 02/01/24 1958  Urine Culture - Urine, Urine, Clean Catch  Once         02/01/24 1957 02/01/24 1948  Fentanyl, Urine - Urine, Clean Catch  Once         02/01/24 1947    02/01/24 1936  Uric Acid  STAT         02/01/24 1936 02/01/24 1936  CBC (No Diff)  STAT          02/01/24 1936 02/01/24 1936  Comprehensive Metabolic Panel  STAT         02/01/24 1936 02/01/24 1936  Urinalysis With Culture If Indicated - Urine, Clean Catch  STAT         02/01/24 1936 02/01/24 1936  Urine Drug Screen - Urine, Clean Catch  STAT         02/01/24 1936 02/01/24 1936  Rapid Assay For ROM - Amniotic Fluid, Amniotic Sac  STAT         02/01/24 1936 02/01/24 1935  Lactate Dehydrogenase  STAT         02/01/24 1936 02/01/24 1814  US Fetal Biophysical Profile;Without Non-Stress Testing  1 Time Imaging         02/01/24 1813 02/01/24 1802  US Fetal Biophysical Profile;Without Non-Stress Testing  1 Time Imaging,   Status:  Canceled         02/01/24 1802    --  doxylamine-pyridoxine (DICLEGIS) 10-10 MG tablet delayed-release EC tablet  Nightly PRN         02/01/24 2112    --  lidocaine (LMX) 4 % cream  As Needed         02/01/24 2125    --  metroNIDAZOLE (METROGEL) 0.75 % vaginal gel  2 Times Daily         02/01/24 2125    --  naloxone (NARCAN) 4 MG/0.1ML nasal spray  As Needed,   Status:  Discontinued         02/01/24 2125                  Physician Progress Notes (all)    No notes of this type exist for this encounter.       Consult Notes (all)    No notes of this type exist for this encounter.          Discharge Summary        Carly Shearer DO at 02/02/24 1303          Discharge Summary    Admit Date: 2/1/2024  4:49 PM    Admit Diagnosis: Pregnancy [Z34.90]    Date of Discharge:  2/2/2024    Discharge Diagnosis: Same        Hospital Course  Patient is a 38 y.o. female, G 6, P 2123. Patient was admitted due to decreased fetal movement last night and limited prenatal care. She has h/o CHTN and is currently taking Labetalol 400mg tid and Procardia 30XL bid . BP here is normal to mild range and she is w/o symptoms. She is collecting a 24 hour urine.  Fetus is Trisomy 21 by amniocentesis with nml fetal echo.  U/S here ERNESTINA 4.8 and EFW 3% with large swelling by the fetal stomach  "possibly representing \"double bubble.\"  She is previous c/s x 3 for a repeat and does desire sterilization with valid consent available.  She has a h/o IVDU remotely and denies current use but was positive for buprenorphine and methamphetamine during her last  admission.  She also has h/o of alcohol use during this gestation.  She has a h/o abnormal cardiac echo but normal this pregnancy.  She is a daily tobacco user.      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    The following portions of the patients history were reviewed and updated as appropriate: current medications, allergies, past medical history, past surgical history, past family history, past social history, and problem list .            Vital Signs  Temp:  [97.5 °F (36.4 °C)-98 °F (36.7 °C)] 98 °F (36.7 °C)  Heart Rate:  [63-86] 86  Resp:  [16-18] 16  BP: (128-163)/(66-98) 140/66    Review of Systems    The following systems were reviewed and negative; Contraction, LOF, VB,  HA, scotomata, N/V , CP, SOB     The following systems were reviewed and positive; Decreased fetal movement last pm.             Physical Exam:      General Appearance:   NAD   Head:   NC   Eyes:           PEARLA   Ears:  deferred   Throat: deferred   Neck: No JVD   Back:    No CVAT   Lungs:    CTAB    Heart:   RRR    Breast Exam:  deferred   Abdomen:    gravid uterus.  Non-tender   Genitalia:   deferred   Extremities:  No c/c/e   Pulses:  Normal                  A/P:  1.  Intrauterine pregnancy at 36w0d weeks gestation with reactive fetal status.    2.  Oligohydramnios and Severe IUGR- will need fetal monitoring and MCA dopplers.    3. CHTN- mild range BP- will increase " her Labetalol to her 400mg tid dose and continue procardia and current 24 hour urine collection.    4. Previous c/s x 3 for repeat with BTL- consent signed.    5. 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  I have discussed with our in house Neonatologist today the possiblitiy for delivery given all of the above and she recommends delivery at  for proximity to NICU there as  will possibly need pediatric surgery services and supportive care. She has been accepted there.  6. H/O IVDU/Substance abuse , alcohol abuse and current tobacco dependency.  - neg UDS here.    7. H/O shingles this gestation- resolved.         Condition on Discharge:  Stable         Ouamr Matthews DO  24  13:03 EST      Electronically signed by Oumar Matthews DO at 24 1309       Discharge Order (From admission, onward)       Start     Ordered    24 1314  Discharge patient  Once        Expected Discharge Date: 24   Discharge Disposition: Transfer to Another Facility   Physician of Record for Attribution - Please select from Treatment Team: OUMAR MATTHEWS [[id number]]   Review needed by CMO to determine Physician of Record: No   Please choose which facility the patient is currently admitted if they are being discharged to another facility or unit.: [facility]      Question Answer Comment   Physician of Record for Attribution - Please select from Treatment Team OUMAR MATTHEWS    Review needed by CMO to determine Physician of Record No    Please choose which facility the patient is currently admitted if they are being discharged to another facility or unit. [facility]        24 [id number]                   General Sunscreen Counseling: I recommended a broad spectrum sunscreen with a SPF of 70 or higher.  I explained that SPF 30 sunscreens block approximately 97 percent of the sun's harmful rays.  Sunscreens should be applied at least 15 minutes prior to expected sun exposure and then every 2 hours after that as long as sun exposure continues. If swimming or exercising sunscreen should be reapplied every 45 minutes to an hour after getting wet or sweating.  One ounce, or the equivalent of a shot glass full of sunscreen, is adequate to protect the skin not covered by a bathing suit. I also recommended a lip balm with a sunscreen as well. Sun protective clothing can be used in lieu of sunscreen but must be worn the entire time you are exposed to the sun's rays. Detail Level: Detailed

## 2024-02-02 NOTE — H&P
"ERIC Prince  Obstetric History and Physical    Chief Complaint   Patient presents with    Decreased Fetal Movement       Subjective     Patient is a 38 y.o. female  currently at 36w0d, who presents with decreased fetal movement last night and limited prenatal care. She has h/o CHTN and is currently taking Labetalol 400mg tid and Procardia 30XL bid although here she has been receiving Labetalol 300mg tid and procardia XL 30mg bid. BP here is normal to mild range and she is w/o symptoms.  Fetus is Trisomy 21 by amniocentesis with nml fetal echo.  U/S here ERNESTINA 4.8 and EFW 3% with large swelling by the fetal stomach possibly representing \"double bubble.\"  She is previous c/s x 3 for a repeat and does desire sterilization with valid consent available.  She has a h/o IVDU remotely and denies current use but was positive for buprenorphine and methamphetamine during her last  admission.  She also has h/o of alcohol use during this gestation.  She has a h/o abnormal cardiac echo but normal this pregnancy.  She is a daily tobacco user.     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    The following portions of the patients history were reviewed and updated as appropriate: current medications, allergies, past medical history, past surgical history, past family history, past social history, and problem list .       Prenatal Information:   Maternal Prenatal Labs  Blood Type ABO Type   Date Value Ref Range Status   2024 O  Final      Rh Status RH type   Date Value Ref Range Status   2024 Positive  Final      Antibody Screen Antibody Screen   Date Value Ref Range Status   2024 Negative  Final    " "  Rapid Urine Drug Screen Barbiturates Screen, Urine   Date Value Ref Range Status   02/01/2024 Negative Negative Final     Benzodiazepine Screen, Urine   Date Value Ref Range Status   02/01/2024 Negative Negative Final     Methadone Screen, Urine   Date Value Ref Range Status   02/01/2024 Negative Negative Final     Opiate Screen   Date Value Ref Range Status   02/01/2024 Negative Negative Final     THC, Screen, Urine   Date Value Ref Range Status   02/01/2024 Negative Negative Final     Cocaine Screen, Urine   Date Value Ref Range Status   02/01/2024 Negative Negative Final     Amphetamine Screen, Urine   Date Value Ref Range Status   02/01/2024 Negative Negative Final     Buprenorphine, Screen, Urine   Date Value Ref Range Status   02/01/2024 Negative Negative Final     Methamphetamine, Ur   Date Value Ref Range Status   02/01/2024 Negative Negative Final     Oxycodone Screen, Urine   Date Value Ref Range Status   02/01/2024 Negative Negative Final     Tricyclic Antidepressants Screen   Date Value Ref Range Status   02/01/2024 Negative Negative Final      Group B Strep Culture No results found for: \"GBSANTIGEN\"           External Prenatal Results       Pregnancy Outside Results - Transcribed From Office Records - See Scanned Records For Details       Test Value Date Time    ABO  O  02/02/24 0949    Rh  Positive  02/02/24 0949    Antibody Screen  Negative  02/02/24 0949       Negative  08/07/23 1433    Varicella IgG       Rubella ^ Immune  08/08/23     Hgb  11.9 g/dL 02/01/24 1948       12.7 g/dL 01/19/24 2121      ^ 10.4 g/dL 12/08/23 1036      ^ 11.9 g/dL 12/07/23 0525      ^ 11.4 g/dL 12/06/23 2114       10.8 g/dL 12/05/23 1913       12.7 g/dL 09/23/23 1810       13.1 g/dL 08/07/23 1433    Hct  36.8 % 02/01/24 1948       37.5 % 01/19/24 2121      ^ 31.7 % 12/08/23 1036      ^ 35.6 % 12/07/23 0525      ^ 34.1 % 12/06/23 2114       32.7 % 12/05/23 1913       37.9 % 09/23/23 1810       39.7 % 08/07/23 1433    " Glucose Fasting GTT       Glucose Tolerance Test 1 hour       Glucose Tolerance Test 3 hour       Gonorrhea (discrete) ^ Negative  10/06/23        Not Detected  08/07/23 1552    Chlamydia (discrete) ^ Negative  10/06/23        Not Detected  08/07/23 1552    RPR ^ Non-Reactive  08/08/23     VDRL       Syphilis Antibody       HBsAg ^ Negative  08/08/23     Herpes Simplex Virus PCR       Herpes Simplex VIrus Culture       HIV ^ Non-Reactive  08/08/23     Hep C RNA Quant PCR ^ <1.08 log10 IU/mL 12/06/23 2121      ^ <12 IU/mL 12/06/23 2121    Hep C Antibody       AFP       Group B Strep       GBS Susceptibility to Clindamycin       GBS Susceptibility to Erythromycin       Fetal Fibronectin       Genetic Testing, Maternal Blood                 Drug Screening       Test Value Date Time    Urine Drug Screen       Amphetamine Screen  Negative  02/01/24 1936       Negative  12/05/23 1922       Negative  09/23/23 1811    Barbiturate Screen  Negative  02/01/24 1936       Negative  12/05/23 1922       Negative  09/23/23 1811    Benzodiazepine Screen  Negative  02/01/24 1936       Negative  12/05/23 1922       Negative  09/23/23 1811    Methadone Screen  Negative  02/01/24 1936       Negative  12/05/23 1922       Negative  09/23/23 1811    Phencyclidine Screen  Negative  02/01/24 1936       Negative  12/05/23 1922       Negative  09/23/23 1811    Opiates Screen  Negative  02/01/24 1936       Negative  12/05/23 1922       Negative  09/23/23 1811    THC Screen  Negative  02/01/24 1936       Negative  12/05/23 1922       Negative  09/23/23 1811    Cocaine Screen       Propoxyphene Screen  Negative  09/23/23 1811    Buprenorphine Screen  Negative  02/01/24 1936       Negative  12/05/23 1922       Negative  09/23/23 1811    Methamphetamine Screen       Oxycodone Screen  Negative  02/01/24 1936       Negative  12/05/23 1922       Negative  09/23/23 1811    Tricyclic Antidepressants Screen  Negative  02/01/24 1936       Negative   23 1922       Negative  23 1811              Legend    ^: Historical                              Past OB History:     OB History    Para Term  AB Living   6 3 2 1 2 3   SAB IAB Ectopic Molar Multiple Live Births   2 0 0 0 0 3      # Outcome Date GA Lbr Jarret/2nd Weight Sex Delivery Anes PTL Lv   6 Current            5  20 35w1d  2460 g (5 lb 6.8 oz) F CS-LTranv Spinal Y BOB      Name: MILY POLANCO      Apgar1: 8  Apgar5: 9   4 SAB 10/2015 6w0d    SAB      3 Term 08/03/10 40w0d  2920 g (6 lb 7 oz) F CS-LTranv EPI N BOB      Complications: Oligohydramnios   2 Term 09 40w0d  3487 g (7 lb 11 oz) M CS-LTranv EPI N BOB      Complications: Failure to progress in labor   1 SAB  8w0d    SAB         Obstetric Comments   FOB #1 Pregnancy #1 SAB at 8 weeks   FOB #1 Pregnancy #2 P C/s at 40 weeks FTP, male   FOB #1 Pregnancy #3 R C/S at 40 weeks, female   FOB #1 Pregnancy #4 SAB at 6 weeks   FOB #1 Pregnancy #5 R C/S at 35 1/7 weeks, female   FOB #2 Pregnancy #6  current       Past Medical History: Past Medical History:   Diagnosis Date    Anemia 1986    Anxiety 2002    COVID-19     Depression     Hepatitis C 2004    Horseshoe kidney     HPV (human papilloma virus) infection     Hypertension 2009    Neuropathy 2009    PID (pelvic inflammatory disease)     Scheuermann's disease       Past Surgical History Past Surgical History:   Procedure Laterality Date     SECTION  2020     SECTION N/A 2020    Procedure:  SECTION REPEAT;  Surgeon: Sonya Barton DO;  Location: [facility] LABOR DELIVERY;  Service: Obstetrics/Gynecology;  Laterality: N/A;     SECTION  2010      Family History: Family History   Problem Relation Age of Onset    Alcohol abuse Father     Depression Father     Hypertension Father     Stroke Father     COPD Mother     Diabetes Mother     Hypertension Mother     Alcohol abuse Paternal Grandfather      Kidney disease Paternal Grandfather     Stroke Paternal Grandfather     Kidney cancer Paternal Grandmother     Coronary artery disease Paternal Grandmother     Hypertension Paternal Grandmother     Asthma Maternal Grandmother     Kidney cancer Maternal Grandmother     Depression Maternal Grandmother     Diabetes Maternal Grandmother     Hypertension Maternal Grandmother     Kidney disease Maternal Grandmother     Alcohol abuse Maternal Grandfather     Lung cancer Maternal Grandfather     Breast cancer Neg Hx       Social History:  reports that she has been smoking cigarettes. She has been smoking an average of .25 packs per day. She has never used smokeless tobacco.   reports no history of alcohol use.   reports that she does not currently use drugs after having used the following drugs: Methamphetamines, Oxycodone, Amphetamines, Marijuana, and Benzodiazepines.        Review of Systems                  Review of Systems   Pertinent items are noted in HPI, all other systems reviewed and negative      Objective     Vital Signs Range for the last 24 hours  Temperature: Temp:  [97.5 °F (36.4 °C)-98 °F (36.7 °C)] 98 °F (36.7 °C)   Temp Source: Temp src: Oral   BP: BP: (128-163)/(66-98) 140/66   Pulse: Heart Rate:  [63-86] 86   Respirations: Resp:  [16-18] 16   Weight: Weight:  [87.9 kg (193 lb 12.8 oz)] 87.9 kg (193 lb 12.8 oz)     Physical Examination: General appearance - alert, well appearing, and in no distress  Chest - clear to auscultation, no wheezes, rales or rhonchi, symmetric air entry  Heart - normal rate, regular rhythm, normal S1, S2, no murmurs, rubs, clicks or gallops  Abdomen - soft, nontender, nondistended, no masses or organomegaly  Extremities - peripheral pulses normal, no pedal edema, no clubbing or cyanosis    Presentation: cephalic                         Assessment & Plan       Pregnancy      Assessment & Plan    Assessment/Plan:  1.  Intrauterine pregnancy at 36w0d weeks gestation with  reactive fetal status.    2.  Oligohydramnios and Severe IUGR- will need fetal monitoring and MCA dopplers.    3. CHTN- mild range BP- will increase her Labetalol to her 400mg tid dose and continue procardia and current 24 hour urine collection.    4. Previous c/s x 3 for repeat with BTL- consent signed.    5. 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  I have discussed with our in house Neonatologist today the possiblitiy for delivery given all of the above and she recommends delivery at  for proximity to NICU there as  will possibly need pediatric surgery services and supportive care.   6. H/O IVDU/Substance abuse , alcohol abuse and current tobacco dependency.  - neg UDS here.    7. H/O shingles this gestation- resolved.       Carly Shearer DO  2024  12:41 EST

## 2024-02-02 NOTE — NON STRESS TEST
Kacy Woo, a  at 36w0d with an VIOLA of 3/1/2024, by Ultrasound, was seen at [facility] LABOR DELIVERY for a nonstress test.    Chief Complaint   Patient presents with    Decreased Fetal Movement       Patient Active Problem List   Diagnosis    Clinical diagnosis of severe acute respiratory syndrome coronavirus 2 (SARS-CoV-2) disease    Elevated blood pressure affecting pregnancy, antepartum    Hypertension affecting pregnancy in second trimester    Antepartum multigravida of advanced maternal age    Previous  delivery affecting pregnancy    Pregnancy    Maternal chronic hypertension in third trimester    27 weeks gestation of pregnancy    Herpes zoster with complication    IUGR (intrauterine growth restriction) affecting care of mother       Start Time: 1200  Stop Time: 1230    Interpretation A  Nonstress Test Interpretation A: Reactive  Comments A: Verified by Garima OLSON RN

## 2024-02-02 NOTE — PLAN OF CARE
Goal Outcome Evaluation:  Plan of Care Reviewed With: patient           Outcome Evaluation: VSS. SCHEDULED HYPERTENSIVE MEDICATION GIVEN. CELESTONE ADMINISTERED. PLAN OF CARE ONGOING.

## 2024-02-02 NOTE — DISCHARGE SUMMARY
"Discharge Summary    Admit Date: 2024  4:49 PM    Admit Diagnosis: Pregnancy [Z34.90]    Date of Discharge:  2024    Discharge Diagnosis: Same        Hospital Course  Patient is a 38 y.o. female, G 6, P 2123. Patient was admitted due to decreased fetal movement last night and limited prenatal care. She has h/o CHTN and is currently taking Labetalol 400mg tid and Procardia 30XL bid . BP here is normal to mild range and she is w/o symptoms. She is collecting a 24 hour urine.  Fetus is Trisomy 21 by amniocentesis with nml fetal echo.  U/S here ERNESTINA 4.8 and EFW 3% with large swelling by the fetal stomach possibly representing \"double bubble.\"  She is previous c/s x 3 for a repeat and does desire sterilization with valid consent available.  She has a h/o IVDU remotely and denies current use but was positive for buprenorphine and methamphetamine during her last  admission.  She also has h/o of alcohol use during this gestation.  She has a h/o abnormal cardiac echo but normal this pregnancy.  She is a daily tobacco user.      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    The following portions of the patients history were reviewed and updated as appropriate: current medications, allergies, past medical history, past surgical history, past family history, past social history, and problem list .            Vital Signs  Temp:  [97.5 °F (36.4 °C)-98 °F (36.7 °C)] 98 °F (36.7 °C)  Heart Rate:  [63-86] 86  Resp:  [16-18] 16  BP: (128-163)/(66-98) 140/66    Review of Systems    The following systems were reviewed and negative; Contraction, LOF, VB,  HA, scotomata, N/V , CP, SOB     The following systems were " reviewed and positive; Decreased fetal movement last pm.             Physical Exam:      General Appearance:   NAD   Head:   NC   Eyes:           PEARLA   Ears:  deferred   Throat: deferred   Neck: No JVD   Back:    No CVAT   Lungs:    CTAB    Heart:   RRR    Breast Exam:  deferred   Abdomen:    gravid uterus.  Non-tender   Genitalia:   deferred   Extremities:  No c/c/e   Pulses:  Normal                  A/P:  1.  Intrauterine pregnancy at 36w0d weeks gestation with reactive fetal status.    2.  Oligohydramnios and Severe IUGR- will need fetal monitoring and MCA dopplers.    3. CHTN- mild range BP- will increase her Labetalol to her 400mg tid dose and continue procardia and current 24 hour urine collection.    4. Previous c/s x 3 for repeat with BTL- consent signed.    5. 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  I have discussed with our in house Neonatologist today the possiblitiy for delivery given all of the above and she recommends delivery at  for proximity to NICU there as  will possibly need pediatric surgery services and supportive care. She has been accepted there.  6. H/O IVDU/Substance abuse , alcohol abuse and current tobacco dependency.  - neg UDS here.    7. H/O shingles this gestation- resolved.         Condition on Discharge:  Stable         Carly Shearer DO  24  13:03 EST

## 2024-02-02 NOTE — NON STRESS TEST
Kacy Woo, a  at 36w0d with an VIOLA of 3/1/2024, by Ultrasound, was seen at [facility] LABOR DELIVERY for a nonstress test.    Chief Complaint   Patient presents with    Decreased Fetal Movement       Patient Active Problem List   Diagnosis    Pyelonephritis    Clinical diagnosis of severe acute respiratory syndrome coronavirus 2 (SARS-CoV-2) disease    Elevated blood pressure affecting pregnancy, antepartum    Hypertension affecting pregnancy in second trimester    Antepartum multigravida of advanced maternal age    Previous  delivery affecting pregnancy    Pregnancy    Maternal chronic hypertension in third trimester    Severe pre-eclampsia in third trimester    27 weeks gestation of pregnancy    Pre-existing hypertension with pre-eclampsia, third trimester    Herpes zoster with complication       Start Time: 128  Stop Time: 148    Interpretation A  Nonstress Test Interpretation A: Reactive  Comments A: VERIFIED BY GABRIEL BARBOSA RN

## 2024-06-05 ENCOUNTER — HOSPITAL ENCOUNTER (OUTPATIENT)
Dept: MAMMOGRAPHY | Facility: HOSPITAL | Age: 39
Discharge: HOME | End: 2024-06-05
Payer: MEDICAID

## 2024-06-05 ENCOUNTER — APPOINTMENT (OUTPATIENT)
Dept: MAMMOGRAPHY | Facility: HOSPITAL | Age: 39
End: 2024-06-05
Payer: MEDICAID

## 2024-06-05 ENCOUNTER — HOSPITAL ENCOUNTER (OUTPATIENT)
Dept: ULTRASOUND IMAGING | Facility: HOSPITAL | Age: 39
Discharge: HOME | End: 2024-06-05
Payer: MEDICAID

## 2024-06-05 DIAGNOSIS — N64.4 BREAST PAIN: ICD-10-CM

## 2024-06-05 DIAGNOSIS — N63.12 UNSPECIFIED LUMP IN THE RIGHT BREAST, UPPER INNER QUADRANT: ICD-10-CM

## 2024-06-05 PROCEDURE — G0279 TOMOSYNTHESIS, MAMMO: HCPCS

## 2024-06-05 PROCEDURE — 77066 DX MAMMO INCL CAD BI: CPT

## 2024-07-09 ENCOUNTER — APPOINTMENT (OUTPATIENT)
Dept: CT IMAGING | Facility: HOSPITAL | Age: 39
End: 2024-07-09
Payer: MEDICAID

## 2024-07-09 ENCOUNTER — HOSPITAL ENCOUNTER (EMERGENCY)
Facility: HOSPITAL | Age: 39
Discharge: HOME | End: 2024-07-09
Attending: STUDENT IN AN ORGANIZED HEALTH CARE EDUCATION/TRAINING PROGRAM
Payer: MEDICAID

## 2024-07-09 VITALS
OXYGEN SATURATION: 97 % | TEMPERATURE: 98.3 F | DIASTOLIC BLOOD PRESSURE: 100 MMHG | SYSTOLIC BLOOD PRESSURE: 152 MMHG | WEIGHT: 195 LBS | HEART RATE: 113 BPM | HEIGHT: 63 IN | RESPIRATION RATE: 17 BRPM | BODY MASS INDEX: 34.55 KG/M2

## 2024-07-09 DIAGNOSIS — S01.01XA LACERATION OF SCALP, INITIAL ENCOUNTER: Primary | ICD-10-CM

## 2024-07-09 LAB
ALBUMIN SERPL-MCNC: 4.2 G/DL (ref 3.5–5.2)
ALBUMIN/GLOB SERPL: 1.1 G/DL
ALP SERPL-CCNC: 92 U/L (ref 39–117)
ALT SERPL W P-5'-P-CCNC: 26 U/L (ref 1–33)
ANION GAP SERPL CALCULATED.3IONS-SCNC: 12.5 MMOL/L (ref 5–15)
AST SERPL-CCNC: 35 U/L (ref 1–32)
BASOPHILS # BLD AUTO: 0.05 10*3/MM3 (ref 0–0.2)
BASOPHILS NFR BLD AUTO: 0.4 % (ref 0–1.5)
BILIRUB SERPL-MCNC: 0.2 MG/DL (ref 0–1.2)
BUN SERPL-MCNC: 11 MG/DL (ref 6–20)
BUN/CREAT SERPL: 12.5 (ref 7–25)
CALCIUM SPEC-SCNC: 9.4 MG/DL (ref 8.6–10.5)
CHLORIDE SERPL-SCNC: 100 MMOL/L (ref 98–107)
CO2 SERPL-SCNC: 23.5 MMOL/L (ref 22–29)
CREAT SERPL-MCNC: 0.88 MG/DL (ref 0.57–1)
DEPRECATED RDW RBC AUTO: 42.1 FL (ref 37–54)
EGFRCR SERPLBLD CKD-EPI 2021: 86.9 ML/MIN/1.73
EOSINOPHIL # BLD AUTO: 0.07 10*3/MM3 (ref 0–0.4)
EOSINOPHIL NFR BLD AUTO: 0.5 % (ref 0.3–6.2)
ERYTHROCYTE [DISTWIDTH] IN BLOOD BY AUTOMATED COUNT: 13.6 % (ref 12.3–15.4)
GLOBULIN UR ELPH-MCNC: 4 GM/DL
GLUCOSE SERPL-MCNC: 110 MG/DL (ref 65–99)
HCG SERPL QL: NEGATIVE
HCT VFR BLD AUTO: 40.8 % (ref 34–46.6)
HGB BLD-MCNC: 13.3 G/DL (ref 12–15.9)
HOLD SPECIMEN: NORMAL
IMM GRANULOCYTES # BLD AUTO: 0.05 10*3/MM3 (ref 0–0.05)
IMM GRANULOCYTES NFR BLD AUTO: 0.4 % (ref 0–0.5)
LYMPHOCYTES # BLD AUTO: 2.6 10*3/MM3 (ref 0.7–3.1)
LYMPHOCYTES NFR BLD AUTO: 20 % (ref 19.6–45.3)
MCH RBC QN AUTO: 27.8 PG (ref 26.6–33)
MCHC RBC AUTO-ENTMCNC: 32.6 G/DL (ref 31.5–35.7)
MCV RBC AUTO: 85.4 FL (ref 79–97)
MONOCYTES # BLD AUTO: 0.71 10*3/MM3 (ref 0.1–0.9)
MONOCYTES NFR BLD AUTO: 5.5 % (ref 5–12)
NEUTROPHILS NFR BLD AUTO: 73.2 % (ref 42.7–76)
NEUTROPHILS NFR BLD AUTO: 9.54 10*3/MM3 (ref 1.7–7)
NRBC BLD AUTO-RTO: 0 /100 WBC (ref 0–0.2)
PLATELET # BLD AUTO: 372 10*3/MM3 (ref 140–450)
PMV BLD AUTO: 10.2 FL (ref 6–12)
POTASSIUM SERPL-SCNC: 4.4 MMOL/L (ref 3.5–5.2)
PROT SERPL-MCNC: 8.2 G/DL (ref 6–8.5)
RBC # BLD AUTO: 4.78 10*6/MM3 (ref 3.77–5.28)
SODIUM SERPL-SCNC: 136 MMOL/L (ref 136–145)
WBC NRBC COR # BLD AUTO: 13.02 10*3/MM3 (ref 3.4–10.8)
WHOLE BLOOD HOLD COAG: NORMAL
WHOLE BLOOD HOLD SPECIMEN: NORMAL

## 2024-07-09 PROCEDURE — 84703 CHORIONIC GONADOTROPIN ASSAY: CPT | Performed by: STUDENT IN AN ORGANIZED HEALTH CARE EDUCATION/TRAINING PROGRAM

## 2024-07-09 PROCEDURE — 80053 COMPREHEN METABOLIC PANEL: CPT | Performed by: STUDENT IN AN ORGANIZED HEALTH CARE EDUCATION/TRAINING PROGRAM

## 2024-07-09 PROCEDURE — 72125 CT NECK SPINE W/O DYE: CPT

## 2024-07-09 PROCEDURE — 96374 THER/PROPH/DIAG INJ IV PUSH: CPT

## 2024-07-09 PROCEDURE — 25010000002 ONDANSETRON PER 1 MG: Performed by: STUDENT IN AN ORGANIZED HEALTH CARE EDUCATION/TRAINING PROGRAM

## 2024-07-09 PROCEDURE — 72125 CT NECK SPINE W/O DYE: CPT | Performed by: RADIOLOGY

## 2024-07-09 PROCEDURE — 70450 CT HEAD/BRAIN W/O DYE: CPT | Performed by: RADIOLOGY

## 2024-07-09 PROCEDURE — 70450 CT HEAD/BRAIN W/O DYE: CPT

## 2024-07-09 PROCEDURE — 85025 COMPLETE CBC W/AUTO DIFF WBC: CPT | Performed by: STUDENT IN AN ORGANIZED HEALTH CARE EDUCATION/TRAINING PROGRAM

## 2024-07-09 PROCEDURE — 99284 EMERGENCY DEPT VISIT MOD MDM: CPT

## 2024-07-09 RX ORDER — PROCHLORPERAZINE MALEATE 10 MG
10 TABLET ORAL EVERY 6 HOURS PRN
Qty: 20 TABLET | Refills: 0 | Status: SHIPPED | OUTPATIENT
Start: 2024-07-09

## 2024-07-09 RX ORDER — IBUPROFEN 800 MG/1
800 TABLET ORAL EVERY 6 HOURS PRN
Qty: 30 TABLET | Refills: 0 | Status: SHIPPED | OUTPATIENT
Start: 2024-07-09

## 2024-07-09 RX ORDER — ONDANSETRON 2 MG/ML
4 INJECTION INTRAMUSCULAR; INTRAVENOUS ONCE
Status: COMPLETED | OUTPATIENT
Start: 2024-07-09 | End: 2024-07-09

## 2024-07-09 RX ORDER — ACETAMINOPHEN 500 MG
1000 TABLET ORAL ONCE
Status: COMPLETED | OUTPATIENT
Start: 2024-07-09 | End: 2024-07-09

## 2024-07-09 RX ADMIN — ACETAMINOPHEN 1000 MG: 500 TABLET ORAL at 03:21

## 2024-07-09 RX ADMIN — ONDANSETRON 4 MG: 2 INJECTION INTRAMUSCULAR; INTRAVENOUS at 03:21

## 2024-07-09 NOTE — ED PROVIDER NOTES
Subjective     History provided by:  Patient  Head Laceration  Location:  Scalp  Quality:  Pain  Severity:  Moderate  Onset quality:  Sudden  Duration:  2 hours  Timing:  Constant  Progression:  Unchanged  Chronicity:  New  Context:  Fell down steps striking flower pot      Review of Systems   Skin:  Positive for wound.       Past Medical History:   Diagnosis Date    Anemia 1986    Anxiety 2002    COVID-19     Depression     Hepatitis C 2004    Horseshoe kidney     HPV (human papilloma virus) infection     Hypertension     Neuropathy     PID (pelvic inflammatory disease)     Scheuermann's disease        No Known Allergies    Past Surgical History:   Procedure Laterality Date     SECTION  2020     SECTION N/A 2020    Procedure:  SECTION REPEAT;  Surgeon: Sonya Barton DO;  Location: [facility] LABOR DELIVERY;  Service: Obstetrics/Gynecology;  Laterality: N/A;     SECTION  2010       Family History   Problem Relation Age of Onset    Alcohol abuse Father     Depression Father     Hypertension Father     Stroke Father     COPD Mother     Diabetes Mother     Hypertension Mother     Alcohol abuse Paternal Grandfather     Kidney disease Paternal Grandfather     Stroke Paternal Grandfather     Kidney cancer Paternal Grandmother     Coronary artery disease Paternal Grandmother     Hypertension Paternal Grandmother     Asthma Maternal Grandmother     Kidney cancer Maternal Grandmother     Depression Maternal Grandmother     Diabetes Maternal Grandmother     Hypertension Maternal Grandmother     Kidney disease Maternal Grandmother     Alcohol abuse Maternal Grandfather     Lung cancer Maternal Grandfather     Breast cancer Neg Hx        Social History     Socioeconomic History    Marital status:    Tobacco Use    Smoking status: Every Day     Current packs/day: 0.25     Types: Cigarettes    Smokeless tobacco: Never   Vaping Use    Vaping  status: Never Used   Substance and Sexual Activity    Alcohol use: No    Drug use: Not Currently     Types: Methamphetamines, Oxycodone, Amphetamines, Marijuana, Benzodiazepines     Comment: suboxone, neurontin DOES NOT TAKE ANYTING SINCE JUNE 2020    Sexual activity: Yes     Partners: Male           Objective   Physical Exam  Vitals and nursing note reviewed.   Constitutional:       General: She is not in acute distress.     Appearance: She is well-developed. She is not diaphoretic.   HENT:      Head: Normocephalic.      Comments: Soaked gauze to scalp,  gauze removed 2.5 cm laceration noted after extensive irrigation.      Right Ear: External ear normal.      Left Ear: External ear normal.      Nose: Nose normal.   Eyes:      Extraocular Movements: Extraocular movements intact.      Conjunctiva/sclera: Conjunctivae normal.      Pupils: Pupils are equal, round, and reactive to light.   Neck:      Vascular: No JVD.      Trachea: No tracheal deviation.   Cardiovascular:      Rate and Rhythm: Normal rate and regular rhythm.      Heart sounds: Normal heart sounds. No murmur heard.  Pulmonary:      Effort: Pulmonary effort is normal. No respiratory distress.      Breath sounds: Normal breath sounds. No wheezing.   Abdominal:      Palpations: Abdomen is soft.      Tenderness: There is no abdominal tenderness.   Musculoskeletal:         General: No deformity. Normal range of motion.      Cervical back: Normal range of motion and neck supple.   Skin:     General: Skin is warm and dry.      Coloration: Skin is not pale.      Findings: No erythema or rash.   Neurological:      Mental Status: She is alert and oriented to person, place, and time.      Cranial Nerves: No cranial nerve deficit.   Psychiatric:         Behavior: Behavior normal.         Thought Content: Thought content normal.         Laceration Repair    Date/Time: 7/9/2024 5:37 AM    Performed by: Dick Monk II, PA  Authorized by: Nori Mccoy DO     Consent:     Consent obtained:  Verbal    Consent given by:  Patient    Risks discussed:  Pain  Anesthesia:     Anesthesia method:  None  Laceration details:     Location:  Scalp  Exploration:     Hemostasis achieved with:  Direct pressure  Treatment:     Area cleansed with:  Shur-Clens and soap and water    Amount of cleaning:  Extensive    Irrigation solution:  Sterile saline    Irrigation method:  Pressure wash    Visualized foreign bodies/material removed: no      Debridement:  None  Skin repair:     Repair method:  Staples    Number of staples:  1  Repair type:     Repair type:  Simple  Post-procedure details:     Dressing:  Open (no dressing)    Procedure completion:  Tolerated well, no immediate complications             ED Course  ED Course as of 07/09/24 0539   Tue Jul 09, 2024   0524 CT head rad interpreted:  1. No acute intracranial process. [RB]   0525 CT cervical spine rad interpreted:  1. No acute fracture or traumatic listhesis. [RB]      ED Course User Index  [RB] Dick Monk II, PA                                             Medical Decision Making  Amount and/or Complexity of Data Reviewed  Labs: ordered.  Radiology: ordered.    Risk  OTC drugs.  Prescription drug management.        Final diagnoses:   Laceration of scalp, initial encounter       ED Disposition  ED Disposition       ED Disposition   Discharge    Condition   Stable    Comment   --               Keily Galindo, APRN  [address] [address]  [phone]    Schedule an appointment as soon as possible for a visit            Medication List        New Prescriptions      ibuprofen 800 MG tablet  Commonly known as: ADVIL,MOTRIN  Take 1 tablet by mouth Every 6 (Six) Hours As Needed for Moderate Pain.     prochlorperazine 10 MG tablet  Commonly known as: COMPAZINE  Take 1 tablet by mouth Every 6 (Six) Hours As Needed for Nausea (headache).               Where to Get Your Medications        These medications were sent  to [facility] - [place], [place] - [address] - [phone]  - [phone]   [address], [facility] [address]      Phone: [phone]   ibuprofen 800 MG tablet  prochlorperazine 10 MG tablet            Dick Monk II, PA  07/09/24 0526

## 2024-07-09 NOTE — ED NOTES
MEDICAL SCREENING:    Reason for Visit: head injury    Patient initially seen in triage.  The patient was advised further evaluation and diagnostic testing will be needed, some of the treatment and testing will be initiated in the lobby in order to begin the process.  The patient will be returned to the waiting area for the time being and possibly be re-assessed by a subsequent ED provider.  The patient will be brought back to the treatment area in as timely manner as possible.       Dick Monk II, PA  07/09/24 0255

## 2025-08-25 ENCOUNTER — TRANSCRIBE ORDERS (OUTPATIENT)
Dept: ADMINISTRATIVE | Facility: HOSPITAL | Age: 40
End: 2025-08-25
Payer: MEDICAID

## 2025-08-25 DIAGNOSIS — M79.601 RIGHT ARM PAIN: Primary | ICD-10-CM

## (undated) DEVICE — SKIN AFFIX SURG ADHESIVE 72/CS 0.55ML: Brand: MEDLINE

## (undated) DEVICE — CUFF SCD HEMOFORCE SEQ CALF STD MD

## (undated) DEVICE — HYDROGEL COATED LATEX URINE METER FOLEY TRAY,16 FR/CH (5.3 MM), 5 ML CATHETER PRE-CONNECTED TO 2000 ML DRAINAGE BAG WITH NEEDLE SAMPLING: Brand: DOVER

## (undated) DEVICE — PK C/SECT 40

## (undated) DEVICE — TRY SPINE PENCAN 24GA X4IN

## (undated) DEVICE — APPL CHLORAPREP W/TINT 26ML ORNG

## (undated) DEVICE — BG RESUSCITATOR INFANT BABYBLUE2

## (undated) DEVICE — SLV SCD CALF HEMOFORCE DVT THERP REPR LG

## (undated) DEVICE — CONMED GOLDLINE ELECTROSURGICAL HANDPIECE, HAND CONTROLLED WITH ULTRACLEAN BLADE ELECTRODE, BUTTON SWITCH, SAFETY HOLSTER AND 10' (3 M) CABLE: Brand: CONMED GOLDLINE